# Patient Record
Sex: FEMALE | Race: WHITE | Employment: FULL TIME | URBAN - METROPOLITAN AREA
[De-identification: names, ages, dates, MRNs, and addresses within clinical notes are randomized per-mention and may not be internally consistent; named-entity substitution may affect disease eponyms.]

---

## 2017-01-15 PROBLEM — M79.606 PAIN OF LOWER EXTREMITY: Status: ACTIVE | Noted: 2017-01-15

## 2017-01-15 PROBLEM — R05.9 COUGH: Status: ACTIVE | Noted: 2017-01-15

## 2017-01-15 PROBLEM — J01.90 ACUTE NON-RECURRENT SINUSITIS: Status: ACTIVE | Noted: 2017-01-15

## 2017-01-15 PROBLEM — R53.83 FATIGUE: Status: ACTIVE | Noted: 2017-01-15

## 2017-01-15 PROBLEM — R09.81 NASAL CONGESTION: Status: ACTIVE | Noted: 2017-01-15

## 2018-03-11 ENCOUNTER — HOSPITAL ENCOUNTER (OUTPATIENT)
Dept: GENERAL RADIOLOGY | Age: 63
Discharge: HOME | End: 2018-03-13
Payer: COMMERCIAL

## 2018-03-11 ENCOUNTER — HOSPITAL ENCOUNTER (OUTPATIENT)
Age: 63
Discharge: HOME | End: 2018-03-13
Payer: COMMERCIAL

## 2018-03-11 DIAGNOSIS — M25.561 ACUTE PAIN OF RIGHT KNEE: ICD-10-CM

## 2018-03-11 PROCEDURE — 73560 X-RAY EXAM OF KNEE 1 OR 2: CPT

## 2018-03-15 ENCOUNTER — APPOINTMENT (OUTPATIENT)
Dept: GENERAL RADIOLOGY | Age: 63
End: 2018-03-15
Payer: COMMERCIAL

## 2018-03-15 ENCOUNTER — HOSPITAL ENCOUNTER (EMERGENCY)
Age: 63
Discharge: HOME | End: 2018-03-15
Payer: COMMERCIAL

## 2018-03-15 VITALS
TEMPERATURE: 98.6 F | OXYGEN SATURATION: 98 % | HEART RATE: 70 BPM | WEIGHT: 270 LBS | RESPIRATION RATE: 14 BRPM | HEIGHT: 68 IN | SYSTOLIC BLOOD PRESSURE: 120 MMHG | DIASTOLIC BLOOD PRESSURE: 78 MMHG | BODY MASS INDEX: 40.92 KG/M2

## 2018-03-15 DIAGNOSIS — S83.91XA SPRAIN OF RIGHT KNEE, UNSPECIFIED LIGAMENT, INITIAL ENCOUNTER: Primary | ICD-10-CM

## 2018-03-15 PROCEDURE — 73564 X-RAY EXAM KNEE 4 OR MORE: CPT

## 2018-03-15 PROCEDURE — 99283 EMERGENCY DEPT VISIT LOW MDM: CPT

## 2018-03-15 RX ORDER — IBUPROFEN 800 MG/1
800 TABLET ORAL EVERY 6 HOURS PRN
Qty: 16 TABLET | Refills: 0 | Status: SHIPPED | OUTPATIENT
Start: 2018-03-15 | End: 2018-09-21

## 2018-03-15 ASSESSMENT — PAIN SCALES - GENERAL
PAINLEVEL_OUTOF10: 5
PAINLEVEL_OUTOF10: 2

## 2018-03-15 ASSESSMENT — PAIN DESCRIPTION - PAIN TYPE
TYPE: ACUTE PAIN
TYPE: ACUTE PAIN

## 2018-03-15 ASSESSMENT — PAIN DESCRIPTION - ORIENTATION
ORIENTATION: RIGHT
ORIENTATION: RIGHT

## 2018-03-15 ASSESSMENT — PAIN DESCRIPTION - DESCRIPTORS
DESCRIPTORS: CONSTANT;ACHING
DESCRIPTORS: ACHING

## 2018-03-15 ASSESSMENT — PAIN DESCRIPTION - LOCATION
LOCATION: KNEE
LOCATION: KNEE

## 2018-03-21 DIAGNOSIS — I10 ESSENTIAL HYPERTENSION: ICD-10-CM

## 2018-03-21 DIAGNOSIS — I48.20 CHRONIC ATRIAL FIBRILLATION (HCC): ICD-10-CM

## 2018-03-21 RX ORDER — TORSEMIDE 10 MG/1
TABLET ORAL
Qty: 90 TABLET | Refills: 0 | Status: SHIPPED | OUTPATIENT
Start: 2018-03-21 | End: 2018-10-04 | Stop reason: SDUPTHER

## 2018-03-21 RX ORDER — WARFARIN SODIUM 2.5 MG/1
TABLET ORAL
Qty: 100 TABLET | Refills: 0 | Status: SHIPPED | OUTPATIENT
Start: 2018-03-21 | End: 2018-09-21

## 2018-05-31 ENCOUNTER — HOSPITAL ENCOUNTER (OUTPATIENT)
Age: 63
Discharge: HOME | End: 2018-05-31
Payer: COMMERCIAL

## 2018-05-31 LAB
INR BLD: 2.1
PROTHROMBIN TIME: 24.1 SEC (ref 9.3–12.4)

## 2018-05-31 PROCEDURE — 36415 COLL VENOUS BLD VENIPUNCTURE: CPT

## 2018-05-31 PROCEDURE — 85610 PROTHROMBIN TIME: CPT

## 2018-06-01 ENCOUNTER — ANTI-COAG VISIT (OUTPATIENT)
Dept: FAMILY MEDICINE CLINIC | Age: 63
End: 2018-06-01

## 2018-06-01 DIAGNOSIS — I48.20 CHRONIC ATRIAL FIBRILLATION (HCC): ICD-10-CM

## 2018-06-05 DIAGNOSIS — I48.20 CHRONIC ATRIAL FIBRILLATION (HCC): ICD-10-CM

## 2018-06-05 RX ORDER — WARFARIN SODIUM 2.5 MG/1
TABLET ORAL
Qty: 100 TABLET | Refills: 0 | Status: SHIPPED | OUTPATIENT
Start: 2018-06-05 | End: 2018-08-13 | Stop reason: SDUPTHER

## 2018-06-05 RX ORDER — WARFARIN SODIUM 2.5 MG/1
TABLET ORAL
Qty: 100 TABLET | Refills: 0 | Status: CANCELLED | OUTPATIENT
Start: 2018-06-05

## 2018-07-19 ENCOUNTER — TELEPHONE (OUTPATIENT)
Dept: FAMILY MEDICINE CLINIC | Age: 63
End: 2018-07-19

## 2018-07-19 NOTE — TELEPHONE ENCOUNTER
MA attempted to contact pt regarding OC Fit Test. Pt did not answer. MA left message for pt inquiring if she still had the FIT test to complete, and if she was still interested in returning it to the office for completion. MA advised pt that if she no longer had the FIT test and was interested in doing it, she may contact the office for another test kit. MA advised pt that if she did have test and wished to complete it, she may return it to the office at any time Monday-Friday 8:00AM-4:00PM or mail it to the office. MA requested return phone call for any questions or concerns.     Electronically signed by Samantha Parra MA on 7/19/18 at 10:23 AM

## 2018-08-12 ENCOUNTER — HOSPITAL ENCOUNTER (OUTPATIENT)
Age: 63
Discharge: HOME | End: 2018-08-12
Payer: COMMERCIAL

## 2018-08-12 LAB
INR BLD: 2.2
PROTHROMBIN TIME: 24.7 SEC (ref 9.3–12.4)

## 2018-08-12 PROCEDURE — 36415 COLL VENOUS BLD VENIPUNCTURE: CPT

## 2018-08-12 PROCEDURE — 85610 PROTHROMBIN TIME: CPT

## 2018-08-13 ENCOUNTER — ANTI-COAG VISIT (OUTPATIENT)
Dept: FAMILY MEDICINE CLINIC | Age: 63
End: 2018-08-13

## 2018-08-13 DIAGNOSIS — I48.20 CHRONIC ATRIAL FIBRILLATION (HCC): ICD-10-CM

## 2018-08-13 DIAGNOSIS — I10 ESSENTIAL HYPERTENSION: ICD-10-CM

## 2018-08-13 RX ORDER — WARFARIN SODIUM 2.5 MG/1
TABLET ORAL
Qty: 60 TABLET | Refills: 0 | Status: SHIPPED | OUTPATIENT
Start: 2018-08-13 | End: 2018-09-21 | Stop reason: SDUPTHER

## 2018-08-13 RX ORDER — SOTALOL HYDROCHLORIDE 120 MG/1
120 TABLET ORAL 2 TIMES DAILY
Qty: 60 TABLET | Refills: 0 | Status: SHIPPED | OUTPATIENT
Start: 2018-08-13 | End: 2018-09-10 | Stop reason: SDUPTHER

## 2018-09-21 ENCOUNTER — OFFICE VISIT (OUTPATIENT)
Dept: FAMILY MEDICINE CLINIC | Age: 63
End: 2018-09-21
Payer: COMMERCIAL

## 2018-09-21 ENCOUNTER — ANTI-COAG VISIT (OUTPATIENT)
Dept: FAMILY MEDICINE CLINIC | Age: 63
End: 2018-09-21

## 2018-09-21 VITALS
SYSTOLIC BLOOD PRESSURE: 134 MMHG | HEART RATE: 74 BPM | WEIGHT: 276 LBS | DIASTOLIC BLOOD PRESSURE: 82 MMHG | BODY MASS INDEX: 40.88 KG/M2 | OXYGEN SATURATION: 98 % | HEIGHT: 69 IN

## 2018-09-21 DIAGNOSIS — I48.20 CHRONIC ATRIAL FIBRILLATION (HCC): Primary | ICD-10-CM

## 2018-09-21 DIAGNOSIS — R73.01 IFG (IMPAIRED FASTING GLUCOSE): ICD-10-CM

## 2018-09-21 DIAGNOSIS — E78.2 MIXED HYPERLIPIDEMIA: ICD-10-CM

## 2018-09-21 DIAGNOSIS — I48.20 CHRONIC ATRIAL FIBRILLATION (HCC): ICD-10-CM

## 2018-09-21 DIAGNOSIS — Z12.39 SCREENING FOR BREAST CANCER: ICD-10-CM

## 2018-09-21 DIAGNOSIS — R53.83 OTHER FATIGUE: ICD-10-CM

## 2018-09-21 DIAGNOSIS — I10 ESSENTIAL HYPERTENSION: ICD-10-CM

## 2018-09-21 DIAGNOSIS — I83.93 VARICOSE VEINS OF BOTH LOWER EXTREMITIES: ICD-10-CM

## 2018-09-21 DIAGNOSIS — M19.90 OSTEOARTHRITIS, UNSPECIFIED OSTEOARTHRITIS TYPE, UNSPECIFIED SITE: ICD-10-CM

## 2018-09-21 LAB
INTERNATIONAL NORMALIZATION RATIO, POC: 1.9
PROTHROMBIN TIME, POC: NORMAL

## 2018-09-21 PROCEDURE — G8427 DOCREV CUR MEDS BY ELIG CLIN: HCPCS | Performed by: FAMILY MEDICINE

## 2018-09-21 PROCEDURE — G8417 CALC BMI ABV UP PARAM F/U: HCPCS | Performed by: FAMILY MEDICINE

## 2018-09-21 PROCEDURE — G8598 ASA/ANTIPLAT THER USED: HCPCS | Performed by: FAMILY MEDICINE

## 2018-09-21 PROCEDURE — 85610 PROTHROMBIN TIME: CPT | Performed by: FAMILY MEDICINE

## 2018-09-21 PROCEDURE — 3017F COLORECTAL CA SCREEN DOC REV: CPT | Performed by: FAMILY MEDICINE

## 2018-09-21 PROCEDURE — 1036F TOBACCO NON-USER: CPT | Performed by: FAMILY MEDICINE

## 2018-09-21 PROCEDURE — 99213 OFFICE O/P EST LOW 20 MIN: CPT | Performed by: FAMILY MEDICINE

## 2018-09-21 RX ORDER — CYANOCOBALAMIN 1000 UG/ML
1000 INJECTION INTRAMUSCULAR; SUBCUTANEOUS ONCE
Status: CANCELLED | OUTPATIENT
Start: 2018-09-21 | End: 2018-09-21

## 2018-09-21 RX ORDER — SOTALOL HYDROCHLORIDE 120 MG/1
TABLET ORAL
Qty: 60 TABLET | Refills: 5 | Status: SHIPPED | OUTPATIENT
Start: 2018-09-21 | End: 2019-04-12 | Stop reason: SDUPTHER

## 2018-09-21 RX ORDER — WARFARIN SODIUM 2.5 MG/1
TABLET ORAL
Qty: 60 TABLET | Refills: 5 | Status: SHIPPED | OUTPATIENT
Start: 2018-09-21 | End: 2019-07-02 | Stop reason: SDUPTHER

## 2018-09-21 RX ORDER — BACLOFEN 10 MG/1
10 TABLET ORAL EVERY EVENING
Qty: 30 TABLET | Refills: 1 | Status: SHIPPED | OUTPATIENT
Start: 2018-09-21 | End: 2019-08-03 | Stop reason: SDUPTHER

## 2018-09-21 NOTE — PROGRESS NOTES
MA spoke with pt regarding coumadin dose and recheck date. Pt verbalized she understood to continue on same dose, and requested for a standing order to go to the [facility]) lab to have venous INR's done. Order placed.     Electronically signed by Theo Barajas MA on 9/21/18 at 1:39 PM

## 2018-09-25 PROBLEM — O22.00 VARICOSE VEINS DURING PREGNANCY, ANTEPARTUM: Status: RESOLVED | Noted: 2018-09-25 | Resolved: 2018-09-25

## 2018-09-25 PROBLEM — O22.00 VARICOSE VEINS DURING PREGNANCY, ANTEPARTUM: Status: ACTIVE | Noted: 2018-09-25

## 2018-09-25 PROBLEM — I83.93 VARICOSE VEINS OF BOTH LOWER EXTREMITIES: Status: ACTIVE | Noted: 2018-09-25

## 2018-09-25 ASSESSMENT — ENCOUNTER SYMPTOMS
EYES NEGATIVE: 1
STRIDOR: 0
ABDOMINAL PAIN: 0
COUGH: 0
EYE REDNESS: 0
VOMITING: 0
SHORTNESS OF BREATH: 0
SPUTUM PRODUCTION: 0
SORE THROAT: 0
BACK PAIN: 0
GASTROINTESTINAL NEGATIVE: 1
NAUSEA: 0
EYE PAIN: 0
HEMOPTYSIS: 0
CONSTIPATION: 0
WHEEZING: 0
BLOOD IN STOOL: 0
BLURRED VISION: 0
PHOTOPHOBIA: 0
DOUBLE VISION: 0
EYE DISCHARGE: 0
DIARRHEA: 0
SINUS PAIN: 0
ORTHOPNEA: 0
HEARTBURN: 0
RESPIRATORY NEGATIVE: 1

## 2018-09-26 NOTE — PROGRESS NOTES
nervous/anxious and does not have insomnia. Past Medical/Surgical Hx;  Reviewed with patient      Diagnosis Date    Atrial fibrillation ([address]     Coronary heart disease 2013     Past Surgical History:   Procedure Laterality Date     SECTION      DILATION AND CURETTAGE OF UTERUS      ECHO COMPL W DOP COLOR FLOW  2013         TONSILLECTOMY         Past Family Hx:  Reviewed with patient  History reviewed. No pertinent family history. Social Hx:  Reviewed with patient  Social History   Substance Use Topics    Smoking status: Former Smoker    Smokeless tobacco: Never Used    Alcohol use No       OBJECTIVE  /82   Pulse 74   Ht 5' 8.5\" (1.74 m)   Wt 276 lb (125.2 kg)   SpO2 98%   Breastfeeding? No   BMI 41.36 kg/m²     Problem List:  Sherman Mcneill  does not have any pertinent problems on file. PHYS EX:  Physical Exam   Constitutional: She is oriented to person, place, and time. She appears well-developed and well-nourished. No distress. HENT:   Head: Normocephalic and atraumatic. Right Ear: External ear normal.   Left Ear: External ear normal.   Nose: Nose normal.   Mouth/Throat: Oropharynx is clear and moist. No oropharyngeal exudate. Eyes: Pupils are equal, round, and reactive to light. Conjunctivae and EOM are normal. Right eye exhibits no discharge. Left eye exhibits no discharge. No scleral icterus. Neck: Normal range of motion. Neck supple. No JVD present. No tracheal deviation present. No thyromegaly present. Cardiovascular: Normal rate, normal heart sounds and intact distal pulses. Exam reveals no gallop and no friction rub. No murmur heard. Pulmonary/Chest: Effort normal and breath sounds normal. No stridor. No respiratory distress. She has no wheezes. She has no rales. She exhibits no tenderness. Abdominal: Soft. Bowel sounds are normal. She exhibits no distension and no mass. There is no tenderness. There is no rebound and no guarding. No hernia. Musculoskeletal: Normal range of motion. She exhibits no edema, tenderness or deformity. Lymphadenopathy:     She has no cervical adenopathy. Neurological: She is alert and oriented to person, place, and time. She has normal reflexes. She displays normal reflexes. No cranial nerve deficit or sensory deficit. She exhibits normal muscle tone. Coordination normal.   Skin: Skin is warm. No rash noted. She is not diaphoretic. There is erythema. No pallor. Pt has varicose maggie. Lower extremities. Psychiatric: She has a normal mood and affect. Her behavior is normal. Judgment and thought content normal.   Nursing note and vitals reviewed. ASSESSMENT/PLAN  Aixa Hurtado was seen today for atrial fibrillation and anticoagulation. Diagnoses and all orders for this visit:    Chronic atrial fibrillation (HCC)  -     POCT INR  -     warfarin (COUMADIN) 2.5 MG tablet; Take one tablet daily or as directed  -     CBC Auto Differential; Future  -     Comprehensive Metabolic Panel; Future  Controlled. Lab, INR, coumadin, betapace. Essential hypertension  -     sotalol (BETAPACE) 120 MG tablet; TAKE 1 TABLET BY MOUTH TWICE DAILY  -     CBC Auto Differential; Future  -     Comprehensive Metabolic Panel; Future  Controlled. Lab, betapace, demadex. Other fatigue  -     CBC Auto Differential; Future  -     Comprehensive Metabolic Panel; Future  -     TSH without Reflex; Future  -     Vitamin B12 & Folate; Future  Not controlled. Lab. IFG (impaired fasting glucose)  -     CBC Auto Differential; Future  -     Comprehensive Metabolic Panel; Future  -     Hemoglobin A1C; Future  -     Lipid Panel; Future  Instructed on lab. Mixed hyperlipidemia  -     CBC Auto Differential; Future  -     Comprehensive Metabolic Panel; Future  -     Lipid Panel; Future  Not controlled. Lab, low chol. Diet. Screening for breast cancer  -     PURA DIGITAL SCREEN W OR WO CAD BILATERAL;  Future  -     CBC Auto Differential; Future  -     Comprehensive cancer screen     A1C test (Diabetic or Prediabetic)     Potassium monitoring     Creatinine monitoring     Flu vaccine (1)    Lipid screen     Colon cancer screen colonoscopy

## 2018-09-27 PROBLEM — R05.9 COUGH: Status: RESOLVED | Noted: 2017-01-15 | Resolved: 2018-09-27

## 2018-10-04 DIAGNOSIS — I10 ESSENTIAL HYPERTENSION: ICD-10-CM

## 2018-10-04 RX ORDER — TORSEMIDE 10 MG/1
TABLET ORAL
Qty: 90 TABLET | Refills: 0 | Status: SHIPPED | OUTPATIENT
Start: 2018-10-04 | End: 2019-04-13 | Stop reason: SDUPTHER

## 2018-10-04 NOTE — TELEPHONE ENCOUNTER
From: Brett Sotelo  Sent: 10/4/2018 11:54 AM EDT  Subject: Medication Renewal Request    Clayton Taylor.  Sakacs would like a refill of the following medications:     torsemide (DEMADEX) 10 MG tablet NATALIE Mccoy    Preferred pharmacy: [facility] [address], [address] - F [phone]

## 2018-10-24 ENCOUNTER — TELEPHONE (OUTPATIENT)
Dept: VASCULAR SURGERY | Age: 63
End: 2018-10-24

## 2018-10-25 ENCOUNTER — HOSPITAL ENCOUNTER (OUTPATIENT)
Dept: MAMMOGRAPHY | Age: 63
Discharge: HOME | End: 2018-10-27
Payer: COMMERCIAL

## 2018-10-25 DIAGNOSIS — Z12.39 SCREENING FOR BREAST CANCER: ICD-10-CM

## 2018-12-17 DIAGNOSIS — I48.20 CHRONIC ATRIAL FIBRILLATION (HCC): Primary | ICD-10-CM

## 2019-02-10 ENCOUNTER — HOSPITAL ENCOUNTER (OUTPATIENT)
Age: 64
Discharge: HOME | End: 2019-02-10
Payer: COMMERCIAL

## 2019-02-10 DIAGNOSIS — R53.83 OTHER FATIGUE: ICD-10-CM

## 2019-02-10 DIAGNOSIS — R73.01 IFG (IMPAIRED FASTING GLUCOSE): ICD-10-CM

## 2019-02-10 DIAGNOSIS — M19.90 OSTEOARTHRITIS, UNSPECIFIED OSTEOARTHRITIS TYPE, UNSPECIFIED SITE: ICD-10-CM

## 2019-02-10 DIAGNOSIS — I10 ESSENTIAL HYPERTENSION: ICD-10-CM

## 2019-02-10 DIAGNOSIS — I48.20 CHRONIC ATRIAL FIBRILLATION (HCC): ICD-10-CM

## 2019-02-10 DIAGNOSIS — E78.2 MIXED HYPERLIPIDEMIA: ICD-10-CM

## 2019-02-10 DIAGNOSIS — Z12.39 SCREENING FOR BREAST CANCER: ICD-10-CM

## 2019-02-10 LAB
ALBUMIN SERPL-MCNC: 4 G/DL (ref 3.5–5.2)
ALP BLD-CCNC: 60 U/L (ref 35–104)
ALT SERPL-CCNC: 15 U/L (ref 0–32)
ANION GAP SERPL CALCULATED.3IONS-SCNC: 10 MMOL/L (ref 7–16)
AST SERPL-CCNC: 15 U/L (ref 0–31)
BASOPHILS ABSOLUTE: 0.06 E9/L (ref 0–0.2)
BASOPHILS RELATIVE PERCENT: 1 % (ref 0–2)
BILIRUB SERPL-MCNC: 0.4 MG/DL (ref 0–1.2)
BUN BLDV-MCNC: 13 MG/DL (ref 8–23)
CALCIUM SERPL-MCNC: 9.6 MG/DL (ref 8.6–10.2)
CHLORIDE BLD-SCNC: 107 MMOL/L (ref 98–107)
CHOLESTEROL, TOTAL: 164 MG/DL (ref 0–199)
CO2: 27 MMOL/L (ref 22–29)
CREAT SERPL-MCNC: 0.8 MG/DL (ref 0.5–1)
EOSINOPHILS ABSOLUTE: 0.23 E9/L (ref 0.05–0.5)
EOSINOPHILS RELATIVE PERCENT: 3.7 % (ref 0–6)
FOLATE: 7.4 NG/ML (ref 4.8–24.2)
GFR AFRICAN AMERICAN: >60
GFR NON-AFRICAN AMERICAN: >60 ML/MIN/1.73
GLUCOSE BLD-MCNC: 119 MG/DL (ref 74–99)
HBA1C MFR BLD: 5.6 % (ref 4–5.6)
HCT VFR BLD CALC: 41.1 % (ref 34–48)
HDLC SERPL-MCNC: 38 MG/DL
HEMOGLOBIN: 12.8 G/DL (ref 11.5–15.5)
IMMATURE GRANULOCYTES #: 0.02 E9/L
IMMATURE GRANULOCYTES %: 0.3 % (ref 0–5)
INR BLD: 2.2
LDL CHOLESTEROL CALCULATED: 101 MG/DL (ref 0–99)
LYMPHOCYTES ABSOLUTE: 1.55 E9/L (ref 1.5–4)
LYMPHOCYTES RELATIVE PERCENT: 24.7 % (ref 20–42)
MCH RBC QN AUTO: 27.8 PG (ref 26–35)
MCHC RBC AUTO-ENTMCNC: 31.1 % (ref 32–34.5)
MCV RBC AUTO: 89.3 FL (ref 80–99.9)
MONOCYTES ABSOLUTE: 0.41 E9/L (ref 0.1–0.95)
MONOCYTES RELATIVE PERCENT: 6.5 % (ref 2–12)
NEUTROPHILS ABSOLUTE: 4 E9/L (ref 1.8–7.3)
NEUTROPHILS RELATIVE PERCENT: 63.8 % (ref 43–80)
PDW BLD-RTO: 13.4 FL (ref 11.5–15)
PLATELET # BLD: 246 E9/L (ref 130–450)
PMV BLD AUTO: 10.7 FL (ref 7–12)
POTASSIUM SERPL-SCNC: 4.1 MMOL/L (ref 3.5–5)
PROTHROMBIN TIME: 24.6 SEC (ref 9.3–12.4)
RBC # BLD: 4.6 E12/L (ref 3.5–5.5)
SODIUM BLD-SCNC: 144 MMOL/L (ref 132–146)
TOTAL PROTEIN: 7.4 G/DL (ref 6.4–8.3)
TRIGL SERPL-MCNC: 125 MG/DL (ref 0–149)
TSH SERPL DL<=0.05 MIU/L-ACNC: 2.94 UIU/ML (ref 0.27–4.2)
VITAMIN B-12: 290 PG/ML (ref 211–946)
VLDLC SERPL CALC-MCNC: 25 MG/DL
WBC # BLD: 6.3 E9/L (ref 4.5–11.5)

## 2019-02-10 PROCEDURE — 84443 ASSAY THYROID STIM HORMONE: CPT

## 2019-02-10 PROCEDURE — 85610 PROTHROMBIN TIME: CPT

## 2019-02-10 PROCEDURE — 80061 LIPID PANEL: CPT

## 2019-02-10 PROCEDURE — 80053 COMPREHEN METABOLIC PANEL: CPT

## 2019-02-10 PROCEDURE — 82607 VITAMIN B-12: CPT

## 2019-02-10 PROCEDURE — 82746 ASSAY OF FOLIC ACID SERUM: CPT

## 2019-02-10 PROCEDURE — 83036 HEMOGLOBIN GLYCOSYLATED A1C: CPT

## 2019-02-10 PROCEDURE — 85025 COMPLETE CBC W/AUTO DIFF WBC: CPT

## 2019-02-10 PROCEDURE — 36415 COLL VENOUS BLD VENIPUNCTURE: CPT

## 2019-02-11 ENCOUNTER — ANTI-COAG VISIT (OUTPATIENT)
Dept: FAMILY MEDICINE CLINIC | Age: 64
End: 2019-02-11

## 2019-02-11 DIAGNOSIS — I48.20 CHRONIC ATRIAL FIBRILLATION (HCC): ICD-10-CM

## 2019-02-22 ENCOUNTER — OFFICE VISIT (OUTPATIENT)
Dept: FAMILY MEDICINE CLINIC | Age: 64
End: 2019-02-22
Payer: COMMERCIAL

## 2019-02-22 VITALS
DIASTOLIC BLOOD PRESSURE: 78 MMHG | WEIGHT: 272 LBS | TEMPERATURE: 97.3 F | HEART RATE: 95 BPM | BODY MASS INDEX: 40.29 KG/M2 | OXYGEN SATURATION: 98 % | RESPIRATION RATE: 18 BRPM | SYSTOLIC BLOOD PRESSURE: 120 MMHG | HEIGHT: 69 IN

## 2019-02-22 DIAGNOSIS — K02.9 INFECTED DENTAL CARRIES: ICD-10-CM

## 2019-02-22 DIAGNOSIS — K04.7 INFECTED DENTAL CARRIES: ICD-10-CM

## 2019-02-22 DIAGNOSIS — I83.93 VARICOSE VEINS OF BOTH LOWER EXTREMITIES, UNSPECIFIED WHETHER COMPLICATED: ICD-10-CM

## 2019-02-22 DIAGNOSIS — I10 ESSENTIAL HYPERTENSION: ICD-10-CM

## 2019-02-22 DIAGNOSIS — Z01.818 PRE-OP EXAM: ICD-10-CM

## 2019-02-22 DIAGNOSIS — Z12.39 SCREENING FOR BREAST CANCER: ICD-10-CM

## 2019-02-22 DIAGNOSIS — I48.20 CHRONIC ATRIAL FIBRILLATION (HCC): Primary | ICD-10-CM

## 2019-02-22 PROCEDURE — G8484 FLU IMMUNIZE NO ADMIN: HCPCS | Performed by: FAMILY MEDICINE

## 2019-02-22 PROCEDURE — 93000 ELECTROCARDIOGRAM COMPLETE: CPT | Performed by: FAMILY MEDICINE

## 2019-02-22 PROCEDURE — 3017F COLORECTAL CA SCREEN DOC REV: CPT | Performed by: FAMILY MEDICINE

## 2019-02-22 PROCEDURE — 1036F TOBACCO NON-USER: CPT | Performed by: FAMILY MEDICINE

## 2019-02-22 PROCEDURE — 99214 OFFICE O/P EST MOD 30 MIN: CPT | Performed by: FAMILY MEDICINE

## 2019-02-22 PROCEDURE — G8598 ASA/ANTIPLAT THER USED: HCPCS | Performed by: FAMILY MEDICINE

## 2019-02-22 PROCEDURE — G8417 CALC BMI ABV UP PARAM F/U: HCPCS | Performed by: FAMILY MEDICINE

## 2019-02-22 PROCEDURE — G8427 DOCREV CUR MEDS BY ELIG CLIN: HCPCS | Performed by: FAMILY MEDICINE

## 2019-02-22 ASSESSMENT — PATIENT HEALTH QUESTIONNAIRE - PHQ9
SUM OF ALL RESPONSES TO PHQ QUESTIONS 1-9: 0
2. FEELING DOWN, DEPRESSED OR HOPELESS: 0
SUM OF ALL RESPONSES TO PHQ QUESTIONS 1-9: 0
SUM OF ALL RESPONSES TO PHQ9 QUESTIONS 1 & 2: 0
1. LITTLE INTEREST OR PLEASURE IN DOING THINGS: 0

## 2019-02-25 ASSESSMENT — ENCOUNTER SYMPTOMS
CHOKING: 0
COUGH: 0
VOICE CHANGE: 0
EYE PAIN: 0
STRIDOR: 0
EYE REDNESS: 0
BACK PAIN: 0
RECTAL PAIN: 0
BLOOD IN STOOL: 0
VOMITING: 0
SORE THROAT: 0
FACIAL SWELLING: 0
WHEEZING: 0
ANAL BLEEDING: 0
SINUS PRESSURE: 0
DIARRHEA: 0
EYE ITCHING: 0
ABDOMINAL DISTENTION: 0
TROUBLE SWALLOWING: 0
CHEST TIGHTNESS: 0
NAUSEA: 0
SINUS PAIN: 0
RESPIRATORY NEGATIVE: 1
RHINORRHEA: 0
COLOR CHANGE: 0
EYE DISCHARGE: 0
PHOTOPHOBIA: 0
ALLERGIC/IMMUNOLOGIC NEGATIVE: 1
SHORTNESS OF BREATH: 0
EYES NEGATIVE: 1
CONSTIPATION: 0
ABDOMINAL PAIN: 0

## 2019-04-03 ENCOUNTER — ANTI-COAG VISIT (OUTPATIENT)
Dept: FAMILY MEDICINE CLINIC | Age: 64
End: 2019-04-03

## 2019-04-03 ENCOUNTER — TELEPHONE (OUTPATIENT)
Dept: FAMILY MEDICINE CLINIC | Age: 64
End: 2019-04-03

## 2019-04-03 ENCOUNTER — OFFICE VISIT (OUTPATIENT)
Dept: FAMILY MEDICINE CLINIC | Age: 64
End: 2019-04-03
Payer: COMMERCIAL

## 2019-04-03 VITALS
DIASTOLIC BLOOD PRESSURE: 80 MMHG | WEIGHT: 274 LBS | HEART RATE: 90 BPM | SYSTOLIC BLOOD PRESSURE: 100 MMHG | OXYGEN SATURATION: 94 % | BODY MASS INDEX: 40.58 KG/M2 | HEIGHT: 69 IN

## 2019-04-03 DIAGNOSIS — I48.20 CHRONIC ATRIAL FIBRILLATION (HCC): ICD-10-CM

## 2019-04-03 DIAGNOSIS — E78.2 MIXED HYPERLIPIDEMIA: ICD-10-CM

## 2019-04-03 DIAGNOSIS — I48.20 CHRONIC ATRIAL FIBRILLATION (HCC): Primary | ICD-10-CM

## 2019-04-03 DIAGNOSIS — R73.01 IFG (IMPAIRED FASTING GLUCOSE): ICD-10-CM

## 2019-04-03 DIAGNOSIS — M67.911 ROTATOR CUFF DISORDER, RIGHT: ICD-10-CM

## 2019-04-03 LAB
INTERNATIONAL NORMALIZATION RATIO, POC: 1.6
PROTHROMBIN TIME, POC: 18.8

## 2019-04-03 PROCEDURE — G8417 CALC BMI ABV UP PARAM F/U: HCPCS | Performed by: FAMILY MEDICINE

## 2019-04-03 PROCEDURE — 3017F COLORECTAL CA SCREEN DOC REV: CPT | Performed by: FAMILY MEDICINE

## 2019-04-03 PROCEDURE — 85610 PROTHROMBIN TIME: CPT | Performed by: FAMILY MEDICINE

## 2019-04-03 PROCEDURE — 99213 OFFICE O/P EST LOW 20 MIN: CPT | Performed by: FAMILY MEDICINE

## 2019-04-03 PROCEDURE — G8427 DOCREV CUR MEDS BY ELIG CLIN: HCPCS | Performed by: FAMILY MEDICINE

## 2019-04-03 PROCEDURE — G8598 ASA/ANTIPLAT THER USED: HCPCS | Performed by: FAMILY MEDICINE

## 2019-04-03 PROCEDURE — 1036F TOBACCO NON-USER: CPT | Performed by: FAMILY MEDICINE

## 2019-04-03 ASSESSMENT — ENCOUNTER SYMPTOMS
ALLERGIC/IMMUNOLOGIC NEGATIVE: 1
RESPIRATORY NEGATIVE: 1
NAUSEA: 0
EYE PAIN: 0
EYE REDNESS: 0
CHEST TIGHTNESS: 0
SINUS PAIN: 0
CONSTIPATION: 0
VOMITING: 0
ANAL BLEEDING: 0
EYE ITCHING: 0
CHOKING: 0
COUGH: 0
ABDOMINAL PAIN: 0
ORTHOPNEA: 0
EYES NEGATIVE: 1
WHEEZING: 0
VOICE CHANGE: 0
FACIAL SWELLING: 0
RHINORRHEA: 0
BLURRED VISION: 0
TROUBLE SWALLOWING: 0
SORE THROAT: 0
ABDOMINAL DISTENTION: 0
COLOR CHANGE: 0
DIARRHEA: 0
SHORTNESS OF BREATH: 0
BLOOD IN STOOL: 0
STRIDOR: 0
BACK PAIN: 0
EYE DISCHARGE: 0
SINUS PRESSURE: 0
RECTAL PAIN: 0
PHOTOPHOBIA: 0

## 2019-04-03 NOTE — PROGRESS NOTES
HENT: Positive for dental problem. Negative for congestion, drooling, ear discharge, ear pain, facial swelling, hearing loss, mouth sores, nosebleeds, postnasal drip, rhinorrhea, sinus pressure, sinus pain, sneezing, sore throat, tinnitus, trouble swallowing and voice change. Eyes: Negative. Negative for blurred vision, photophobia, pain, discharge, redness, itching and visual disturbance. Respiratory: Negative. Negative for cough, choking, chest tightness, shortness of breath, wheezing and stridor. Cardiovascular: Negative for chest pain, palpitations, orthopnea, leg swelling and PND. Gastrointestinal: Negative for abdominal distention, abdominal pain, anal bleeding, blood in stool, constipation, diarrhea, nausea, rectal pain and vomiting. Endocrine: Negative. Negative for cold intolerance, heat intolerance, polydipsia, polyphagia and polyuria. Genitourinary: Negative. Negative for decreased urine volume, difficulty urinating, dysuria, flank pain, frequency, genital sores, hematuria, menstrual problem, pelvic pain and urgency. Musculoskeletal: Positive for arthralgias (right shoulder pain). Negative for back pain, gait problem, joint swelling, myalgias, neck pain and neck stiffness. Skin: Negative. Negative for color change, pallor, rash and wound. Allergic/Immunologic: Negative. Neurological: Negative. Negative for dizziness, tingling, tremors, seizures, syncope, facial asymmetry, speech difficulty, weakness, light-headedness, numbness and headaches. Hematological: Negative for adenopathy. Bruises/bleeds easily. Psychiatric/Behavioral: Negative. Negative for agitation, behavioral problems, confusion, decreased concentration, dysphoric mood, hallucinations, self-injury, sleep disturbance and suicidal ideas. The patient is not nervous/anxious and is not hyperactive.          Past Medical/Surgical Hx;  Reviewed with patient      Diagnosis Date    Atrial fibrillation ([facility] [address]     Coronary heart disease 2013     Past Surgical History:   Procedure Laterality Date     SECTION      DILATION AND CURETTAGE OF UTERUS      ECHO COMPL W DOP COLOR FLOW  2013         TONSILLECTOMY         Past Family Hx:  Reviewed with patient  History reviewed. No pertinent family history. Social Hx:  Reviewed with patient  Social History     Tobacco Use    Smoking status: Former Smoker     Packs/day: 2.00     Years: 15.00     Pack years: 30.00     Types: Cigarettes     Start date: 1972     Last attempt to quit: 1987     Years since quittin.2    Smokeless tobacco: Never Used   Substance Use Topics    Alcohol use: No       OBJECTIVE  /80   Pulse 90   Ht 5' 9\" (1.753 m)   Wt 274 lb (124.3 kg)   SpO2 94%   BMI 40.46 kg/m²     Problem List:  Silvio Fairchild does not have any pertinent problems on file. PHYS EX:  Physical Exam   Constitutional: She is oriented to person, place, and time. She appears well-developed and well-nourished. No distress. HENT:   Head: Normocephalic and atraumatic. Right Ear: External ear normal.   Left Ear: External ear normal.   Nose: Nose normal.   Mouth/Throat: Oropharynx is clear and moist. No oropharyngeal exudate. Pt has dental carries. Eyes: Pupils are equal, round, and reactive to light. Conjunctivae and EOM are normal. Right eye exhibits no discharge. Left eye exhibits no discharge. No scleral icterus. Neck: Normal range of motion. Neck supple. No JVD present. No tracheal deviation present. No thyromegaly present. Cardiovascular: Normal rate, normal heart sounds and intact distal pulses. An irregularly irregular rhythm present. No extrasystoles are present. PMI is not displaced. Exam reveals no gallop, no distant heart sounds and no friction rub. No murmur heard. No systolic murmur is present. No diastolic murmur is present. Pt has chronic atrial fib. ([address] Pt has varicose veins maggie. Lower extremities.    Pulmonary/Chest: Effort normal and breath sounds normal. No stridor. No respiratory distress. She has no wheezes. She has no rales. She exhibits no tenderness. Abdominal: Soft. Bowel sounds are normal. She exhibits no distension and no mass. There is no tenderness. There is no rebound and no guarding. No hernia. Musculoskeletal: She exhibits tenderness (painful right shoulder, deltoid with limit to ROM). She exhibits no edema or deformity. Right shoulder: She exhibits decreased range of motion, tenderness, pain, spasm and decreased strength. She exhibits no bony tenderness, no swelling, no effusion, no crepitus, no deformity, no laceration and normal pulse. Arms:  Lymphadenopathy:     She has no cervical adenopathy. Neurological: She is alert and oriented to person, place, and time. She has normal reflexes. She displays normal reflexes. No cranial nerve deficit or sensory deficit. She exhibits normal muscle tone. Coordination normal.   Skin: Skin is warm. No rash noted. She is not diaphoretic. No erythema. No pallor. Nursing note and vitals reviewed. ASSESSMENT/PLAN  Alonso Sanchez was seen today for arm pain. Diagnoses and all orders for this visit:    Chronic atrial fibrillation (HCC)  -     POCT INR  ---CARDIAC---COUMADIN, ace, BETA, statin, DEMADEX, ( ccb )    Rotator cuff disorder, right  -     XR SHOULDER RIGHT (MIN 2 VIEWS); Future  -     diclofenac sodium 1 % GEL;  Apply 2 g topically 4 times daily  -- OMT / ultra--Rx    IFG (impaired fasting glucose)  Long talk on treatment and prevention  Literature is given   ---VASCULAR PANEL  A) asa, plavix, aggrenox  B) COUMADIN, pletal, tzd, statin  C) ace, DEMADEX, folic, ccb  D) cannikinumab, fish oils         Mixed hyperlipidemia  --Mediterranean diet, exercise, weight loss, vitamins    We have a long talk on cholesterol and importance of lowering it           Outpatient Encounter Medications as of 4/3/2019   Medication Sig Dispense Refill    diclofenac sodium

## 2019-04-03 NOTE — TELEPHONE ENCOUNTER
Pharmacy left message on Clinical Care line asking if it is ok to fill the diclofenac gel due to the drug interaction with coumadin. Please advise.     Electronically signed by Gus Arredondo on 4/3/19 at 3:58 PM

## 2019-04-03 NOTE — PATIENT INSTRUCTIONS
Patient Education        Atrial Fibrillation: Care Instructions  Your Care Instructions    Atrial fibrillation is an irregular and often fast heartbeat. Treating this condition is important for several reasons. It can cause blood clots, which can travel from your heart to your brain and cause a stroke. If you have a fast heartbeat, you may feel lightheaded, dizzy, and weak. An irregular heartbeat can also increase your risk for heart failure. Atrial fibrillation is often the result of another heart condition, such as high blood pressure or coronary artery disease. Making changes to improve your heart condition will help you stay healthy and active. Follow-up care is a key part of your treatment and safety. Be sure to make and go to all appointments, and call your doctor if you are having problems. It's also a good idea to know your test results and keep a list of the medicines you take. How can you care for yourself at home? Medicines    · Take your medicines exactly as prescribed. Call your doctor if you think you are having a problem with your medicine. You will get more details on the specific medicines your doctor prescribes.     · If your doctor has given you a blood thinner to prevent a stroke, be sure you get instructions about how to take your medicine safely. Blood thinners can cause serious bleeding problems.     · Do not take any vitamins, over-the-counter drugs, or herbal products without talking to your doctor first.    Lifestyle changes    · Do not smoke. Smoking can increase your chance of a stroke and heart attack. If you need help quitting, talk to your doctor about stop-smoking programs and medicines. These can increase your chances of quitting for good.     · Eat a heart-healthy diet.     · Stay at a healthy weight. Lose weight if you need to.     · Limit alcohol to 2 drinks a day for men and 1 drink a day for women. Too much alcohol can cause health problems.     · Avoid colds and flu.  Get a pneumococcal vaccine shot. If you have had one before, ask your doctor whether you need another dose. Get a flu shot every year. If you must be around people with colds or flu, wash your hands often. Activity    · If your doctor recommends it, get more exercise. Walking is a good choice. Bit by bit, increase the amount you walk every day. Try for at least 30 minutes on most days of the week. You also may want to swim, bike, or do other activities. Your doctor may suggest that you join a cardiac rehabilitation program so that you can have help increasing your physical activity safely.     · Start light exercise if your doctor says it is okay. Even a small amount will help you get stronger, have more energy, and manage stress. Walking is an easy way to get exercise. Start out by walking a little more than you did in the hospital. Gradually increase the amount you walk.     · When you exercise, watch for signs that your heart is working too hard. You are pushing too hard if you cannot talk while you are exercising. If you become short of breath or dizzy or have chest pain, sit down and rest immediately.     · Check your pulse regularly. Place two fingers on the artery at the palm side of your wrist, in line with your thumb. If your heartbeat seems uneven or fast, talk to your doctor. When should you call for help? Call 911 anytime you think you may need emergency care. For example, call if:    · You have symptoms of a heart attack. These may include:  ? Chest pain or pressure, or a strange feeling in the chest.  ? Sweating. ? Shortness of breath. ? Nausea or vomiting. ? Pain, pressure, or a strange feeling in the back, neck, jaw, or upper belly or in one or both shoulders or arms. ? Lightheadedness or sudden weakness. ? A fast or irregular heartbeat. After you call 911, the  may tell you to chew 1 adult-strength or 2 to 4 low-dose aspirin. Wait for an ambulance.  Do not try to drive yourself.     · You have symptoms of a stroke. These may include:  ? Sudden numbness, tingling, weakness, or loss of movement in your face, arm, or leg, especially on only one side of your body. ? Sudden vision changes. ? Sudden trouble speaking. ? Sudden confusion or trouble understanding simple statements. ? Sudden problems with walking or balance. ? A sudden, severe headache that is different from past headaches.     · You passed out (lost consciousness).    Call your doctor now or seek immediate medical care if:    · You have new or increased shortness of breath.     · You feel dizzy or lightheaded, or you feel like you may faint.     · Your heart rate becomes irregular.     · You can feel your heart flutter in your chest or skip heartbeats. Tell your doctor if these symptoms are new or worse.    Watch closely for changes in your health, and be sure to contact your doctor if you have any problems. Where can you learn more? Go to https://Vaddio.Stantum. org and sign in to your iLost account. Enter U020 in the Ext[employer] box to learn more about \"Atrial Fibrillation: Care Instructions. \"     If you do not have an account, please click on the \"Sign Up Now\" link. Current as of: July 22, 2018  Content Version: 11.9  © 2075-5942 [employer]. Care instructions adapted under license by [facility] ([facility]). If you have questions about a medical condition or this instruction, always ask your healthcare professional. [place] [address] any warranty or liability for your use of this information. Patient Education        Rotator Cuff: Exercises  Your Care Instructions  Here are some examples of typical rehabilitation exercises for your condition. Start each exercise slowly. Ease off the exercise if you start to have pain. Your doctor or physical therapist will tell you when you can start these exercises and which ones will work best for you.   How to do the exercises  Pendulum swing    1. Hold on to a table or the back of a chair with your good arm. Then bend forward a little and let your sore arm hang straight down. This exercise does not use the arm muscles. Rather, use your legs and your hips to create movement that makes your arm swing freely. 2. Use the movement from your hips and legs to guide the slightly swinging arm back and forth like a pendulum (or elephant trunk). Then guide it in circles that start small (about the size of a dinner plate). Make the circles a bit larger each day, as your pain allows. 3. Do this exercise for 5 minutes, 5 to 7 times each day. 4. As you have less pain, try bending over a little farther to do this exercise. This will increase the amount of movement at your shoulder. Posterior stretching exercise    1. Hold the elbow of your injured arm with your other hand. 2. Use your hand to pull your injured arm gently up and across your body. You will feel a gentle stretch across the back of your injured shoulder. 3. Hold for at least 15 to 30 seconds. Then slowly lower your arm. 4. Repeat 2 to 4 times. Up-the-back stretch    1. Put your hand in your back pocket. Let it rest there to stretch your shoulder. 2. With your other hand, hold your injured arm (palm outward) behind your back by the wrist. Pull your arm up gently to stretch your shoulder. 3. Next, put a towel over your other shoulder. Put the hand of your injured arm behind your back. Now hold the back end of the towel. With the other hand, hold the front end of the towel in front of your body. Pull gently on the front end of the towel. This will bring your hand farther up your back to stretch your shoulder. Overhead stretch    1. Standing about an arm's length away, grasp onto a solid surface. You could use a countertop, a doorknob, or the back of a sturdy chair. 2. With your knees slightly bent, bend forward with your arms straight.  Lower your upper body, and let your shoulders stretch. 3. As your shoulders are able to stretch farther, you may need to take a step or two backward. 4. Hold for at least 15 to 30 seconds. Then stand up and relax. If you had stepped back during your stretch, step forward so you can keep your hands on the solid surface. 5. Repeat 2 to 4 times. Shoulder flexion (lying down)    1. Lie on your back, holding a wand with both hands. Your palms should face down as you hold the wand. 2. Keeping your elbows straight, slowly raise your arms over your head. Raise them until you feel a stretch in your shoulders, upper back, and chest.  3. Hold for 15 to 30 seconds. 4. Repeat 2 to 4 times. Shoulder rotation (lying down)    1. Lie on your back. Hold a wand with both hands with your elbows bent and palms up. 2. Keep your elbows close to your body, and move the wand across your body toward the sore arm. 3. Hold for 8 to 12 seconds. 4. Repeat 2 to 4 times. Wall climbing (to the side)    1. Stand with your side to a wall so that your fingers can just touch it at an angle about 30 degrees toward the front of your body. 2. Walk the fingers of your injured arm up the wall as high as pain permits. Try not to shrug your shoulder up toward your ear as you move your arm up. 3. Hold that position for a count of at least 15 to 20.  4. Walk your fingers back down to the starting position. 5. Repeat at least 2 to 4 times. Try to reach higher each time. Wall climbing (to the front)    1. Face a wall, and stand so your fingers can just touch it. 2. Keeping your shoulder down, walk the fingers of your injured arm up the wall as high as pain permits. (Don't shrug your shoulder up toward your ear.)  3. Hold your arm in that position for at least 15 to 30 seconds. 4. Slowly walk your fingers back down to where you started. 5. Repeat at least 2 to 4 times. Try to reach higher each time. Shoulder blade squeeze    1.  Stand with your arms at your sides, and squeeze your shoulder blades together. Do not raise your shoulders up as you squeeze. 2. Hold 6 seconds. 3. Repeat 8 to 12 times. Scapular exercise: Arm reach    1. Lie flat on your back. This exercise is a very slight motion that starts with your arms raised (elbows straight, arms straight). 2. From this position, reach higher toward the emmanuel or ceiling. Keep your elbows straight. All motion should be from your shoulder blade only. 3. Relax your arms back to where you started. 4. Repeat 8 to 12 times. Arm raise to the side    1. Slowly raise your injured arm to the side, with your thumb facing up. Raise your arm 60 degrees at the most (shoulder level is 90 degrees). 2. Hold the position for 3 to 5 seconds. Then lower your arm back to your side. If you need to, bring your \"good\" arm across your body and place it under the elbow as you lower your injured arm. Use your good arm to keep your injured arm from dropping down too fast.  3. Repeat 8 to 12 times. 4. When you first start out, don't hold any extra weight in your hand. As you get stronger, you may use a 1-pound to 2-pound dumbbell or a small can of food. Shoulder flexor and extensor exercise    1. Push forward (flex): Stand facing a wall or doorjamb, about 6 inches or less back. Hold your injured arm against your body. Make a closed fist with your thumb on top. Then gently push your hand forward into the wall with about 25% to 50% of your strength. Don't let your body move backward as you push. Hold for about 6 seconds. Relax for a few seconds. Repeat 8 to 12 times. 2. Push backward (extend): Stand with your back flat against a wall. Your upper arm should be against the wall, with your elbow bent 90 degrees (your hand straight ahead). Push your elbow gently back against the wall with about 25% to 50% of your strength. Don't let your body move forward as you push. Hold for about 6 seconds. Relax for a few seconds.  Repeat 8 to 12 times.    Scapular exercise: Wall push-ups    1. Stand facing a wall, about 12 inches to 18 inches away. 2. Place your hands on the wall at shoulder height. 3. Slowly bend your elbows and bring your face to the wall. Keep your back and hips straight. 4. Push back to where you started. 5. Repeat 8 to 12 times. 6. When you can do this exercise against a wall comfortably, you can try it against a counter. You can then slowly progress to the end of a couch, then to a sturdy chair, and finally to the floor. Scapular exercise: Retraction    1. Put the band around a solid object at about waist level. (A bedpost will work well.) Each hand should hold an end of the band. 2. With your elbows at your sides and bent to 90 degrees, pull the band back. Your shoulder blades should move toward each other. Then move your arms back where you started. 3. Repeat 8 to 12 times. 4. If you have good range of motion in your shoulders, try this exercise with your arms lifted out to the sides. Keep your elbows at a 90-degree angle. Raise the elastic band up to about shoulder level. Pull the band back to move your shoulder blades toward each other. Then move your arms back where you started. Internal rotator strengthening exercise    1. Start by tying a piece of elastic exercise material to a doorknob. You can use surgical tubing or Thera-Band. 2. Stand or sit with your shoulder relaxed and your elbow bent 90 degrees. Your upper arm should rest comfortably against your side. Squeeze a rolled towel between your elbow and your body for comfort. This will help keep your arm at your side. 3. Hold one end of the elastic band in the hand of the painful arm. 4. Slowly rotate your forearm toward your body until it touches your belly. Slowly move it back to where you started. 5. Keep your elbow and upper arm firmly tucked against the towel roll or at your side. 6. Repeat 8 to 12 times.     External rotator strengthening exercises until your doctor says you can. Usually, this is at least 6 to 8 weeks after surgery. Your doctor will tell you how often and how long to do the exercises. Arm raises to the side  · Stand upright with your feet shoulder-width apart and your affected arm at your side. · Slowly raise your injured arm to the side, with your thumb facing up. Raise your arm 60 degrees at the most (shoulder level is 90 degrees). · After holding the position for 3 to 5 seconds, lower your arm back to your side. If you need to, bring your \"good\" arm across your body and place it under the elbow as you lower your injured arm. Use your good arm to keep your injured arm from dropping down too fast during the downward motion. · Repeat 8 to 12 times. · When you first start out, don't hold any additional weight in your hand. As your strength improves, you may use a 1- to 2-pound dumbbell or a small can of food. Shoulder flexor  · Stand facing a wall. Your body should be about 6 inches away from the wall. · Keep your affected arm and elbow to your side, and bend your elbow so that your arm is pointing toward the wall. · Make a closed fist with your thumb on top. · Push your hand into the wall and hold it for 6 seconds. Push with 25% to 50% of the force you have. Shoulder extension  · Stand with your back flat against a wall. · Keep your affected arm and elbow at your side, and bend your elbow so that your upper arm is against the wall and your lower arm is pointing straight ahead. Make a closed fist with your thumb on top. · Push your elbow gently back against the wall, holding for 6 seconds. Push with 25% to 50% of the force you have. Where can you learn more? Go to [email or web address]://[email or web address] [email or web address] [email or web address] org and sign in to your [employer] account. Enter U823 in the Bright Funds box to learn more about \"Rotator Cuff Rehabilitation. \"     If you do not have an account, please click on the \"Sign Up Now\" link.   Current as of: September 20, 2018  Content Version: 11.9  © 9779-2340 [employer]. Care instructions adapted under license by [facility] ([facility]). If you have questions about a medical condition or this instruction, always ask your healthcare professional. [address] any warranty or liability for your use of this information.

## 2019-04-04 NOTE — PROGRESS NOTES
MA left message for pt advising of coumadin dose and recheck date.     Electronically signed by Rosa Good MA on 4/4/19 at 9:23 AM

## 2019-04-13 DIAGNOSIS — I10 ESSENTIAL HYPERTENSION: ICD-10-CM

## 2019-04-15 RX ORDER — TORSEMIDE 10 MG/1
TABLET ORAL
Qty: 90 TABLET | Refills: 0 | Status: SHIPPED | OUTPATIENT
Start: 2019-04-15 | End: 2019-12-22 | Stop reason: SDUPTHER

## 2019-04-18 ENCOUNTER — HOSPITAL ENCOUNTER (OUTPATIENT)
Dept: MAMMOGRAPHY | Age: 64
Discharge: HOME | End: 2019-04-20
Payer: COMMERCIAL

## 2019-04-18 DIAGNOSIS — Z12.39 SCREENING FOR BREAST CANCER: ICD-10-CM

## 2019-04-18 PROCEDURE — 77063 BREAST TOMOSYNTHESIS BI: CPT

## 2019-04-23 DIAGNOSIS — M67.911 ROTATOR CUFF DISORDER, RIGHT: ICD-10-CM

## 2019-05-13 ENCOUNTER — OFFICE VISIT (OUTPATIENT)
Dept: FAMILY MEDICINE CLINIC | Age: 64
End: 2019-05-13
Payer: COMMERCIAL

## 2019-05-13 ENCOUNTER — ANTI-COAG VISIT (OUTPATIENT)
Dept: FAMILY MEDICINE CLINIC | Age: 64
End: 2019-05-13

## 2019-05-13 VITALS
TEMPERATURE: 98.6 F | HEIGHT: 69 IN | DIASTOLIC BLOOD PRESSURE: 82 MMHG | OXYGEN SATURATION: 94 % | SYSTOLIC BLOOD PRESSURE: 122 MMHG | BODY MASS INDEX: 40.58 KG/M2 | HEART RATE: 83 BPM | WEIGHT: 274 LBS | RESPIRATION RATE: 18 BRPM

## 2019-05-13 DIAGNOSIS — I48.20 CHRONIC ATRIAL FIBRILLATION (HCC): Primary | ICD-10-CM

## 2019-05-13 DIAGNOSIS — I10 ESSENTIAL HYPERTENSION: ICD-10-CM

## 2019-05-13 DIAGNOSIS — I48.20 CHRONIC ATRIAL FIBRILLATION (HCC): ICD-10-CM

## 2019-05-13 DIAGNOSIS — M67.911 DISORDER OF RIGHT ROTATOR CUFF: ICD-10-CM

## 2019-05-13 DIAGNOSIS — M19.011 PRIMARY OSTEOARTHRITIS OF RIGHT SHOULDER: ICD-10-CM

## 2019-05-13 LAB
INTERNATIONAL NORMALIZATION RATIO, POC: 2.4
PROTHROMBIN TIME, POC: NORMAL

## 2019-05-13 PROCEDURE — 1036F TOBACCO NON-USER: CPT | Performed by: FAMILY MEDICINE

## 2019-05-13 PROCEDURE — G8598 ASA/ANTIPLAT THER USED: HCPCS | Performed by: FAMILY MEDICINE

## 2019-05-13 PROCEDURE — 99213 OFFICE O/P EST LOW 20 MIN: CPT | Performed by: FAMILY MEDICINE

## 2019-05-13 PROCEDURE — G8427 DOCREV CUR MEDS BY ELIG CLIN: HCPCS | Performed by: FAMILY MEDICINE

## 2019-05-13 PROCEDURE — G8417 CALC BMI ABV UP PARAM F/U: HCPCS | Performed by: FAMILY MEDICINE

## 2019-05-13 PROCEDURE — 3017F COLORECTAL CA SCREEN DOC REV: CPT | Performed by: FAMILY MEDICINE

## 2019-05-13 PROCEDURE — 85610 PROTHROMBIN TIME: CPT | Performed by: FAMILY MEDICINE

## 2019-05-13 ASSESSMENT — ENCOUNTER SYMPTOMS
EYE ITCHING: 0
ABDOMINAL DISTENTION: 0
COLOR CHANGE: 0
VOICE CHANGE: 0
ALLERGIC/IMMUNOLOGIC NEGATIVE: 1
SINUS PAIN: 0
FACIAL SWELLING: 0
ORTHOPNEA: 0
CHOKING: 0
ABDOMINAL PAIN: 0
VOMITING: 0
PHOTOPHOBIA: 0
CHEST TIGHTNESS: 0
RHINORRHEA: 0
BLOOD IN STOOL: 0
EYES NEGATIVE: 1
BACK PAIN: 0
NAUSEA: 0
CONSTIPATION: 0
EYE PAIN: 0
EYE DISCHARGE: 0
RECTAL PAIN: 0
EYE REDNESS: 0
SINUS PRESSURE: 0
STRIDOR: 0
WHEEZING: 0
SHORTNESS OF BREATH: 0
SORE THROAT: 0
BLURRED VISION: 0
DIARRHEA: 0
RESPIRATORY NEGATIVE: 1
COUGH: 0
ANAL BLEEDING: 0
TROUBLE SWALLOWING: 0

## 2019-05-13 NOTE — PATIENT INSTRUCTIONS
Patient Education        Rotator Cuff: Exercises  Your Care Instructions  Here are some examples of typical rehabilitation exercises for your condition. Start each exercise slowly. Ease off the exercise if you start to have pain. Your doctor or physical therapist will tell you when you can start these exercises and which ones will work best for you. How to do the exercises  Pendulum swing    1. Hold on to a table or the back of a chair with your good arm. Then bend forward a little and let your sore arm hang straight down. This exercise does not use the arm muscles. Rather, use your legs and your hips to create movement that makes your arm swing freely. 2. Use the movement from your hips and legs to guide the slightly swinging arm back and forth like a pendulum (or elephant trunk). Then guide it in circles that start small (about the size of a dinner plate). Make the circles a bit larger each day, as your pain allows. 3. Do this exercise for 5 minutes, 5 to 7 times each day. 4. As you have less pain, try bending over a little farther to do this exercise. This will increase the amount of movement at your shoulder. Posterior stretching exercise    1. Hold the elbow of your injured arm with your other hand. 2. Use your hand to pull your injured arm gently up and across your body. You will feel a gentle stretch across the back of your injured shoulder. 3. Hold for at least 15 to 30 seconds. Then slowly lower your arm. 4. Repeat 2 to 4 times. Up-the-back stretch    1. Put your hand in your back pocket. Let it rest there to stretch your shoulder. 2. With your other hand, hold your injured arm (palm outward) behind your back by the wrist. Pull your arm up gently to stretch your shoulder. 3. Next, put a towel over your other shoulder. Put the hand of your injured arm behind your back. Now hold the back end of the towel. With the other hand, hold the front end of the towel in front of your body.  Pull gently on the front end of the towel. This will bring your hand farther up your back to stretch your shoulder. Overhead stretch    1. Standing about an arm's length away, grasp onto a solid surface. You could use a countertop, a doorknob, or the back of a sturdy chair. 2. With your knees slightly bent, bend forward with your arms straight. Lower your upper body, and let your shoulders stretch. 3. As your shoulders are able to stretch farther, you may need to take a step or two backward. 4. Hold for at least 15 to 30 seconds. Then stand up and relax. If you had stepped back during your stretch, step forward so you can keep your hands on the solid surface. 5. Repeat 2 to 4 times. Shoulder flexion (lying down)    1. Lie on your back, holding a wand with both hands. Your palms should face down as you hold the wand. 2. Keeping your elbows straight, slowly raise your arms over your head. Raise them until you feel a stretch in your shoulders, upper back, and chest.  3. Hold for 15 to 30 seconds. 4. Repeat 2 to 4 times. Shoulder rotation (lying down)    1. Lie on your back. Hold a wand with both hands with your elbows bent and palms up. 2. Keep your elbows close to your body, and move the wand across your body toward the sore arm. 3. Hold for 8 to 12 seconds. 4. Repeat 2 to 4 times. Wall climbing (to the side)    1. Stand with your side to a wall so that your fingers can just touch it at an angle about 30 degrees toward the front of your body. 2. Walk the fingers of your injured arm up the wall as high as pain permits. Try not to shrug your shoulder up toward your ear as you move your arm up. 3. Hold that position for a count of at least 15 to 20.  4. Walk your fingers back down to the starting position. 5. Repeat at least 2 to 4 times. Try to reach higher each time. Wall climbing (to the front)    1. Face a wall, and stand so your fingers can just touch it.   2. Keeping your shoulder down, walk the your side. 3. Hold one end of the elastic band in the hand of the painful arm. 4. Slowly rotate your forearm toward your body until it touches your belly. Slowly move it back to where you started. 5. Keep your elbow and upper arm firmly tucked against the towel roll or at your side. 6. Repeat 8 to 12 times. External rotator strengthening exercise    1. Start by tying a piece of elastic exercise material to a doorknob. You can use surgical tubing or Thera-Band. (You may also hold one end of the band in each hand.)  2. Stand or sit with your shoulder relaxed and your elbow bent 90 degrees. Your upper arm should rest comfortably against your side. Squeeze a rolled towel between your elbow and your body for comfort. This will help keep your arm at your side. 3. Hold one end of the elastic band with the hand of the painful arm. 4. Start with your forearm across your belly. Slowly rotate the forearm out away from your body. Keep your elbow and upper arm tucked against the towel roll or the side of your body until you begin to feel tightness in your shoulder. Slowly move your arm back to where you started. 5. Repeat 8 to 12 times. Follow-up care is a key part of your treatment and safety. Be sure to make and go to all appointments, and call your doctor if you are having problems. It's also a good idea to know your test results and keep a list of the medicines you take. Where can you learn more? Go to https://[employer]. org and sign in to your [employer] account. Enter Daisy Fortune in the [facility] box to learn more about \"Rotator Cuff: Exercises. \"     If you do not have an account, please click on the \"Sign Up Now\" link. Current as of: September 20, 2018  Content Version: 12.0  © 7417-5722 Healthwise, Incorporated. Care instructions adapted under license by [facility] ([facility]).  If you have questions about a medical condition or this instruction, always ask your healthcare professional. [place] disclaims any warranty or liability for your use of this information. Patient Education        Shoulder Arthritis: Exercises  Your Care Instructions  Here are some examples of typical rehabilitation exercises for your condition. Start each exercise slowly. Ease off the exercise if you start to have pain. Your doctor or physical therapist will tell you when you can start these exercises and which ones will work best for you. How to do the exercises  Shoulder flexion (lying down)    1. Lie on your back, holding a wand with both hands. Your palms should face down as you hold the wand. 2. Keeping your elbows straight, slowly raise your arms over your head. Raise them until you feel a stretch in your shoulders, upper back, and chest.  3. Hold for 15 to 30 seconds. 4. Repeat 2 to 4 times. Shoulder rotation (lying down)    1. Lie on your back. Hold a wand with both hands with your elbows bent and palms up. 2. Keep your elbows close to your body, and move the wand across your body toward the sore arm. 3. Hold for 8 to 12 seconds. 4. Repeat 2 to 4 times. Shoulder internal rotation with towel    1. Hold a towel above and behind your head with the arm that is not sore. 2. With your sore arm, reach behind your back and grasp the towel. 3. With the arm above your head, pull the towel upward. Do this until you feel a stretch on the front and outside of your sore shoulder. 4. Hold 15 to 30 seconds. 5. Repeat 2 to 4 times. Shoulder blade squeeze    1. Stand with your arms at your sides, and squeeze your shoulder blades together. Do not raise your shoulders up as you squeeze. 2. Hold 6 seconds. 3. Repeat 8 to 12 times. Resisted rows    1. Put the band around a solid object at about waist level. (A bedpost will work well.) Each hand should hold an end of the band. 2. With your elbows at your sides and bent to 90 degrees, pull the band back.  Your shoulder blades should move toward each other. Return to the starting position. 3. Repeat 8 to 12 times. External rotator strengthening exercise    1. Start by tying a piece of elastic exercise material to a doorknob. You can use surgical tubing or Thera-Band. (You may also hold one end of the band in each hand.)  2. Stand or sit with your shoulder relaxed and your elbow bent 90 degrees. Your upper arm should rest comfortably against your side. Squeeze a rolled towel between your elbow and your body for comfort. This will help keep your arm at your side. 3. Hold one end of the elastic band with the hand of the painful arm. 4. Start with your forearm across your belly. Slowly rotate the forearm out away from your body. Keep your elbow and upper arm tucked against the towel roll or the side of your body until you begin to feel tightness in your shoulder. Slowly move your arm back to where you started. 5. Repeat 8 to 12 times. Internal rotator strengthening exercise    1. Start by tying a piece of elastic exercise material to a doorknob. You can use surgical tubing or Thera-Band. 2. Stand or sit with your shoulder relaxed and your elbow bent 90 degrees. Your upper arm should rest comfortably against your side. Squeeze a rolled towel between your elbow and your body for comfort. This will help keep your arm at your side. 3. Hold one end of the elastic band in the hand of the painful arm. 4. Slowly rotate your forearm toward your body until it touches your belly. Slowly move it back to where you started. 5. Keep your elbow and upper arm firmly tucked against the towel roll or at your side. 6. Repeat 8 to 12 times. Pendulum swing    1. Hold on to a table or the back of a chair with your good arm. Then bend forward a little and let your sore arm hang straight down. This exercise does not use the arm muscles. Rather, use your legs and your hips to create movement that makes your arm swing freely.   2. Use the movement from your hips and legs to guide the slightly swinging arm back and forth like a pendulum (or elephant trunk). Then guide it in circles that start small (about the size of a dinner plate). Make the circles a bit larger each day, as your pain allows. 3. Do this exercise for 5 minutes, 5 to 7 times each day. 4. As you have less pain, try bending over a little farther to do this exercise. This will increase the amount of movement at your shoulder. Follow-up care is a key part of your treatment and safety. Be sure to make and go to all appointments, and call your doctor if you are having problems. It's also a good idea to know your test results and keep a list of the medicines you take. Where can you learn more? Go to https://[employer]. org and sign in to your [employer] account. Enter H562 in the [employer] box to learn more about \"Shoulder Arthritis: Exercises. \"     If you do not have an account, please click on the \"Sign Up Now\" link. Current as of: September 20, 2018  Content Version: 12.0  © 2662-0452 Healthwise, Incorporated. Care instructions adapted under license by [facility] ([facility]). If you have questions about a medical condition or this instruction, always ask your healthcare professional. [address] any warranty or liability for your use of this information.

## 2019-05-13 NOTE — PROGRESS NOTES
SUBJECTIVE  Alicia Mcdonald is a 59 y.o. female. HPI/Chief C/O:  Chief Complaint   Patient presents with    Results     Pt here to review results of shoulder XR - pt c/o ongoing issues, mobility issues, pain     Allergies   Allergen Reactions    Prednisone    She is here to evaluate shoulder and go over X ray    Arm Pain    The incident occurred more than 1 week ago. The injury mechanism was repetitive motion. The pain is present in the right shoulder. The quality of the pain is described as burning and aching. The pain is at a severity of 8/10. The pain is moderate. Associated symptoms include muscle weakness. Pertinent negatives include no chest pain, numbness or tingling. The symptoms are aggravated by movement and lifting. Hypertension   This is a chronic problem. The current episode started more than 1 year ago. The problem is controlled. Pertinent negatives include no anxiety, blurred vision, chest pain, headaches, malaise/fatigue, neck pain, orthopnea, palpitations, peripheral edema, PND, shortness of breath or sweats. Risk factors for coronary artery disease include post-menopausal state, obesity, family history and smoking/tobacco exposure. Past treatments include lifestyle changes, beta blockers and diuretics. The current treatment provides significant improvement. Compliance problems include exercise and diet. Hypertensive end-organ damage includes CAD/MI (H/O chronic atrial fibrillation). There is no history of angina, kidney disease, CVA, heart failure, left ventricular hypertrophy, PVD or retinopathy. There is no history of chronic renal disease, coarctation of the aorta, hyperaldosteronism, hypercortisolism, hyperparathyroidism, a hypertension causing med, pheochromocytoma, renovascular disease, sleep apnea or a thyroid problem. ROS:  Review of Systems   Constitutional: Positive for fatigue.  Negative for activity change, appetite change, chills, diaphoresis, fever, malaise/fatigue and fibrillation ([facility] [address]     Coronary heart disease 2013    Primary osteoarthritis of right shoulder 2019     Past Surgical History:   Procedure Laterality Date     SECTION      DILATION AND CURETTAGE OF UTERUS      ECHO COMPL W DOP COLOR FLOW  2013         TONSILLECTOMY         Past Family Hx:  Reviewed with patient  History reviewed. No pertinent family history. Social Hx:  Reviewed with patient  Social History     Tobacco Use    Smoking status: Former Smoker     Packs/day: 2.00     Years: 15.00     Pack years: 30.00     Types: Cigarettes     Start date: 1972     Last attempt to quit: 1987     Years since quittin.3    Smokeless tobacco: Never Used   Substance Use Topics    Alcohol use: No       OBJECTIVE  /82   Pulse 83   Temp 98.6 °F (37 °C) (Temporal)   Resp 18   Ht 5' 9\" (1.753 m)   Wt 274 lb (124.3 kg)   SpO2 94%   BMI 40.46 kg/m²     Problem List:  Connie Varma does not have any pertinent problems on file. PHYS EX:  Physical Exam   Constitutional: She is oriented to person, place, and time. She appears well-developed and well-nourished. No distress. HENT:   Head: Normocephalic and atraumatic. Right Ear: External ear normal.   Left Ear: External ear normal.   Nose: Nose normal.   Mouth/Throat: Oropharynx is clear and moist. No oropharyngeal exudate. Pt has dental carries. Eyes: Pupils are equal, round, and reactive to light. Conjunctivae and EOM are normal. Right eye exhibits no discharge. Left eye exhibits no discharge. No scleral icterus. Neck: Normal range of motion. Neck supple. No JVD present. No tracheal deviation present. No thyromegaly present. Cardiovascular: Normal rate, normal heart sounds and intact distal pulses. An irregularly irregular rhythm present. No extrasystoles are present. PMI is not displaced. Exam reveals no gallop, no distant heart sounds and no friction rub. No murmur heard. No systolic murmur is present.    No diastolic murmur is present. Pt has chronic atrial fib. ([address] Pt has varicose veins maggie. Lower extremities. Pulmonary/Chest: Effort normal and breath sounds normal. No stridor. No respiratory distress. She has no wheezes. She has no rales. She exhibits no tenderness. Abdominal: Soft. Bowel sounds are normal. She exhibits no distension and no mass. There is no tenderness. There is no rebound and no guarding. No hernia. Musculoskeletal: She exhibits tenderness (painful right shoulder, deltoid with limit to ROM). She exhibits no edema or deformity. Right shoulder: She exhibits decreased range of motion, tenderness, pain, spasm and decreased strength. She exhibits no bony tenderness, no swelling, no effusion, no crepitus, no deformity, no laceration and normal pulse. Arms:  Lymphadenopathy:     She has no cervical adenopathy. Neurological: She is alert and oriented to person, place, and time. She has normal reflexes. She displays normal reflexes. No cranial nerve deficit or sensory deficit. She exhibits normal muscle tone. Coordination normal.   Skin: Skin is warm. No rash noted. She is not diaphoretic. No erythema. No pallor. Nursing note and vitals reviewed. ASSESSMENT/PLAN  Ledell Alpers was seen today for results.     Diagnoses and all orders for this visit:    Chronic atrial fibrillation (HCC)  -     POCT INR    Essential hypertension ---controlled   ---CARDIAC---COUMADIN, ace, BETA, statin, DEMADEX, ( ccb )    Disorder of right rotator cuff  --OMT / ultra--Rx    Primary osteoarthritis of right shoulder  -- she refused and MRI, PT and an ortho consult--AMA        Outpatient Encounter Medications as of 5/13/2019   Medication Sig Dispense Refill    torsemide (DEMADEX) 10 MG tablet TAKE 1 TABLET BY MOUTH DAILY 90 tablet 0    sotalol (BETAPACE) 120 MG tablet TAKE 1 TABLET BY MOUTH TWICE DAILY 60 tablet 5    diclofenac sodium 1 % GEL Apply 2 g topically 4 times daily 2 Tube 1    warfarin

## 2019-05-14 NOTE — PROGRESS NOTES
Left message for pt advising no change to Coumadin dose and recheck date.     Electronically signed by iKsha Hernandez MA on 5/14/19 at 10:44 AM

## 2019-07-01 ENCOUNTER — HOSPITAL ENCOUNTER (OUTPATIENT)
Age: 64
Discharge: HOME | End: 2019-07-01
Payer: COMMERCIAL

## 2019-07-01 LAB
INR BLD: 2.1
PROTHROMBIN TIME: 23.7 SEC (ref 9.3–12.4)

## 2019-07-01 PROCEDURE — 85610 PROTHROMBIN TIME: CPT

## 2019-07-01 PROCEDURE — 36415 COLL VENOUS BLD VENIPUNCTURE: CPT

## 2019-07-02 ENCOUNTER — ANTI-COAG VISIT (OUTPATIENT)
Dept: FAMILY MEDICINE CLINIC | Age: 64
End: 2019-07-02

## 2019-07-02 DIAGNOSIS — I48.20 CHRONIC ATRIAL FIBRILLATION (HCC): ICD-10-CM

## 2019-07-02 RX ORDER — WARFARIN SODIUM 2.5 MG/1
TABLET ORAL
Qty: 60 TABLET | Refills: 5 | Status: SHIPPED
Start: 2019-07-02 | End: 2020-04-03 | Stop reason: SDUPTHER

## 2019-09-04 ENCOUNTER — HOSPITAL ENCOUNTER (OUTPATIENT)
Age: 64
Discharge: HOME | End: 2019-09-06
Payer: COMMERCIAL

## 2019-09-04 ENCOUNTER — ANTI-COAG VISIT (OUTPATIENT)
Dept: FAMILY MEDICINE CLINIC | Age: 64
End: 2019-09-04

## 2019-09-04 ENCOUNTER — OFFICE VISIT (OUTPATIENT)
Dept: FAMILY MEDICINE CLINIC | Age: 64
End: 2019-09-04
Payer: COMMERCIAL

## 2019-09-04 VITALS
WEIGHT: 278 LBS | HEIGHT: 69 IN | HEART RATE: 96 BPM | TEMPERATURE: 98.7 F | DIASTOLIC BLOOD PRESSURE: 74 MMHG | OXYGEN SATURATION: 97 % | BODY MASS INDEX: 41.18 KG/M2 | SYSTOLIC BLOOD PRESSURE: 122 MMHG

## 2019-09-04 DIAGNOSIS — R31.9 URINARY TRACT INFECTION WITH HEMATURIA, SITE UNSPECIFIED: ICD-10-CM

## 2019-09-04 DIAGNOSIS — I48.20 CHRONIC ATRIAL FIBRILLATION (HCC): ICD-10-CM

## 2019-09-04 DIAGNOSIS — I10 ESSENTIAL HYPERTENSION: ICD-10-CM

## 2019-09-04 DIAGNOSIS — Z12.4 SCREENING FOR CERVICAL CANCER: ICD-10-CM

## 2019-09-04 DIAGNOSIS — N39.0 URINARY TRACT INFECTION WITH HEMATURIA, SITE UNSPECIFIED: ICD-10-CM

## 2019-09-04 DIAGNOSIS — I48.20 CHRONIC ATRIAL FIBRILLATION (HCC): Primary | ICD-10-CM

## 2019-09-04 LAB
ALBUMIN SERPL-MCNC: 4.2 G/DL (ref 3.5–5.2)
ALP BLD-CCNC: 54 U/L (ref 35–104)
ALT SERPL-CCNC: 13 U/L (ref 0–32)
ANION GAP SERPL CALCULATED.3IONS-SCNC: 7 MMOL/L (ref 7–16)
AST SERPL-CCNC: 18 U/L (ref 0–31)
BASOPHILS ABSOLUTE: 0.07 E9/L (ref 0–0.2)
BASOPHILS RELATIVE PERCENT: 0.8 % (ref 0–2)
BILIRUB SERPL-MCNC: 0.7 MG/DL (ref 0–1.2)
BILIRUBIN, POC: NORMAL
BLOOD URINE, POC: NORMAL
BUN BLDV-MCNC: 13 MG/DL (ref 8–23)
CALCIUM SERPL-MCNC: 9.8 MG/DL (ref 8.6–10.2)
CHLORIDE BLD-SCNC: 106 MMOL/L (ref 98–107)
CLARITY, POC: CLEAR
CO2: 31 MMOL/L (ref 22–29)
COLOR, POC: YELLOW
CREAT SERPL-MCNC: 0.8 MG/DL (ref 0.5–1)
EOSINOPHILS ABSOLUTE: 0.12 E9/L (ref 0.05–0.5)
EOSINOPHILS RELATIVE PERCENT: 1.4 % (ref 0–6)
GFR AFRICAN AMERICAN: >60
GFR NON-AFRICAN AMERICAN: >60 ML/MIN/1.73
GLUCOSE BLD-MCNC: 100 MG/DL (ref 74–99)
GLUCOSE URINE, POC: NORMAL
HCT VFR BLD CALC: 41.2 % (ref 34–48)
HEMOGLOBIN: 12.6 G/DL (ref 11.5–15.5)
IMMATURE GRANULOCYTES #: 0.02 E9/L
IMMATURE GRANULOCYTES %: 0.2 % (ref 0–5)
INTERNATIONAL NORMALIZATION RATIO, POC: 1.4
KETONES, POC: NORMAL
LEUKOCYTE EST, POC: NORMAL
LYMPHOCYTES ABSOLUTE: 1.44 E9/L (ref 1.5–4)
LYMPHOCYTES RELATIVE PERCENT: 17.3 % (ref 20–42)
MCH RBC QN AUTO: 28.3 PG (ref 26–35)
MCHC RBC AUTO-ENTMCNC: 30.6 % (ref 32–34.5)
MCV RBC AUTO: 92.6 FL (ref 80–99.9)
MONOCYTES ABSOLUTE: 0.7 E9/L (ref 0.1–0.95)
MONOCYTES RELATIVE PERCENT: 8.4 % (ref 2–12)
NEUTROPHILS ABSOLUTE: 5.96 E9/L (ref 1.8–7.3)
NEUTROPHILS RELATIVE PERCENT: 71.9 % (ref 43–80)
NITRITE, POC: NORMAL
PDW BLD-RTO: 13.3 FL (ref 11.5–15)
PH, POC: 7
PLATELET # BLD: 245 E9/L (ref 130–450)
PMV BLD AUTO: 11.7 FL (ref 7–12)
POTASSIUM SERPL-SCNC: 4.2 MMOL/L (ref 3.5–5)
PROTEIN, POC: NORMAL
PROTHROMBIN TIME, POC: NORMAL
RBC # BLD: 4.45 E12/L (ref 3.5–5.5)
SODIUM BLD-SCNC: 144 MMOL/L (ref 132–146)
SPECIFIC GRAVITY, POC: 1.05
TOTAL PROTEIN: 7.8 G/DL (ref 6.4–8.3)
UROBILINOGEN, POC: 0.2
WBC # BLD: 8.3 E9/L (ref 4.5–11.5)

## 2019-09-04 PROCEDURE — G8417 CALC BMI ABV UP PARAM F/U: HCPCS | Performed by: FAMILY MEDICINE

## 2019-09-04 PROCEDURE — 85610 PROTHROMBIN TIME: CPT | Performed by: FAMILY MEDICINE

## 2019-09-04 PROCEDURE — 85025 COMPLETE CBC W/AUTO DIFF WBC: CPT

## 2019-09-04 PROCEDURE — 3017F COLORECTAL CA SCREEN DOC REV: CPT | Performed by: FAMILY MEDICINE

## 2019-09-04 PROCEDURE — 1036F TOBACCO NON-USER: CPT | Performed by: FAMILY MEDICINE

## 2019-09-04 PROCEDURE — 81003 URINALYSIS AUTO W/O SCOPE: CPT | Performed by: FAMILY MEDICINE

## 2019-09-04 PROCEDURE — G8427 DOCREV CUR MEDS BY ELIG CLIN: HCPCS | Performed by: FAMILY MEDICINE

## 2019-09-04 PROCEDURE — G8598 ASA/ANTIPLAT THER USED: HCPCS | Performed by: FAMILY MEDICINE

## 2019-09-04 PROCEDURE — 80053 COMPREHEN METABOLIC PANEL: CPT

## 2019-09-04 PROCEDURE — 99213 OFFICE O/P EST LOW 20 MIN: CPT | Performed by: FAMILY MEDICINE

## 2019-09-04 RX ORDER — CEFDINIR 300 MG/1
300 CAPSULE ORAL 2 TIMES DAILY
Qty: 20 CAPSULE | Refills: 0 | Status: SHIPPED | OUTPATIENT
Start: 2019-09-04 | End: 2019-09-14

## 2019-09-04 NOTE — PROGRESS NOTES
Message left on voicemail with coumadin instructions per patient request.  Electronically signed by Liset Lopez on 9/4/2019 at 2:51 PM

## 2019-09-06 ENCOUNTER — TELEPHONE (OUTPATIENT)
Dept: FAMILY MEDICINE CLINIC | Age: 64
End: 2019-09-06

## 2019-09-09 ASSESSMENT — ENCOUNTER SYMPTOMS
CHOKING: 0
EYES NEGATIVE: 1
PHOTOPHOBIA: 0
ABDOMINAL PAIN: 0
CHEST TIGHTNESS: 0
NAUSEA: 0
SHORTNESS OF BREATH: 0
ABDOMINAL DISTENTION: 0
VOICE CHANGE: 0
ORTHOPNEA: 0
RESPIRATORY NEGATIVE: 1
BLURRED VISION: 0
EYE DISCHARGE: 0
COLOR CHANGE: 0
DIARRHEA: 0
ANAL BLEEDING: 0
RECTAL PAIN: 0
BACK PAIN: 0
SORE THROAT: 0
EYE REDNESS: 0
WHEEZING: 0
CONSTIPATION: 0
TROUBLE SWALLOWING: 0
FACIAL SWELLING: 0
BLOOD IN STOOL: 0
ALLERGIC/IMMUNOLOGIC NEGATIVE: 1
VOMITING: 0
EYE ITCHING: 0
EYE PAIN: 0
SINUS PRESSURE: 0
COUGH: 0
RHINORRHEA: 0
SINUS PAIN: 0
STRIDOR: 0

## 2019-09-09 NOTE — PROGRESS NOTES
SUBJECTIVE  Clayton Forman is a 59 y.o. female. HPI/Chief C/O:  Chief Complaint   Patient presents with    Urinary Tract Infection     burning with urination; polyuria; urge; hematuria     Allergies   Allergen Reactions    Prednisone    She is here to evaluate shoulder and go over X ray    Arm Pain    The incident occurred more than 1 week ago. The injury mechanism was repetitive motion. The pain is present in the right shoulder. The quality of the pain is described as burning and aching. The pain is at a severity of 8/10. The pain is moderate. Associated symptoms include muscle weakness. Pertinent negatives include no chest pain, numbness or tingling. The symptoms are aggravated by movement and lifting. Hypertension   This is a chronic problem. The current episode started more than 1 year ago. The problem is controlled. Pertinent negatives include no anxiety, blurred vision, chest pain, headaches, malaise/fatigue, neck pain, orthopnea, palpitations, peripheral edema, PND, shortness of breath or sweats. Risk factors for coronary artery disease include post-menopausal state, obesity, family history and smoking/tobacco exposure. Past treatments include lifestyle changes, beta blockers and diuretics. The current treatment provides significant improvement. Compliance problems include exercise and diet. Hypertensive end-organ damage includes CAD/MI (H/O chronic atrial fibrillation). There is no history of angina, kidney disease, CVA, heart failure, left ventricular hypertrophy, PVD or retinopathy. There is no history of chronic renal disease, coarctation of the aorta, hyperaldosteronism, hypercortisolism, hyperparathyroidism, a hypertension causing med, pheochromocytoma, renovascular disease, sleep apnea or a thyroid problem. Urinary Tract Infection    Associated symptoms include frequency and urgency. Pertinent negatives include no chills, flank pain, hematuria, nausea, sweats or vomiting. disturbance and suicidal ideas. The patient is not nervous/anxious and is not hyperactive. Past Medical/Surgical Hx;  Reviewed with patient      Diagnosis Date    Atrial fibrillation ([address]     Coronary heart disease 2013    Primary osteoarthritis of right shoulder 2019     Past Surgical History:   Procedure Laterality Date     SECTION      DILATION AND CURETTAGE OF UTERUS      ECHO COMPL W DOP COLOR FLOW  2013         TONSILLECTOMY         Past Family Hx:  Reviewed with patient  History reviewed. No pertinent family history. Social Hx:  Reviewed with patient  Social History     Tobacco Use    Smoking status: Former Smoker     Packs/day: 2.00     Years: 15.00     Pack years: 30.00     Types: Cigarettes     Start date: 1972     Last attempt to quit: 1987     Years since quittin.7    Smokeless tobacco: Never Used   Substance Use Topics    Alcohol use: No       OBJECTIVE  /74   Pulse 96   Temp 98.7 °F (37.1 °C)   Ht 5' 9\" (1.753 m)   Wt 278 lb (126.1 kg)   SpO2 97%   Breastfeeding? No   BMI 41.05 kg/m²     Problem List:  Alexey Lebron does not have any pertinent problems on file. PHYS EX:  Physical Exam   Constitutional: She is oriented to person, place, and time. She appears well-developed and well-nourished. No distress. HENT:   Head: Normocephalic and atraumatic. Right Ear: External ear normal.   Left Ear: External ear normal.   Nose: Nose normal.   Mouth/Throat: Oropharynx is clear and moist. No oropharyngeal exudate. Pt has dental carries. Eyes: Pupils are equal, round, and reactive to light. Conjunctivae and EOM are normal. Right eye exhibits no discharge. Left eye exhibits no discharge. No scleral icterus. Neck: Normal range of motion. Neck supple. No JVD present. No tracheal deviation present. No thyromegaly present. Cardiovascular: Normal rate, normal heart sounds and intact distal pulses. An irregularly irregular rhythm present.   No extrasystoles are present. PMI is not displaced. Exam reveals no gallop, no distant heart sounds and no friction rub. No murmur heard. No systolic murmur is present. No diastolic murmur is present. Pt has chronic atrial fib. ([address] Pt has varicose veins maggie. Lower extremities. Pulmonary/Chest: Effort normal and breath sounds normal. No stridor. No respiratory distress. She has no wheezes. She has no rales. She exhibits no tenderness. Abdominal: Soft. Bowel sounds are normal. She exhibits no distension and no mass. There is no tenderness. There is no rebound and no guarding. No hernia. Musculoskeletal: She exhibits tenderness (painful right shoulder, deltoid with limit to ROM). She exhibits no edema or deformity. Right shoulder: She exhibits decreased range of motion, tenderness, pain, spasm and decreased strength. She exhibits no bony tenderness, no swelling, no effusion, no crepitus, no deformity, no laceration and normal pulse. Arms:  Lymphadenopathy:     She has no cervical adenopathy. Neurological: She is alert and oriented to person, place, and time. She has normal reflexes. She displays normal reflexes. No cranial nerve deficit or sensory deficit. She exhibits normal muscle tone. Coordination normal.   Skin: Skin is warm. No rash noted. She is not diaphoretic. No erythema. No pallor. Nursing note and vitals reviewed. ASSESSMENT/PLAN  Jose Reza was seen today for results. Diagnoses and all orders for this visit:    Chronic atrial fibrillation (HCC)  -     POCT INR    Essential hypertension ---controlled   ---CARDIAC---COUMADIN, ace, BETA, statin, DEMADEX, ( ccb )    Disorder of right rotator cuff  --OMT / ultra--Rx    Primary osteoarthritis of right shoulder  -- she refused and MRI, PT and an ortho consult--AMA    UTI   Not controlled. Omnicef. Patient instructed on treatment and prevention UTI. Pt instructed any increase in hematuria to call ASAP.  Pt states she notice

## 2019-10-11 DIAGNOSIS — I10 ESSENTIAL HYPERTENSION: ICD-10-CM

## 2019-10-11 RX ORDER — SOTALOL HYDROCHLORIDE 120 MG/1
TABLET ORAL
Qty: 180 TABLET | Refills: 1 | Status: SHIPPED
Start: 2019-10-11 | End: 2020-04-03 | Stop reason: SDUPTHER

## 2019-11-17 ENCOUNTER — HOSPITAL ENCOUNTER (OUTPATIENT)
Age: 64
Discharge: HOME | End: 2019-11-17
Payer: COMMERCIAL

## 2019-11-17 LAB
INR BLD: 2.3
PROTHROMBIN TIME: 26.1 SEC (ref 9.3–12.4)

## 2019-11-17 PROCEDURE — 85610 PROTHROMBIN TIME: CPT

## 2019-11-17 PROCEDURE — 36415 COLL VENOUS BLD VENIPUNCTURE: CPT

## 2019-11-18 ENCOUNTER — ANTI-COAG VISIT (OUTPATIENT)
Dept: FAMILY MEDICINE CLINIC | Age: 64
End: 2019-11-18
Payer: COMMERCIAL

## 2019-11-18 DIAGNOSIS — I48.91 ATRIAL FIBRILLATION, UNSPECIFIED TYPE (HCC): ICD-10-CM

## 2019-11-18 PROCEDURE — 93793 ANTICOAG MGMT PT WARFARIN: CPT | Performed by: FAMILY MEDICINE

## 2020-04-03 ENCOUNTER — OFFICE VISIT (OUTPATIENT)
Dept: FAMILY MEDICINE CLINIC | Age: 65
End: 2020-04-03
Payer: COMMERCIAL

## 2020-04-03 VITALS
OXYGEN SATURATION: 99 % | RESPIRATION RATE: 18 BRPM | HEART RATE: 59 BPM | DIASTOLIC BLOOD PRESSURE: 74 MMHG | TEMPERATURE: 97.2 F | HEIGHT: 69 IN | WEIGHT: 272.6 LBS | BODY MASS INDEX: 40.38 KG/M2 | SYSTOLIC BLOOD PRESSURE: 122 MMHG

## 2020-04-03 LAB
INTERNATIONAL NORMALIZATION RATIO, POC: 2.1
PROTHROMBIN TIME, POC: NORMAL

## 2020-04-03 PROCEDURE — 85610 PROTHROMBIN TIME: CPT | Performed by: FAMILY MEDICINE

## 2020-04-03 PROCEDURE — G8427 DOCREV CUR MEDS BY ELIG CLIN: HCPCS | Performed by: FAMILY MEDICINE

## 2020-04-03 PROCEDURE — 3017F COLORECTAL CA SCREEN DOC REV: CPT | Performed by: FAMILY MEDICINE

## 2020-04-03 PROCEDURE — 99213 OFFICE O/P EST LOW 20 MIN: CPT | Performed by: FAMILY MEDICINE

## 2020-04-03 PROCEDURE — G8417 CALC BMI ABV UP PARAM F/U: HCPCS | Performed by: FAMILY MEDICINE

## 2020-04-03 PROCEDURE — 1036F TOBACCO NON-USER: CPT | Performed by: FAMILY MEDICINE

## 2020-04-03 RX ORDER — SOTALOL HYDROCHLORIDE 120 MG/1
TABLET ORAL
Qty: 180 TABLET | Refills: 1 | Status: SHIPPED
Start: 2020-04-03 | End: 2020-07-13

## 2020-04-03 RX ORDER — WARFARIN SODIUM 2.5 MG/1
TABLET ORAL
Qty: 60 TABLET | Refills: 5 | Status: SHIPPED
Start: 2020-04-03 | End: 2020-10-19 | Stop reason: SDUPTHER

## 2020-04-03 RX ORDER — TORSEMIDE 10 MG/1
TABLET ORAL
Qty: 90 TABLET | Refills: 1 | Status: SHIPPED
Start: 2020-04-03 | End: 2020-10-19 | Stop reason: SDUPTHER

## 2020-04-03 ASSESSMENT — PATIENT HEALTH QUESTIONNAIRE - PHQ9
SUM OF ALL RESPONSES TO PHQ QUESTIONS 1-9: 0
1. LITTLE INTEREST OR PLEASURE IN DOING THINGS: 0
SUM OF ALL RESPONSES TO PHQ QUESTIONS 1-9: 0
2. FEELING DOWN, DEPRESSED OR HOPELESS: 0
SUM OF ALL RESPONSES TO PHQ9 QUESTIONS 1 & 2: 0

## 2020-04-04 ASSESSMENT — ENCOUNTER SYMPTOMS
ABDOMINAL PAIN: 0
STRIDOR: 0
ORTHOPNEA: 0
BLURRED VISION: 0
SORE THROAT: 0
COLOR CHANGE: 0
VOMITING: 0
EYE REDNESS: 0
COUGH: 0
EYE DISCHARGE: 0
PHOTOPHOBIA: 0
EYE ITCHING: 0
ALLERGIC/IMMUNOLOGIC NEGATIVE: 1
CHEST TIGHTNESS: 0
RESPIRATORY NEGATIVE: 1
ABDOMINAL DISTENTION: 0
FACIAL SWELLING: 0
CHOKING: 0
SINUS PAIN: 0
EYE PAIN: 0
CONSTIPATION: 0
BACK PAIN: 0
VOICE CHANGE: 0
SINUS PRESSURE: 0
WHEEZING: 0
BLOOD IN STOOL: 0
RECTAL PAIN: 0
RHINORRHEA: 0
ANAL BLEEDING: 0
TROUBLE SWALLOWING: 0
NAUSEA: 0
EYES NEGATIVE: 1
DIARRHEA: 0
SHORTNESS OF BREATH: 0

## 2020-04-04 NOTE — PROGRESS NOTES
effusion, no crepitus, no deformity, no laceration and normal pulse. Arms:       Right lower leg: No edema. Left lower leg: No edema. Comments: Pain and decreased ROM multiple joints. Lymphadenopathy:      Cervical: No cervical adenopathy. Skin:     General: Skin is warm. Coloration: Skin is not jaundiced or pale. Findings: No bruising, erythema, lesion or rash. Neurological:      General: No focal deficit present. Mental Status: She is alert and oriented to person, place, and time. Cranial Nerves: No cranial nerve deficit. Sensory: No sensory deficit. Motor: No weakness or abnormal muscle tone. Coordination: Coordination normal.      Gait: Gait normal.      Deep Tendon Reflexes: Reflexes are normal and symmetric. Reflexes normal.   Psychiatric:         Mood and Affect: Mood normal.         Behavior: Behavior normal.         Thought Content: Thought content normal.         Judgment: Judgment normal.         ASSESSMENT/PLAN  Haylee Celeste was seen today for results. Diagnoses and all orders for this visit:    Chronic atrial fibrillation ([address]  Stable. Coumadin, INR. Essential hypertension ---controlled   Controlled. Betapace, demadex, low salt diet. Pt refuses statin AMA. Disorder of right rotator cuff  --OMT / ultra--Rx  Not controlled. Baclofen. Primary osteoarthritis of right shoulder  -- she refused and MRI, PT and an ortho consult--AM      Pt instructed if any worse go ED ASAP.     Outpatient Encounter Medications as of 4/3/2020   Medication Sig Dispense Refill    torsemide (DEMADEX) 10 MG tablet TAKE 1 TABLET BY MOUTH DAILY 90 tablet 1    sotalol (BETAPACE) 120 MG tablet TAKE 1 TABLET BY MOUTH TWICE DAILY 180 tablet 1    warfarin (COUMADIN) 2.5 MG tablet Take one tablet daily or as directed 60 tablet 5    baclofen (LIORESAL) 10 MG tablet TAKE 1 TABLET BY MOUTH EVERY EVENING 90 tablet 0    [DISCONTINUED] torsemide (DEMADEX) 10 MG tablet TAKE 1

## 2020-07-13 RX ORDER — SOTALOL HYDROCHLORIDE 120 MG/1
TABLET ORAL
Qty: 180 TABLET | Refills: 0 | Status: SHIPPED
Start: 2020-07-13 | End: 2020-10-19 | Stop reason: SDUPTHER

## 2020-10-09 RX ORDER — SOTALOL HYDROCHLORIDE 120 MG/1
TABLET ORAL
Qty: 180 TABLET | Refills: 0 | OUTPATIENT
Start: 2020-10-09

## 2020-10-12 RX ORDER — SOTALOL HYDROCHLORIDE 120 MG/1
TABLET ORAL
Qty: 180 TABLET | Refills: 0 | OUTPATIENT
Start: 2020-10-12

## 2020-10-19 ENCOUNTER — ANTI-COAG VISIT (OUTPATIENT)
Dept: FAMILY MEDICINE CLINIC | Age: 65
End: 2020-10-19

## 2020-10-19 ENCOUNTER — OFFICE VISIT (OUTPATIENT)
Dept: FAMILY MEDICINE CLINIC | Age: 65
End: 2020-10-19
Payer: COMMERCIAL

## 2020-10-19 VITALS
TEMPERATURE: 98.6 F | WEIGHT: 276 LBS | SYSTOLIC BLOOD PRESSURE: 118 MMHG | DIASTOLIC BLOOD PRESSURE: 82 MMHG | BODY MASS INDEX: 40.88 KG/M2 | OXYGEN SATURATION: 96 % | HEART RATE: 81 BPM | HEIGHT: 69 IN | RESPIRATION RATE: 18 BRPM

## 2020-10-19 LAB
CHP ED QC CHECK: NORMAL
GLUCOSE BLD-MCNC: 109 MG/DL
HBA1C MFR BLD: 5.6 %
INTERNATIONAL NORMALIZATION RATIO, POC: 1.5
PROTHROMBIN TIME, POC: 18

## 2020-10-19 PROCEDURE — G8417 CALC BMI ABV UP PARAM F/U: HCPCS | Performed by: FAMILY MEDICINE

## 2020-10-19 PROCEDURE — G8427 DOCREV CUR MEDS BY ELIG CLIN: HCPCS | Performed by: FAMILY MEDICINE

## 2020-10-19 PROCEDURE — 1036F TOBACCO NON-USER: CPT | Performed by: FAMILY MEDICINE

## 2020-10-19 PROCEDURE — 83036 HEMOGLOBIN GLYCOSYLATED A1C: CPT | Performed by: FAMILY MEDICINE

## 2020-10-19 PROCEDURE — G8484 FLU IMMUNIZE NO ADMIN: HCPCS | Performed by: FAMILY MEDICINE

## 2020-10-19 PROCEDURE — 99213 OFFICE O/P EST LOW 20 MIN: CPT | Performed by: FAMILY MEDICINE

## 2020-10-19 PROCEDURE — 82962 GLUCOSE BLOOD TEST: CPT | Performed by: FAMILY MEDICINE

## 2020-10-19 PROCEDURE — 3017F COLORECTAL CA SCREEN DOC REV: CPT | Performed by: FAMILY MEDICINE

## 2020-10-19 PROCEDURE — 85610 PROTHROMBIN TIME: CPT | Performed by: FAMILY MEDICINE

## 2020-10-19 RX ORDER — SOTALOL HYDROCHLORIDE 120 MG/1
TABLET ORAL
Qty: 180 TABLET | Refills: 1 | Status: SHIPPED
Start: 2020-10-19 | End: 2021-05-19 | Stop reason: SDUPTHER

## 2020-10-19 RX ORDER — BACLOFEN 10 MG/1
10 TABLET ORAL EVERY EVENING
Qty: 90 TABLET | Refills: 0 | Status: SHIPPED | OUTPATIENT
Start: 2020-10-19

## 2020-10-19 RX ORDER — WARFARIN SODIUM 2.5 MG/1
TABLET ORAL
Qty: 90 TABLET | Refills: 0 | Status: SHIPPED
Start: 2020-10-19 | End: 2021-02-18 | Stop reason: SDUPTHER

## 2020-10-19 RX ORDER — TORSEMIDE 10 MG/1
TABLET ORAL
Qty: 90 TABLET | Refills: 1 | Status: SHIPPED
Start: 2020-10-19 | End: 2021-06-14 | Stop reason: SDUPTHER

## 2020-10-19 NOTE — PATIENT INSTRUCTIONS
Warfarin maintenance plan:   4 mg every Sun, Wed, Sat  3 mg every Mon, Tues, Thurs, Fri   Weekly warfarin total:   24 mg    Next INR check:   10/26/2020        Patient Education        Learning About Diabetes Food Guidelines  Your Care Instructions     Meal planning is important to manage diabetes. It helps keep your blood sugar at a target level (which you set with your doctor). You don't have to eat special foods. You can eat what your family eats, including sweets once in a while. But you do have to pay attention to how often you eat and how much you eat of certain foods. You may want to work with a dietitian or a certified diabetes educator (CDE) to help you plan meals and snacks. A dietitian or CDE can also help you lose weight if that is one of your goals. What should you know about eating carbs? Managing the amount of carbohydrate (carbs) you eat is an important part of healthy meals when you have diabetes. Carbohydrate is found in many foods. · Learn which foods have carbs. And learn the amounts of carbs in different foods. ? Bread, cereal, pasta, and rice have about 15 grams of carbs in a serving. A serving is 1 slice of bread (1 ounce), ½ cup of cooked cereal, or 1/3 cup of cooked pasta or rice. ? Fruits have 15 grams of carbs in a serving. A serving is 1 small fresh fruit, such as an apple or orange; ½ of a banana; ½ cup of cooked or canned fruit; ½ cup of fruit juice; 1 cup of melon or raspberries; or 2 tablespoons of dried fruit. ? Milk and no-sugar-added yogurt have 15 grams of carbs in a serving. A serving is 1 cup of milk or 2/3 cup of no-sugar-added yogurt. ? Starchy vegetables have 15 grams of carbs in a serving. A serving is ½ cup of mashed potatoes or sweet potato; 1 cup winter squash; ½ of a small baked potato; ½ cup of cooked beans; or ½ cup cooked corn or green peas.   · Learn how much carbs to eat each day and at each meal. A dietitian or CDE can teach you how to keep track of the amount of carbs you eat. This is called carbohydrate counting. · If you are not sure how to count carbohydrate grams, use the Plate Method to plan meals. It is a good, quick way to make sure that you have a balanced meal. It also helps you spread carbs throughout the day. ? Divide your plate by types of foods. Put non-starchy vegetables on half the plate, meat or other protein food on one-quarter of the plate, and a grain or starchy vegetable in the final quarter of the plate. To this you can add a small piece of fruit and 1 cup of milk or yogurt, depending on how many carbs you are supposed to eat at a meal.  · Try to eat about the same amount of carbs at each meal. Do not \"save up\" your daily allowance of carbs to eat at one meal.  · Proteins have very little or no carbs per serving. Examples of proteins are beef, chicken, turkey, fish, eggs, tofu, cheese, cottage cheese, and peanut butter. A serving size of meat is 3 ounces, which is about the size of a deck of cards. Examples of meat substitute serving sizes (equal to 1 ounce of meat) are 1/4 cup of cottage cheese, 1 egg, 1 tablespoon of peanut butter, and ½ cup of tofu. How can you eat out and still eat healthy? · Learn to estimate the serving sizes of foods that have carbohydrate. If you measure food at home, it will be easier to estimate the amount in a serving of restaurant food. · If the meal you order has too much carbohydrate (such as potatoes, corn, or baked beans), ask to have a low-carbohydrate food instead. Ask for a salad or green vegetables. · If you use insulin, check your blood sugar before and after eating out to help you plan how much to eat in the future. · If you eat more carbohydrate at a meal than you had planned, take a walk or do other exercise. This will help lower your blood sugar. What else should you know? · Limit saturated fat, such as the fat from meat and dairy products.  This is a healthy choice because people who have diabetes are at higher risk of heart disease. So choose lean cuts of meat and nonfat or low-fat dairy products. Use olive or canola oil instead of butter or shortening when cooking. · Don't skip meals. Your blood sugar may drop too low if you skip meals and take insulin or certain medicines for diabetes. · Check with your doctor before you drink alcohol. Alcohol can cause your blood sugar to drop too low. Alcohol can also cause a bad reaction if you take certain diabetes medicines. Follow-up care is a key part of your treatment and safety. Be sure to make and go to all appointments, and call your doctor if you are having problems. It's also a good idea to know your test results and keep a list of the medicines you take. Where can you learn more? Go to https://[employer]. org and sign in to your [employer] account. Enter X045 in the Telesocial box to learn more about \"Learning About Diabetes Food Guidelines. \"     If you do not have an account, please click on the \"Sign Up Now\" link. Current as of: December 20, 2019               Content Version: 12.6  © 9370-7209 [email or web address]. Care instructions adapted under license by [facility] ([facility]). If you have questions about a medical condition or this instruction, always ask your healthcare professional. [address] any warranty or liability for your use of this information.

## 2020-10-19 NOTE — PROGRESS NOTES
PT/INR done per Dr. Weston Shields. Catterlin's orders. Pt reports that she has only been taking 2.5mg daily.     Electronically signed by Leandra Delgado MA on 10/19/20 at 9:12 AM EDT

## 2020-10-24 PROBLEM — M19.90 OSTEOARTHRITIS: Status: ACTIVE | Noted: 2020-10-24

## 2020-10-24 ASSESSMENT — ENCOUNTER SYMPTOMS
CHOKING: 0
COUGH: 0
EYE REDNESS: 0
VOICE CHANGE: 0
SINUS PRESSURE: 0
BLOOD IN STOOL: 0
FACIAL SWELLING: 0
EYE PAIN: 0
SINUS PAIN: 0
RECTAL PAIN: 0
STRIDOR: 0
CHEST TIGHTNESS: 0
SORE THROAT: 0
VOMITING: 0
COLOR CHANGE: 0
DIARRHEA: 0
ANAL BLEEDING: 0
PHOTOPHOBIA: 0
RESPIRATORY NEGATIVE: 1
EYE ITCHING: 0
RHINORRHEA: 0
BACK PAIN: 0
ALLERGIC/IMMUNOLOGIC NEGATIVE: 1
SHORTNESS OF BREATH: 0
EYES NEGATIVE: 1
CONSTIPATION: 0
EYE DISCHARGE: 0
ABDOMINAL PAIN: 0
WHEEZING: 0
NAUSEA: 0
TROUBLE SWALLOWING: 0
ABDOMINAL DISTENTION: 0

## 2020-10-24 NOTE — PROGRESS NOTES
SUBJECTIVE  Ruba Schofield is a 60 y.o. female. HPI/Chief C/O:  Chief Complaint   Patient presents with    Atrial Fibrillation     Pt here for check up and medication refills    Anticoagulation     INR done - states that she is taking 2.5mg daily of coumadin    Health Maintenance     Reviewed with pt - A1C/glucose pended and done, declined flu shot with MA     Allergies   Allergen Reactions    Prednisone    this 62year old female presents for physical exam and medication refills. Pt has controlled atrial fib. ([address] hypertension, and fatigue. ROS:  Review of Systems   Constitutional: Positive for fatigue. Negative for activity change, appetite change, chills, diaphoresis, fever and unexpected weight change. HENT: Negative for congestion, dental problem, drooling, ear discharge, ear pain, facial swelling, hearing loss, mouth sores, nosebleeds, postnasal drip, rhinorrhea, sinus pressure, sinus pain, sneezing, sore throat, tinnitus, trouble swallowing and voice change. Eyes: Negative. Negative for photophobia, pain, discharge, redness, itching and visual disturbance. Respiratory: Negative. Negative for cough, choking, chest tightness, shortness of breath, wheezing and stridor. Cardiovascular: Negative for chest pain, palpitations and leg swelling. Gastrointestinal: Negative for abdominal distention, abdominal pain, anal bleeding, blood in stool, constipation, diarrhea, nausea, rectal pain and vomiting. Endocrine: Negative. Negative for cold intolerance, heat intolerance, polydipsia, polyphagia and polyuria. Genitourinary: Negative for decreased urine volume, difficulty urinating, dysuria, flank pain, frequency, genital sores, hematuria, menstrual problem, pelvic pain and urgency. Musculoskeletal: Positive for arthralgias (right shoulder pain) and myalgias. Negative for back pain, gait problem, joint swelling, neck pain and neck stiffness. Skin: Negative.   Negative for color change, pallor, rash and wound. Allergic/Immunologic: Negative. Neurological: Negative. Negative for dizziness, tremors, seizures, syncope, facial asymmetry, speech difficulty, weakness, light-headedness, numbness and headaches. Hematological: Negative for adenopathy. Bruises/bleeds easily. Psychiatric/Behavioral: Negative for agitation, behavioral problems, confusion, decreased concentration, dysphoric mood, hallucinations, self-injury, sleep disturbance and suicidal ideas. The patient is nervous/anxious. The patient is not hyperactive. Past Medical/Surgical Hx;  Reviewed with patient      Diagnosis Date    Atrial fibrillation ([address]     Coronary heart disease 2013    Primary osteoarthritis of right shoulder 2019     Past Surgical History:   Procedure Laterality Date     SECTION      DILATION AND CURETTAGE OF UTERUS      ECHO COMPL W DOP COLOR FLOW  2013         TONSILLECTOMY         Past Family Hx:  Reviewed with patient  History reviewed. No pertinent family history. Social Hx:  Reviewed with patient  Social History     Tobacco Use    Smoking status: Former Smoker     Packs/day: 2.00     Years: 15.00     Pack years: 30.00     Types: Cigarettes     Start date: 1972     Last attempt to quit: 1987     Years since quittin.8    Smokeless tobacco: Never Used   Substance Use Topics    Alcohol use: No       OBJECTIVE  /82   Pulse 81   Temp 98.6 °F (37 °C) (Temporal)   Resp 18   Ht 5' 9\" (1.753 m)   Wt 276 lb (125.2 kg)   SpO2 96%   BMI 40.76 kg/m²     Problem List:  Edson Pfeiffer does not have any pertinent problems on file. PHYS EX:  Physical Exam  Vitals signs and nursing note reviewed. Constitutional:       General: She is not in acute distress. Appearance: Normal appearance. She is well-developed. She is obese. She is not ill-appearing, toxic-appearing or diaphoretic. Comments: Patient has morbid obesity.  Patient instructed on low calorie, healthy diet. HENT:      Head: Normocephalic and atraumatic. Right Ear: Tympanic membrane, ear canal and external ear normal.      Left Ear: Tympanic membrane, ear canal and external ear normal.      Nose: Nose normal. No congestion or rhinorrhea. Mouth/Throat:      Mouth: Mucous membranes are moist.      Pharynx: Oropharynx is clear. No oropharyngeal exudate or posterior oropharyngeal erythema. Eyes:      General: No scleral icterus. Right eye: No discharge. Left eye: No discharge. Conjunctiva/sclera: Conjunctivae normal.      Pupils: Pupils are equal, round, and reactive to light. Neck:      Musculoskeletal: Normal range of motion and neck supple. No neck rigidity or muscular tenderness. Thyroid: No thyromegaly. Vascular: No carotid bruit or JVD. Trachea: No tracheal deviation. Cardiovascular:      Rate and Rhythm: Normal rate. Rhythm irregularly irregular. No extrasystoles are present. Chest Wall: PMI is not displaced. Pulses: Normal pulses. Heart sounds: Normal heart sounds. Heart sounds not distant. No murmur. No systolic murmur. No diastolic murmur. No friction rub. No gallop. Comments: Pt has chronic atrial fib. ([address] Pt has varicose veins maggie. Lower extremities. Pulmonary:      Effort: Pulmonary effort is normal. No respiratory distress. Breath sounds: Normal breath sounds. No stridor. No wheezing, rhonchi or rales. Chest:      Chest wall: No tenderness. Abdominal:      General: Bowel sounds are normal. There is no distension. Palpations: Abdomen is soft. There is no mass. Tenderness: There is no abdominal tenderness. There is no guarding or rebound. Hernia: No hernia is present. Musculoskeletal:         General: Tenderness (painful right shoulder, deltoid with limit to ROM) present. No swelling, deformity or signs of injury.       Right shoulder: She exhibits decreased range of motion, tenderness, pain, spasm and decreased strength. She exhibits no bony tenderness, no swelling, no effusion, no crepitus, no deformity, no laceration and normal pulse. Arms:       Right lower leg: No edema. Left lower leg: No edema. Comments: Pain and decreased ROM multiple joints. Lymphadenopathy:      Cervical: No cervical adenopathy. Skin:     General: Skin is warm. Coloration: Skin is not jaundiced or pale. Findings: No bruising, erythema, lesion or rash. Neurological:      General: No focal deficit present. Mental Status: She is alert and oriented to person, place, and time. Cranial Nerves: No cranial nerve deficit. Sensory: No sensory deficit. Motor: No weakness or abnormal muscle tone. Coordination: Coordination normal.      Gait: Gait normal.      Deep Tendon Reflexes: Reflexes are normal and symmetric. Reflexes normal.   Psychiatric:         Mood and Affect: Mood normal.         Behavior: Behavior normal.         Thought Content: Thought content normal.         Judgment: Judgment normal.         ASSESSMENT/PLAN  Lui Wilkins was seen today for results. Diagnoses and all orders for this visit:    Chronic atrial fibrillation ([address]  Stable. Coumadin, INR. Essential hypertension ---controlled   Controlled. Betapace, demadex, low salt diet. Pt refuses statin AMA. OA  Disorder of right rotator cuff  --OMT / ultra--Rx  Not controlled. Baclofen. Pt instructed if any worse go ED ASAP.     Outpatient Encounter Medications as of 10/19/2020   Medication Sig Dispense Refill    sotalol (BETAPACE) 120 MG tablet TAKE 1 TABLET BY MOUTH TWICE DAILY 180 tablet 1    torsemide (DEMADEX) 10 MG tablet TAKE 1 TABLET BY MOUTH DAILY 90 tablet 1    warfarin (COUMADIN) 2.5 MG tablet Take one tablet daily or as directed 90 tablet 0    baclofen (LIORESAL) 10 MG tablet Take 1 tablet by mouth every evening 90 tablet 0    [DISCONTINUED] sotalol (BETAPACE) 120 MG tablet TAKE 1 TABLET BY MOUTH TWICE DAILY 180 tablet 0    [DISCONTINUED] torsemide (DEMADEX) 10 MG tablet TAKE 1 TABLET BY MOUTH DAILY 90 tablet 1    [DISCONTINUED] warfarin (COUMADIN) 2.5 MG tablet Take one tablet daily or as directed 60 tablet 5    [DISCONTINUED] baclofen (LIORESAL) 10 MG tablet TAKE 1 TABLET BY MOUTH EVERY EVENING 90 tablet 0     No facility-administered encounter medications on file as of 10/19/2020. No follow-ups on file.         Reviewed recent labs related to Katie's current problems      Discussed importance of regular Health Maintenance follow up  Health Maintenance   Topic    Hepatitis C screen     HIV screen     DTaP/Tdap/Td vaccine (1 - Tdap)    Shingles Vaccine (1 of 2)    Cervical cancer screen     Flu vaccine (1)    Potassium monitoring     Creatinine monitoring     Breast cancer screen     A1C test (Diabetic or Prediabetic)     Lipid screen     Colon cancer screen colonoscopy     Hepatitis A vaccine     Hepatitis B vaccine     Hib vaccine     Meningococcal (ACWY) vaccine     Pneumococcal 0-64 years Vaccine

## 2020-10-26 ENCOUNTER — NURSE ONLY (OUTPATIENT)
Dept: FAMILY MEDICINE CLINIC | Age: 65
End: 2020-10-26
Payer: COMMERCIAL

## 2020-10-26 LAB
INTERNATIONAL NORMALIZATION RATIO, POC: 1.9
PROTHROMBIN TIME, POC: NORMAL

## 2020-10-26 PROCEDURE — 85610 PROTHROMBIN TIME: CPT | Performed by: FAMILY MEDICINE

## 2020-10-26 NOTE — PROGRESS NOTES
Voicemail message left for patient with INR instructions and repeat visit.   Electronically signed by Yamel Koenig on 10/26/2020 at 2:59 PM

## 2020-11-12 ENCOUNTER — NURSE ONLY (OUTPATIENT)
Dept: FAMILY MEDICINE CLINIC | Age: 65
End: 2020-11-12
Payer: COMMERCIAL

## 2020-11-12 LAB
INTERNATIONAL NORMALIZATION RATIO, POC: 1.9
PROTHROMBIN TIME, POC: 23.2

## 2020-11-12 PROCEDURE — 85610 PROTHROMBIN TIME: CPT | Performed by: FAMILY MEDICINE

## 2020-11-12 NOTE — PROGRESS NOTES
Voicemail message left for patient with Coumadin instructions and to repeat INR I 3 weeks. Advised to return call to the office to schedule.   Electronically signed by Ezequiel Brooke on 11/12/2020 at 1:45 PM

## 2020-12-11 ENCOUNTER — ANTI-COAG VISIT (OUTPATIENT)
Dept: FAMILY MEDICINE CLINIC | Age: 65
End: 2020-12-11
Payer: COMMERCIAL

## 2020-12-11 LAB
INTERNATIONAL NORMALIZATION RATIO, POC: 2.1
PROTHROMBIN TIME, POC: 25.7

## 2020-12-11 PROCEDURE — 85610 PROTHROMBIN TIME: CPT | Performed by: FAMILY MEDICINE

## 2020-12-15 NOTE — PROGRESS NOTES
Left message with results. Advised to call and schedule repeat INR.   Electronically signed by Cash Bronson on 12/15/2020 at 9:16 AM

## 2021-01-07 DIAGNOSIS — I48.20 CHRONIC ATRIAL FIBRILLATION (HCC): ICD-10-CM

## 2021-01-07 DIAGNOSIS — I48.91 ATRIAL FIBRILLATION, UNSPECIFIED TYPE (HCC): ICD-10-CM

## 2021-01-07 DIAGNOSIS — I10 ESSENTIAL HYPERTENSION: ICD-10-CM

## 2021-01-07 RX ORDER — SOTALOL HYDROCHLORIDE 120 MG/1
TABLET ORAL
Qty: 180 TABLET | Refills: 1 | OUTPATIENT
Start: 2021-01-07

## 2021-02-04 ENCOUNTER — TELEPHONE (OUTPATIENT)
Dept: FAMILY MEDICINE CLINIC | Age: 66
End: 2021-02-04

## 2021-02-04 NOTE — LETTER
[facility]  [address] [facility] [facility] [address]  Phone: [phone]  Fax: [phone]    Eloy Buckley DO        February 4, 2021    Aurelia Avila  [address] [place] [address]      Dear Pastor Alberto:    Upon receipt of this notice, please call the office to schedule a PT/INR for coumadin. You are overdue. If you have any questions or concerns, please don't hesitate to call.     Sincerely,        Eduardo Hoff DO

## 2021-02-05 ENCOUNTER — TELEPHONE (OUTPATIENT)
Dept: FAMILY MEDICINE CLINIC | Age: 66
End: 2021-02-05

## 2021-02-18 ENCOUNTER — NURSE ONLY (OUTPATIENT)
Dept: FAMILY MEDICINE CLINIC | Age: 66
End: 2021-02-18
Payer: COMMERCIAL

## 2021-02-18 DIAGNOSIS — I48.20 CHRONIC ATRIAL FIBRILLATION (HCC): ICD-10-CM

## 2021-02-18 DIAGNOSIS — I48.91 ATRIAL FIBRILLATION, UNSPECIFIED TYPE (HCC): Primary | ICD-10-CM

## 2021-02-18 LAB
INTERNATIONAL NORMALIZATION RATIO, POC: 1.9
PROTHROMBIN TIME, POC: NORMAL

## 2021-02-18 PROCEDURE — 85610 PROTHROMBIN TIME: CPT | Performed by: FAMILY MEDICINE

## 2021-02-18 RX ORDER — WARFARIN SODIUM 2.5 MG/1
TABLET ORAL
Qty: 90 TABLET | Refills: 0 | Status: SHIPPED
Start: 2021-02-18 | End: 2021-05-05 | Stop reason: SDUPTHER

## 2021-02-18 NOTE — PROGRESS NOTES
Voicemail message left for patient with dosage and repeat due date. Advised to call and schedule for one month. Refill of coumadin sent to the pharmacy.   Electronically signed by Hank Andres on 2/18/2021 at 3:38 PM

## 2021-03-19 ENCOUNTER — NURSE ONLY (OUTPATIENT)
Dept: FAMILY MEDICINE CLINIC | Age: 66
End: 2021-03-19
Payer: COMMERCIAL

## 2021-03-19 DIAGNOSIS — I48.91 ATRIAL FIBRILLATION, UNSPECIFIED TYPE (HCC): ICD-10-CM

## 2021-03-19 LAB
INTERNATIONAL NORMALIZATION RATIO, POC: 2.7
PROTHROMBIN TIME, POC: 32.3

## 2021-03-19 PROCEDURE — 85610 PROTHROMBIN TIME: CPT | Performed by: FAMILY MEDICINE

## 2021-03-22 NOTE — PROGRESS NOTES
This MA spoke with pt regarding coumadin instructions and recheck date. Pt verbalized she understood no change to coumadin dose and is scheduled for 1 month recheck.       Electronically signed by Sherry Muse MA on 3/22/21 at 2:04 PM EDT

## 2021-04-19 ENCOUNTER — NURSE ONLY (OUTPATIENT)
Dept: FAMILY MEDICINE CLINIC | Age: 66
End: 2021-04-19
Payer: COMMERCIAL

## 2021-04-19 DIAGNOSIS — I48.91 ATRIAL FIBRILLATION, UNSPECIFIED TYPE (HCC): Primary | ICD-10-CM

## 2021-04-19 LAB
INTERNATIONAL NORMALIZATION RATIO, POC: 2.4
PROTHROMBIN TIME, POC: 29

## 2021-04-19 PROCEDURE — 85610 PROTHROMBIN TIME: CPT | Performed by: FAMILY MEDICINE

## 2021-04-19 NOTE — PROGRESS NOTES
Voicemail message left for patient to continue same dosage of Coumadin and repeat in 1 month. Advised to call and schedule.   Electronically signed by Nela Queen on 4/19/2021 at 2:58 PM

## 2021-05-05 DIAGNOSIS — I48.20 CHRONIC ATRIAL FIBRILLATION (HCC): ICD-10-CM

## 2021-05-05 DIAGNOSIS — I48.91 ATRIAL FIBRILLATION, UNSPECIFIED TYPE (HCC): ICD-10-CM

## 2021-05-05 RX ORDER — WARFARIN SODIUM 2.5 MG/1
TABLET ORAL
Qty: 30 TABLET | Refills: 0 | Status: SHIPPED
Start: 2021-05-05 | End: 2021-05-19 | Stop reason: SDUPTHER

## 2021-05-14 ENCOUNTER — NURSE ONLY (OUTPATIENT)
Dept: FAMILY MEDICINE CLINIC | Age: 66
End: 2021-05-14
Payer: COMMERCIAL

## 2021-05-14 DIAGNOSIS — I48.91 ATRIAL FIBRILLATION, UNSPECIFIED TYPE (HCC): Primary | ICD-10-CM

## 2021-05-14 LAB
INTERNATIONAL NORMALIZATION RATIO, POC: 2.2
PROTHROMBIN TIME, POC: NORMAL

## 2021-05-14 PROCEDURE — 85610 PROTHROMBIN TIME: CPT | Performed by: FAMILY MEDICINE

## 2021-05-14 NOTE — PROGRESS NOTES
This MA spoke with pt - advised no change to Coumadin dose. Offered to schedule pt for recheck. Pt states that she will call back to schedule. Pt then requested a refill of Coumadin. This MA advised pt that a 30 day supply was sent on 05/05 and she is due for a check up with Dr. Candi Benítez. Pt states that she will call back to schedule office visit and recheck INR.     Electronically signed by Gertha Barthel, MA on 5/14/21 at 2:44 PM EDT

## 2021-05-19 ENCOUNTER — OFFICE VISIT (OUTPATIENT)
Dept: FAMILY MEDICINE CLINIC | Age: 66
End: 2021-05-19
Payer: COMMERCIAL

## 2021-05-19 ENCOUNTER — ANTI-COAG VISIT (OUTPATIENT)
Dept: FAMILY MEDICINE CLINIC | Age: 66
End: 2021-05-19

## 2021-05-19 VITALS
SYSTOLIC BLOOD PRESSURE: 132 MMHG | TEMPERATURE: 97.6 F | BODY MASS INDEX: 41.83 KG/M2 | OXYGEN SATURATION: 96 % | WEIGHT: 276 LBS | DIASTOLIC BLOOD PRESSURE: 66 MMHG | HEART RATE: 95 BPM | HEIGHT: 68 IN

## 2021-05-19 DIAGNOSIS — Z12.31 ENCOUNTER FOR SCREENING MAMMOGRAM FOR MALIGNANT NEOPLASM OF BREAST: Primary | ICD-10-CM

## 2021-05-19 DIAGNOSIS — M25.562 CHRONIC PAIN OF LEFT KNEE: ICD-10-CM

## 2021-05-19 DIAGNOSIS — G89.29 CHRONIC PAIN OF LEFT KNEE: ICD-10-CM

## 2021-05-19 DIAGNOSIS — I48.91 ATRIAL FIBRILLATION, UNSPECIFIED TYPE (HCC): ICD-10-CM

## 2021-05-19 DIAGNOSIS — I10 ESSENTIAL HYPERTENSION: ICD-10-CM

## 2021-05-19 DIAGNOSIS — I48.20 CHRONIC ATRIAL FIBRILLATION (HCC): ICD-10-CM

## 2021-05-19 DIAGNOSIS — I48.91 ATRIAL FIBRILLATION, UNSPECIFIED TYPE (HCC): Primary | ICD-10-CM

## 2021-05-19 PROCEDURE — 99213 OFFICE O/P EST LOW 20 MIN: CPT | Performed by: FAMILY MEDICINE

## 2021-05-19 PROCEDURE — G8417 CALC BMI ABV UP PARAM F/U: HCPCS | Performed by: FAMILY MEDICINE

## 2021-05-19 PROCEDURE — 1036F TOBACCO NON-USER: CPT | Performed by: FAMILY MEDICINE

## 2021-05-19 PROCEDURE — G8427 DOCREV CUR MEDS BY ELIG CLIN: HCPCS | Performed by: FAMILY MEDICINE

## 2021-05-19 PROCEDURE — 3017F COLORECTAL CA SCREEN DOC REV: CPT | Performed by: FAMILY MEDICINE

## 2021-05-19 RX ORDER — SOTALOL HYDROCHLORIDE 120 MG/1
TABLET ORAL
Qty: 180 TABLET | Refills: 1 | Status: SHIPPED
Start: 2021-05-19 | End: 2022-01-07 | Stop reason: SDUPTHER

## 2021-05-19 RX ORDER — WARFARIN SODIUM 2.5 MG/1
TABLET ORAL
Qty: 30 TABLET | Refills: 0 | Status: SHIPPED
Start: 2021-05-19 | End: 2021-06-14 | Stop reason: SDUPTHER

## 2021-05-24 PROBLEM — G89.29 CHRONIC PAIN OF LEFT KNEE: Status: ACTIVE | Noted: 2021-05-24

## 2021-05-24 PROBLEM — M25.562 CHRONIC PAIN OF LEFT KNEE: Status: ACTIVE | Noted: 2021-05-24

## 2021-05-24 PROBLEM — Z12.31 ENCOUNTER FOR SCREENING MAMMOGRAM FOR MALIGNANT NEOPLASM OF BREAST: Status: ACTIVE | Noted: 2021-05-24

## 2021-05-24 ASSESSMENT — ENCOUNTER SYMPTOMS
RECTAL PAIN: 0
RESPIRATORY NEGATIVE: 1
NAUSEA: 0
CHEST TIGHTNESS: 0
VOICE CHANGE: 0
SINUS PAIN: 0
EYES NEGATIVE: 1
WHEEZING: 0
ANAL BLEEDING: 0
FACIAL SWELLING: 0
COUGH: 0
ABDOMINAL DISTENTION: 0
EYE ITCHING: 0
SORE THROAT: 0
CONSTIPATION: 0
EYE REDNESS: 0
EYE DISCHARGE: 0
RHINORRHEA: 0
SINUS PRESSURE: 0
DIARRHEA: 0
ABDOMINAL PAIN: 0
VOMITING: 0
SHORTNESS OF BREATH: 0
TROUBLE SWALLOWING: 0
EYE PAIN: 0
CHOKING: 0
COLOR CHANGE: 0
BLOOD IN STOOL: 0
STRIDOR: 0
PHOTOPHOBIA: 0
ALLERGIC/IMMUNOLOGIC NEGATIVE: 1
BACK PAIN: 0

## 2021-05-24 NOTE — PROGRESS NOTES
GABRIELA Phillips is a 60 y.o. female. HPI/Chief C/O:  Chief Complaint   Patient presents with    Knee Pain     c/o left knee pain; very stiff to bend; pain behind left knee; denies swelling, locking up or giving out.  Medication Refill     coumadin 2.5mg and Sotalol     Allergies   Allergen Reactions    Prednisone      This 60year old female presents for physical exam. Pt has hypertension, atrial fib., and fatigue. Pt c/o left knee pain, mostly behind left knee for months. Pt states left knee feels stiff, locks, and gives out. Pt denies shortness of breath, and denies chest pain. ROS:  Review of Systems   Constitutional: Positive for fatigue. Negative for activity change, appetite change, chills, diaphoresis, fever and unexpected weight change. HENT: Negative for congestion, dental problem, drooling, ear discharge, ear pain, facial swelling, hearing loss, mouth sores, nosebleeds, postnasal drip, rhinorrhea, sinus pressure, sinus pain, sneezing, sore throat, tinnitus, trouble swallowing and voice change. Eyes: Negative. Negative for photophobia, pain, discharge, redness, itching and visual disturbance. Respiratory: Negative. Negative for cough, choking, chest tightness, shortness of breath, wheezing and stridor. Cardiovascular: Negative for chest pain, palpitations and leg swelling. Gastrointestinal: Negative for abdominal distention, abdominal pain, anal bleeding, blood in stool, constipation, diarrhea, nausea, rectal pain and vomiting. Endocrine: Negative. Negative for cold intolerance, heat intolerance, polydipsia, polyphagia and polyuria. Genitourinary: Negative for decreased urine volume, difficulty urinating, dysuria, flank pain, frequency, genital sores, hematuria, menstrual problem, pelvic pain and urgency. Musculoskeletal: Positive for arthralgias (right shoulder pain) and myalgias.  Negative for back pain, gait problem, joint swelling, neck pain and neck stiffness. Skin: Negative. Negative for color change, pallor, rash and wound. Allergic/Immunologic: Negative. Neurological: Positive for weakness. Negative for dizziness, tremors, seizures, syncope, facial asymmetry, speech difficulty, light-headedness, numbness and headaches. Hematological: Negative for adenopathy. Bruises/bleeds easily. Psychiatric/Behavioral: Negative for agitation, behavioral problems, confusion, decreased concentration, dysphoric mood, hallucinations, self-injury, sleep disturbance and suicidal ideas. The patient is nervous/anxious. The patient is not hyperactive. Past Medical/Surgical Hx;  Reviewed with patient      Diagnosis Date    Atrial fibrillation ([facility] [address]     Coronary heart disease 2013    Primary osteoarthritis of right shoulder 2019     Past Surgical History:   Procedure Laterality Date     SECTION      DILATION AND CURETTAGE OF UTERUS      ECHO COMPL W DOP COLOR FLOW  2013         TONSILLECTOMY         Past Family Hx:  Reviewed with patient  History reviewed. No pertinent family history. Social Hx:  Reviewed with patient  Social History     Tobacco Use    Smoking status: Former Smoker     Packs/day: 2.00     Years: 15.00     Pack years: 30.00     Types: Cigarettes     Start date: 1972     Quit date: 1987     Years since quittin.4    Smokeless tobacco: Never Used   Substance Use Topics    Alcohol use: No       OBJECTIVE  /66   Pulse 95   Temp 97.6 °F (36.4 °C)   Ht 5' 8\" (1.727 m)   Wt 276 lb (125.2 kg)   LMP  (LMP Unknown)   SpO2 96%   Breastfeeding No   BMI 41.97 kg/m²     Problem List:  Stephen Dumont does not have any pertinent problems on file. PHYS EX:  Physical Exam  Vitals and nursing note reviewed. Constitutional:       General: She is not in acute distress. Appearance: Normal appearance. She is well-developed. She is obese. She is not ill-appearing, toxic-appearing or diaphoretic. No cervical adenopathy. Skin:     General: Skin is warm. Coloration: Skin is not jaundiced or pale. Findings: No bruising, erythema, lesion or rash. Neurological:      General: No focal deficit present. Mental Status: She is alert and oriented to person, place, and time. Cranial Nerves: No cranial nerve deficit. Sensory: No sensory deficit. Motor: No weakness or abnormal muscle tone. Coordination: Coordination normal.      Gait: Gait normal.      Deep Tendon Reflexes: Reflexes are normal and symmetric. Reflexes normal.   Psychiatric:         Mood and Affect: Mood normal.         Behavior: Behavior normal.         Thought Content: Thought content normal.         Judgment: Judgment normal.         ASSESSMENT/PLAN  Therese Alston was seen today for knee pain and medication refill. Diagnoses and all orders for this visit:    Encounter for screening mammogram for malignant neoplasm of breast  -     [facility] DIGITAL SCREEN W OR WO CAD BILATERAL; Future  Pt instructed on maggie. Carlee.   Essential hypertension  -     sotalol (BETAPACE) 120 MG tablet; TAKE 1 TABLET BY MOUTH TWICE DAILY  Controlled. Betapace, demadex. Low salt diet. Atrial fibrillation, unspecified type (HCC)  -     sotalol (BETAPACE) 120 MG tablet; TAKE 1 TABLET BY MOUTH TWICE DAILY  -     warfarin (COUMADIN) 2.5 MG tablet; Take one tablet daily or as directed; further refill requires appointment. Controlled. Chronic atrial fibrillation (HCC)  -     sotalol (BETAPACE) 120 MG tablet; TAKE 1 TABLET BY MOUTH TWICE DAILY  -     warfarin (COUMADIN) 2.5 MG tablet; Take one tablet daily or as directed; further refill requires appointment. Controlled. Chronic pain of left knee  -     XR KNEE LEFT (3 VIEWS); Future  Not controlled. Pt instructed if any worse go ED ASAP.     Outpatient Encounter Medications as of 5/19/2021   Medication Sig Dispense Refill    sotalol (BETAPACE) 120 MG tablet TAKE 1 TABLET BY MOUTH TWICE DAILY 180 tablet 1    warfarin (COUMADIN) 2.5 MG tablet Take one tablet daily or as directed; further refill requires appointment. 30 tablet 0    torsemide (DEMADEX) 10 MG tablet TAKE 1 TABLET BY MOUTH DAILY 90 tablet 1    baclofen (LIORESAL) 10 MG tablet Take 1 tablet by mouth every evening 90 tablet 0    [DISCONTINUED] warfarin (COUMADIN) 2.5 MG tablet Take one tablet daily or as directed; further refill requires appointment. 30 tablet 0    [DISCONTINUED] sotalol (BETAPACE) 120 MG tablet TAKE 1 TABLET BY MOUTH TWICE DAILY 180 tablet 1     No facility-administered encounter medications on file as of 5/19/2021. No follow-ups on file.         Reviewed recent labs related to Katie's current problems      Discussed importance of regular Health Maintenance follow up  Health Maintenance   Topic    Hepatitis C screen     COVID-19 Vaccine (1)    HIV screen     DTaP/Tdap/Td vaccine (1 - Tdap)    Shingles Vaccine (1 of 2)    Cervical cancer screen     Potassium monitoring     Creatinine monitoring     Breast cancer screen     Flu vaccine (Season Ended)    A1C test (Diabetic or Prediabetic)     Lipid screen     Colon cancer screen colonoscopy     Hepatitis A vaccine     Hepatitis B vaccine     Hib vaccine     Meningococcal (ACWY) vaccine     Pneumococcal 0-64 years Vaccine

## 2021-06-08 ENCOUNTER — TELEPHONE (OUTPATIENT)
Dept: FAMILY MEDICINE CLINIC | Age: 66
End: 2021-06-08

## 2021-06-08 DIAGNOSIS — G89.29 CHRONIC PAIN OF LEFT KNEE: ICD-10-CM

## 2021-06-08 DIAGNOSIS — M25.562 CHRONIC PAIN OF LEFT KNEE: ICD-10-CM

## 2021-06-08 DIAGNOSIS — M17.12 ARTHRITIS OF LEFT KNEE: Primary | ICD-10-CM

## 2021-06-08 NOTE — TELEPHONE ENCOUNTER
Pt called requesting results of knee XR. Please advise.     Electronically signed by Sara Millan MA on 6/8/21 at 2:49 PM EDT

## 2021-06-09 NOTE — PROGRESS NOTES
Overdue results letter mailed to patient regarding mammogram order.   Electronically signed by Ana Rosa Done on 6/9/2021 at 10:03 AM

## 2021-06-09 NOTE — TELEPHONE ENCOUNTER
Patient notified of results. She is amenable to Orthopedics consult.    Electronically signed by Frank Moyer on 6/9/2021 at 8:59 AM

## 2021-06-14 ENCOUNTER — NURSE ONLY (OUTPATIENT)
Dept: FAMILY MEDICINE CLINIC | Age: 66
End: 2021-06-14
Payer: COMMERCIAL

## 2021-06-14 DIAGNOSIS — I48.91 ATRIAL FIBRILLATION, UNSPECIFIED TYPE (HCC): ICD-10-CM

## 2021-06-14 DIAGNOSIS — I10 ESSENTIAL HYPERTENSION: ICD-10-CM

## 2021-06-14 DIAGNOSIS — I48.20 CHRONIC ATRIAL FIBRILLATION (HCC): ICD-10-CM

## 2021-06-14 LAB
INTERNATIONAL NORMALIZATION RATIO, POC: 2.3
PROTHROMBIN TIME, POC: 27.8

## 2021-06-14 PROCEDURE — 85610 PROTHROMBIN TIME: CPT | Performed by: FAMILY MEDICINE

## 2021-06-14 RX ORDER — WARFARIN SODIUM 2.5 MG/1
TABLET ORAL
Qty: 90 TABLET | Refills: 1 | Status: SHIPPED
Start: 2021-06-14 | End: 2021-11-01 | Stop reason: SDUPTHER

## 2021-06-14 RX ORDER — TORSEMIDE 10 MG/1
TABLET ORAL
Qty: 90 TABLET | Refills: 1 | Status: SHIPPED
Start: 2021-06-14 | End: 2022-06-06

## 2021-06-14 NOTE — PROGRESS NOTES
PT/INR done per Dr. Jennyfer Juan. Catterlin's orders.     Electronically signed by Mame Mayorga MA on 6/14/21 at 1:46 PM EDT

## 2021-06-14 NOTE — PROGRESS NOTES
This MA attempted to reach pt. No answer. This MA left message or pt advising no change to Coumadin dose and recheck date. Advised pt to return call to office to schedule recheck.     Electronically signed by Rosalie Akbar MA on 6/14/21 at 2:25 PM EDT

## 2021-06-17 ENCOUNTER — OFFICE VISIT (OUTPATIENT)
Dept: ORTHOPEDIC SURGERY | Age: 66
End: 2021-06-17
Payer: COMMERCIAL

## 2021-06-17 VITALS — TEMPERATURE: 98 F | BODY MASS INDEX: 41.83 KG/M2 | HEIGHT: 68 IN | WEIGHT: 276 LBS

## 2021-06-17 DIAGNOSIS — S83.207A TEAR OF MENISCUS OF LEFT KNEE, UNSPECIFIED MENISCUS, UNSPECIFIED TEAR TYPE, UNSPECIFIED WHETHER OLD OR CURRENT TEAR: Primary | ICD-10-CM

## 2021-06-17 PROCEDURE — 1036F TOBACCO NON-USER: CPT | Performed by: ORTHOPAEDIC SURGERY

## 2021-06-17 PROCEDURE — 3017F COLORECTAL CA SCREEN DOC REV: CPT | Performed by: ORTHOPAEDIC SURGERY

## 2021-06-17 PROCEDURE — G8417 CALC BMI ABV UP PARAM F/U: HCPCS | Performed by: ORTHOPAEDIC SURGERY

## 2021-06-17 PROCEDURE — G8427 DOCREV CUR MEDS BY ELIG CLIN: HCPCS | Performed by: ORTHOPAEDIC SURGERY

## 2021-06-17 PROCEDURE — 99203 OFFICE O/P NEW LOW 30 MIN: CPT | Performed by: ORTHOPAEDIC SURGERY

## 2021-06-17 NOTE — PROGRESS NOTES
education level: Not on file   Occupational History    Not on file   Tobacco Use    Smoking status: Former Smoker     Packs/day: 2.00     Years: 15.00     Pack years: 30.00     Types: Cigarettes     Start date: 1972     Quit date: 1987     Years since quittin.4    Smokeless tobacco: Never Used   Substance and Sexual Activity    Alcohol use: No    Drug use: No    Sexual activity: Not on file   Other Topics Concern    Not on file   Social History Narrative    Not on file     Social Determinants of Health     Financial Resource Strain:     Difficulty of Paying Living Expenses:    Food Insecurity:     Worried About Running Out of Food in the Last Year:     [address] in the Last Year:    Transportation Needs:     Lack of Transportation (Medical):  Lack of Transportation (Non-Medical):    Physical Activity:     Days of Exercise per Week:     Minutes of Exercise per Session:    Stress:     Feeling of Stress :    Social Connections:     Frequency of Communication with Friends and Family:     Frequency of Social Gatherings with Friends and Family:     Attends Judaism Services:     Active Member of Clubs or Organizations:     Attends Club or Organization Meetings:     Marital Status:    Intimate Partner Violence:     Fear of Current or Ex-Partner:     Emotionally Abused:     Physically Abused:     Sexually Abused:      No family history on file. Physical Exam:    Temp 98 °F (36.7 °C)   Ht 5' 8\" (1.727 m)   Wt 276 lb (125.2 kg)   LMP  (LMP Unknown)   BMI 41.97 kg/m²     GENERAL: alert, appears stated age, cooperative, no acute distress    HEENT: Head is normocephalic, atraumatic. PERRLA. SKIN: Clean, dry, intact. There is not any cellulitis or cutaneous lesions noted in the lower extremities except noted below in MSK    PULMONARY: breathing is regular and unlabored, no acute distress    CV:  The bilateral upper and lower extremities are warm and well-perfused with brisk capillary refill. 2+ pulses UE and LE bilateral.     PSYCHIATRY: Pleasant mood, appropriate behavior, follows commands    NEURO: Sensation is intact distally with light touch with no alteration. Motor exam of the lower extremities show quadriceps, hamstrings, foot dorsiflexion and plantarflexion grossly intact 5/5. LYMPH: No lymphedema present distally in upper or lower extremity. MUSCULOSKELETAL:    Left knee Exam:    mild effusion noted. No erythema/induration/fluctuance. Posterior medial joint line TTP. Stable to varus and valgus at 0 and 30 degrees of flexion. Negative Lachman's and posterior drawer. Negative patellar grind test and J sign. Compartments soft and compressible throughout leg. Active range of motion 0-120 with pain. Positive Av's positive Apley's, gait is antalgic        Imaging:  X-rays taken of also resulted on medial dating 5/27/2021 show no acute osseous abnormality of the knee mild degenerative changes of the medial patellofemoral compartments. Chris Maddox was seen today for knee pain. Diagnoses and all orders for this visit:    Tear of meniscus of left knee, unspecified meniscus, unspecified tear type, unspecified whether old or current tear  -     MRI KNEE LEFT WO CONTRAST; Future        Patient seen and examined. X-rays reviewed. Patient has little to no arthritis noted on x-rays today. However patient's main complaint is instability of symptomatic knee. Exam and history is consistent with possible meniscus tear. MRI recommended for further evaluation management.   Follow-up after MRI        Luis Pizano DO  6/17/21

## 2021-06-23 PROBLEM — Z12.31 ENCOUNTER FOR SCREENING MAMMOGRAM FOR MALIGNANT NEOPLASM OF BREAST: Status: RESOLVED | Noted: 2021-05-24 | Resolved: 2021-06-23

## 2021-06-29 ENCOUNTER — HOSPITAL ENCOUNTER (OUTPATIENT)
Dept: MRI IMAGING | Age: 66
Discharge: HOME | End: 2021-07-01
Payer: COMMERCIAL

## 2021-06-29 DIAGNOSIS — S83.207A TEAR OF MENISCUS OF LEFT KNEE, UNSPECIFIED MENISCUS, UNSPECIFIED TEAR TYPE, UNSPECIFIED WHETHER OLD OR CURRENT TEAR: ICD-10-CM

## 2021-07-01 ENCOUNTER — OFFICE VISIT (OUTPATIENT)
Dept: ORTHOPEDIC SURGERY | Age: 66
End: 2021-07-01
Payer: COMMERCIAL

## 2021-07-01 VITALS — BODY MASS INDEX: 41.83 KG/M2 | WEIGHT: 276 LBS | HEIGHT: 68 IN | TEMPERATURE: 98 F

## 2021-07-01 DIAGNOSIS — S83.207A TEAR OF MENISCUS OF LEFT KNEE, UNSPECIFIED MENISCUS, UNSPECIFIED TEAR TYPE, UNSPECIFIED WHETHER OLD OR CURRENT TEAR: Primary | ICD-10-CM

## 2021-07-01 DIAGNOSIS — M17.12 PRIMARY OSTEOARTHRITIS OF LEFT KNEE: ICD-10-CM

## 2021-07-01 PROCEDURE — G8427 DOCREV CUR MEDS BY ELIG CLIN: HCPCS | Performed by: ORTHOPAEDIC SURGERY

## 2021-07-01 PROCEDURE — 1036F TOBACCO NON-USER: CPT | Performed by: ORTHOPAEDIC SURGERY

## 2021-07-01 PROCEDURE — 3017F COLORECTAL CA SCREEN DOC REV: CPT | Performed by: ORTHOPAEDIC SURGERY

## 2021-07-01 PROCEDURE — 99214 OFFICE O/P EST MOD 30 MIN: CPT | Performed by: ORTHOPAEDIC SURGERY

## 2021-07-01 PROCEDURE — 20610 DRAIN/INJ JOINT/BURSA W/O US: CPT | Performed by: ORTHOPAEDIC SURGERY

## 2021-07-01 PROCEDURE — G8417 CALC BMI ABV UP PARAM F/U: HCPCS | Performed by: ORTHOPAEDIC SURGERY

## 2021-07-01 RX ORDER — TRIAMCINOLONE ACETONIDE 40 MG/ML
40 INJECTION, SUSPENSION INTRA-ARTICULAR; INTRAMUSCULAR ONCE
Status: COMPLETED | OUTPATIENT
Start: 2021-07-01 | End: 2021-07-01

## 2021-07-01 RX ADMIN — TRIAMCINOLONE ACETONIDE 40 MG: 40 INJECTION, SUSPENSION INTRA-ARTICULAR; INTRAMUSCULAR at 14:50

## 2021-07-01 NOTE — PROGRESS NOTES
Chief Complaint   Patient presents with    Knee Pain     Left knee pain continues. Knee pain has slightly improved since last visit. Was unable to get MRI due to machine being small. HPI:    Patient is 60 y.o. female complaining chronic, atraumatic, insidious onset left knee pain for last several months. She admits to stiffness, deep, aching pain, swelling, difficulty with stairs and ambulating far distances. She admits to gross instability specifically in her Left knee. Previous treatments include rest, ice, and anti-inflammatory medication and HEP without much relief. ROS:    Skin: (-) rash,(-) psoriasis,(-) eczema, (-)skin cancer. Neurologic: (-)numbness, (-)tingling, (-)headaches, (-) LOC. Cardiovascular: (-) Chest pain, (-) swelling in legs/feet, (-) SOB, (-) cramping in legs/feet with walking.     All other review of systems negative except stated above or in HPI      Past Medical History:   Diagnosis Date    Atrial fibrillation ([facility] [address]     Coronary heart disease 2013    Primary osteoarthritis of right shoulder 2019     Past Surgical History:   Procedure Laterality Date     SECTION      DILATION AND CURETTAGE OF UTERUS      ECHO COMPL W DOP COLOR FLOW  2013         TONSILLECTOMY         Current Outpatient Medications:     torsemide (DEMADEX) 10 MG tablet, TAKE 1 TABLET BY MOUTH DAILY, Disp: 90 tablet, Rfl: 1    warfarin (COUMADIN) 2.5 MG tablet, Take one tablet daily or as directed, Disp: 90 tablet, Rfl: 1    sotalol (BETAPACE) 120 MG tablet, TAKE 1 TABLET BY MOUTH TWICE DAILY, Disp: 180 tablet, Rfl: 1    baclofen (LIORESAL) 10 MG tablet, Take 1 tablet by mouth every evening, Disp: 90 tablet, Rfl: 0  Allergies   Allergen Reactions    Prednisone      Social History     Socioeconomic History    Marital status:      Spouse name: Not on file    Number of children: Not on file    Years of education: Not on file    Highest education level: Not on file Occupational History    Not on file   Tobacco Use    Smoking status: Former Smoker     Packs/day: 2.00     Years: 15.00     Pack years: 30.00     Types: Cigarettes     Start date: 1972     Quit date: 1987     Years since quittin.5    Smokeless tobacco: Never Used   Substance and Sexual Activity    Alcohol use: No    Drug use: No    Sexual activity: Not on file   Other Topics Concern    Not on file   Social History Narrative    Not on file     Social Determinants of Health     Financial Resource Strain:     Difficulty of Paying Living Expenses:    Food Insecurity:     Worried About Running Out of Food in the Last Year:     [address] in the Last Year:    Transportation Needs:     Lack of Transportation (Medical):  Lack of Transportation (Non-Medical):    Physical Activity:     Days of Exercise per Week:     Minutes of Exercise per Session:    Stress:     Feeling of Stress :    Social Connections:     Frequency of Communication with Friends and Family:     Frequency of Social Gatherings with Friends and Family:     Attends Adventist Services:     Active Member of Clubs or Organizations:     Attends Club or Organization Meetings:     Marital Status:    Intimate Partner Violence:     Fear of Current or Ex-Partner:     Emotionally Abused:     Physically Abused:     Sexually Abused:      No family history on file. Physical Exam:    Temp 98 °F (36.7 °C)   Ht 5' 8\" (1.727 m)   Wt 276 lb (125.2 kg)   LMP  (LMP Unknown)   BMI 41.97 kg/m²     GENERAL: alert, appears stated age, cooperative, no acute distress    HEENT: Head is normocephalic, atraumatic. PERRLA. SKIN: Clean, dry, intact. There is not any cellulitis or cutaneous lesions noted in the lower extremities except noted below in MSK    PULMONARY: breathing is regular and unlabored, no acute distress    CV: The bilateral upper and lower extremities are warm and well-perfused with brisk capillary refill.  2+ pulses UE and LE bilateral.     PSYCHIATRY: Pleasant mood, appropriate behavior, follows commands    NEURO: Sensation is intact distally with light touch with no alteration. Motor exam of the lower extremities show quadriceps, hamstrings, foot dorsiflexion and plantarflexion grossly intact 5/5. LYMPH: No lymphedema present distally in upper or lower extremity. MUSCULOSKELETAL:    Left knee Exam:    mild effusion noted. No erythema/induration/fluctuance. Posterior medial joint line TTP. Stable to varus and valgus at 0 and 30 degrees of flexion. Negative Lachman's and posterior drawer. Negative patellar grind test and J sign. Compartments soft and compressible throughout leg. Active range of motion 0-120 with pain. Positive Av's positive Apley's, gait is antalgic        Imaging:  X-rays taken of also resulted on medial dating 5/27/2021 show no acute osseous abnormality of the knee mild degenerative changes of the medial patellofemoral compartments. Amanda Alva was seen today for knee pain. Diagnoses and all orders for this visit:    Tear of meniscus of left knee, unspecified meniscus, unspecified tear type, unspecified whether old or current tear    Primary osteoarthritis of left knee  -     WI ARTHROCENTESIS ASPIR&/INJ MAJOR JT/BURSA W/O US    Other orders  -     triamcinolone acetonide (KENALOG-40) injection 40 mg        Patient seen and examined. X-rays reviewed. Patient has little to no arthritis noted on x-rays today. However patient's main complaint is instability of symptomatic knee. Exam and history is consistent with possible meniscus tear. MRI recommended for further evaluation management. Patient seen and examined. Imaging reviewed with patient in detail. Natural history and course discussed with patient in long discussion  Treatment options discussed with patient in detail including risks and benefits.   Patient should do well with conservative management as patient would like to avoid surgery at this time. Procedure Note Knee Cortisone Injection    The Left knee was identified as the injection site. The risk and benefits of a cortisone injection were explained and the patient consented to the injection. Under sterile conditions, the knee was injected with a mixture of 40 mg of Kenalog and Marcaine without complication. A sterile bandage was applied. In a 15 minute assessment and discussion, patient was counseled on continued weight loss, diet, and physical activity relating to this condition. She was educated with options in detail including nutrition, joining a health club/ weight loss program, and use of cardio equipment such as the Arc Trainer and the importance of use as well as range of motion and HEP exercises for weight loss. Berhane Price DO            25 minutes was spent with patient. 50% or greater was spent counseling the patient.

## 2021-07-12 ENCOUNTER — TELEPHONE (OUTPATIENT)
Dept: FAMILY MEDICINE CLINIC | Age: 66
End: 2021-07-12

## 2021-07-12 ENCOUNTER — ANTI-COAG VISIT (OUTPATIENT)
Dept: FAMILY MEDICINE CLINIC | Age: 66
End: 2021-07-12
Payer: COMMERCIAL

## 2021-07-12 DIAGNOSIS — I48.91 ATRIAL FIBRILLATION, UNSPECIFIED TYPE (HCC): Primary | ICD-10-CM

## 2021-07-12 LAB — INTERNATIONAL NORMALIZATION RATIO, POC: 1.5

## 2021-07-12 PROCEDURE — 85610 PROTHROMBIN TIME: CPT | Performed by: FAMILY MEDICINE

## 2021-07-12 NOTE — TELEPHONE ENCOUNTER
She needs to call GYN asap  Post menopausal bleeding, even on coumadin, needs to be checked out  If severe bleeding, go to ER please

## 2021-07-12 NOTE — TELEPHONE ENCOUNTER
Patient called in stating that she began heavy bleeding over the weekend and hasn't had menstrual cycle a in a long time. Patient states that she  is on coumadin and had a cortisone shot 10 days ago in her knee. Patient is not sure what is going on and wants to know what she should do.

## 2021-07-12 NOTE — TELEPHONE ENCOUNTER
This MA spoke with patient. She is coming down to the office ASAP to have her INR checked and phone number provided for OBGYN.     Electronically signed by Erin Maldonado on 7/12/2021 at 8:56 AM

## 2021-07-12 NOTE — PROGRESS NOTES
Patient came in today for INR. States she recently had a cortisone injection placed in her knee and does not really want to increase coumadin dose. States she has post menopausal bleeding (see encounter). Phone number provided to [facility] and patient advised if bleeding continues or gets worse, she needs to go to ER ASAP. Patient verbalizes understanding and states she will call for GYN appointment today.   Electronically signed by Tessy Caba on 7/12/2021 at 11:43 AM

## 2021-07-22 ENCOUNTER — NURSE ONLY (OUTPATIENT)
Dept: FAMILY MEDICINE CLINIC | Age: 66
End: 2021-07-22
Payer: COMMERCIAL

## 2021-07-22 DIAGNOSIS — I48.91 ATRIAL FIBRILLATION, UNSPECIFIED TYPE (HCC): Primary | ICD-10-CM

## 2021-07-22 LAB
INTERNATIONAL NORMALIZATION RATIO, POC: 1.4
PROTHROMBIN TIME, POC: 16.9

## 2021-07-22 PROCEDURE — 85610 PROTHROMBIN TIME: CPT | Performed by: FAMILY MEDICINE

## 2021-07-23 NOTE — PROGRESS NOTES
Patient called back. Patient instructed to continue taking current coumadin dosage and to recheck on Fri 7/30/2021. She scheduled for Mon 8/2/2021.

## 2021-07-23 NOTE — PROGRESS NOTES
This MA attempted to reach pt. No answer. This MA left message for pt requesting return call to discuss results.     Electronically signed by Ras Cruz MA on 7/23/21 at 11:42 AM EDT

## 2021-08-05 ENCOUNTER — HOSPITAL ENCOUNTER (OUTPATIENT)
Dept: MAMMOGRAPHY | Age: 66
Discharge: HOME | End: 2021-08-07
Payer: COMMERCIAL

## 2021-08-05 DIAGNOSIS — Z12.31 ENCOUNTER FOR SCREENING MAMMOGRAM FOR MALIGNANT NEOPLASM OF BREAST: ICD-10-CM

## 2021-08-05 PROCEDURE — 77063 BREAST TOMOSYNTHESIS BI: CPT

## 2021-08-10 ENCOUNTER — OFFICE VISIT (OUTPATIENT)
Dept: ORTHOPEDIC SURGERY | Age: 66
End: 2021-08-10
Payer: COMMERCIAL

## 2021-08-10 VITALS — WEIGHT: 270 LBS | TEMPERATURE: 98 F | HEIGHT: 68 IN | BODY MASS INDEX: 40.92 KG/M2

## 2021-08-10 DIAGNOSIS — S83.207A TEAR OF MENISCUS OF LEFT KNEE, UNSPECIFIED MENISCUS, UNSPECIFIED TEAR TYPE, UNSPECIFIED WHETHER OLD OR CURRENT TEAR: Primary | ICD-10-CM

## 2021-08-10 DIAGNOSIS — M17.12 PRIMARY OSTEOARTHRITIS OF LEFT KNEE: ICD-10-CM

## 2021-08-10 PROCEDURE — G8427 DOCREV CUR MEDS BY ELIG CLIN: HCPCS | Performed by: ORTHOPAEDIC SURGERY

## 2021-08-10 PROCEDURE — 99213 OFFICE O/P EST LOW 20 MIN: CPT | Performed by: ORTHOPAEDIC SURGERY

## 2021-08-10 PROCEDURE — G8417 CALC BMI ABV UP PARAM F/U: HCPCS | Performed by: ORTHOPAEDIC SURGERY

## 2021-08-10 PROCEDURE — 1036F TOBACCO NON-USER: CPT | Performed by: ORTHOPAEDIC SURGERY

## 2021-08-10 PROCEDURE — 3017F COLORECTAL CA SCREEN DOC REV: CPT | Performed by: ORTHOPAEDIC SURGERY

## 2021-08-10 NOTE — PROGRESS NOTES
Chief Complaint   Patient presents with    Knee Pain     left knee pain doing much better. HPI:    Patient is 60 y.o. female complaining chronic, atraumatic, insidious onset left knee pain for last several months. She admits to stiffness, deep, aching pain, swelling, difficulty with stairs and ambulating far distances. She admits to gross instability specifically in her Left knee. Previous treatments include rest, ice, and anti-inflammatory medication and HEP without much relief. Follows up after after cortisone injection stating he had moderate amount of relief. ROS:    Skin: (-) rash,(-) psoriasis,(-) eczema, (-)skin cancer. Neurologic: (-)numbness, (-)tingling, (-)headaches, (-) LOC. Cardiovascular: (-) Chest pain, (-) swelling in legs/feet, (-) SOB, (-) cramping in legs/feet with walking.     All other review of systems negative except stated above or in HPI      Past Medical History:   Diagnosis Date    Atrial fibrillation ([facility] [address]     Coronary heart disease 2013    Primary osteoarthritis of right shoulder 2019     Past Surgical History:   Procedure Laterality Date     SECTION      DILATION AND CURETTAGE OF UTERUS      ECHO COMPL W DOP COLOR FLOW  2013         TONSILLECTOMY         Current Outpatient Medications:     torsemide (DEMADEX) 10 MG tablet, TAKE 1 TABLET BY MOUTH DAILY, Disp: 90 tablet, Rfl: 1    warfarin (COUMADIN) 2.5 MG tablet, Take one tablet daily or as directed, Disp: 90 tablet, Rfl: 1    sotalol (BETAPACE) 120 MG tablet, TAKE 1 TABLET BY MOUTH TWICE DAILY, Disp: 180 tablet, Rfl: 1    baclofen (LIORESAL) 10 MG tablet, Take 1 tablet by mouth every evening, Disp: 90 tablet, Rfl: 0  Allergies   Allergen Reactions    Prednisone      Social History     Socioeconomic History    Marital status:      Spouse name: Not on file    Number of children: Not on file    Years of education: Not on file    Highest education level: Not on file Occupational History    Not on file   Tobacco Use    Smoking status: Former Smoker     Packs/day: 2.00     Years: 15.00     Pack years: 30.00     Types: Cigarettes     Start date: 1972     Quit date: 1987     Years since quittin.6    Smokeless tobacco: Never Used   Substance and Sexual Activity    Alcohol use: No    Drug use: No    Sexual activity: Not on file   Other Topics Concern    Not on file   Social History Narrative    Not on file     Social Determinants of Health     Financial Resource Strain:     Difficulty of Paying Living Expenses:    Food Insecurity:     Worried About Running Out of Food in the Last Year:     [address] in the Last Year:    Transportation Needs:     Lack of Transportation (Medical):  Lack of Transportation (Non-Medical):    Physical Activity:     Days of Exercise per Week:     Minutes of Exercise per Session:    Stress:     Feeling of Stress :    Social Connections:     Frequency of Communication with Friends and Family:     Frequency of Social Gatherings with Friends and Family:     Attends Restorationism Services:     Active Member of Clubs or Organizations:     Attends Club or Organization Meetings:     Marital Status:    Intimate Partner Violence:     Fear of Current or Ex-Partner:     Emotionally Abused:     Physically Abused:     Sexually Abused:      Family History   Problem Relation Age of Onset    Breast Cancer Maternal Grandmother     Breast Cancer Maternal Aunt            Physical Exam:    Temp 98 °F (36.7 °C)   Ht 5' 7.5\" (1.715 m)   Wt 270 lb (122.5 kg)   LMP  (LMP Unknown)   BMI 41.66 kg/m²     GENERAL: alert, appears stated age, cooperative, no acute distress    HEENT: Head is normocephalic, atraumatic. PERRLA. SKIN: Clean, dry, intact. There is not any cellulitis or cutaneous lesions noted in the lower extremities except noted below in MSK    PULMONARY: breathing is regular and unlabored, no acute distress    CV:  The bilateral upper and lower extremities are warm and well-perfused with brisk capillary refill. 2+ pulses UE and LE bilateral.     PSYCHIATRY: Pleasant mood, appropriate behavior, follows commands    NEURO: Sensation is intact distally with light touch with no alteration. Motor exam of the lower extremities show quadriceps, hamstrings, foot dorsiflexion and plantarflexion grossly intact 5/5. LYMPH: No lymphedema present distally in upper or lower extremity. MUSCULOSKELETAL:    Left knee Exam:    mild effusion noted. No erythema/induration/fluctuance. Posterior medial joint line TTP. Stable to varus and valgus at 0 and 30 degrees of flexion. Negative Lachman's and posterior drawer. Negative patellar grind test and J sign. Compartments soft and compressible throughout leg. Active range of motion 0-120 with pain. Positive Av's positive Apley's, gait is antalgic        Imaging:  X-rays taken of also resulted on media dated 5/27/2021 show no acute osseous abnormality of the knee mild degenerative changes of the medial patellofemoral compartments. Gia Corea was seen today for knee pain. Diagnoses and all orders for this visit:    Tear of meniscus of left knee, unspecified meniscus, unspecified tear type, unspecified whether old or current tear    Primary osteoarthritis of left knee        Patient seen and examined. X-rays reviewed. Patient has little to no arthritis noted on x-rays today. However patient's main complaint is instability of symptomatic knee. Exam and history is consistent with possible meniscus tear. MRI recommended for further evaluation management. Patient seen and examined. Imaging reviewed with patient in detail. Natural history and course discussed with patient in long discussion  Treatment options discussed with patient in detail including risks and benefits. Patient should do well with conservative management as patient would like to avoid surgery at this time. In a 15 minute assessment and discussion, patient was counseled on continued weight loss, diet, and physical activity relating to this condition. She was educated with options in detail including nutrition, joining a health club/ weight loss program, and use of cardio equipment such as the Arc Trainer and the importance of use as well as range of motion and HEP exercises for weight loss. Tino Huddleston DO            25 minutes was spent with patient. 50% or greater was spent counseling the patient.

## 2021-08-24 ENCOUNTER — HOSPITAL ENCOUNTER (OUTPATIENT)
Dept: ULTRASOUND IMAGING | Age: 66
Discharge: HOME | End: 2021-08-26
Payer: COMMERCIAL

## 2021-08-24 DIAGNOSIS — N83.202 CYST OF LEFT OVARY: ICD-10-CM

## 2021-08-24 DIAGNOSIS — R97.1 ELEVATED CA-125: ICD-10-CM

## 2021-08-24 PROCEDURE — 76830 TRANSVAGINAL US NON-OB: CPT

## 2021-09-09 ENCOUNTER — TELEPHONE (OUTPATIENT)
Dept: FAMILY MEDICINE CLINIC | Age: 66
End: 2021-09-09

## 2021-09-09 NOTE — TELEPHONE ENCOUNTER
----- Message from [facility] sent at 9/9/2021 12:01 PM EDT -----  Subject: Message to Provider    QUESTIONS  Information for Provider? Pt would like to have INR checked. ---------------------------------------------------------------------------  --------------  Reece WERNER  What is the best way for the office to contact you? OK to leave message on   voicemail  Preferred Call Back Phone Number? [phone]  ---------------------------------------------------------------------------  --------------  SCRIPT ANSWERS  Relationship to Patient?  Self

## 2021-09-21 NOTE — TELEPHONE ENCOUNTER
Pt scheduled 9/30 for INR.       Electronically signed by Priyanka Powers MA on 9/21/21 at 5:12 PM EDT

## 2021-09-30 ENCOUNTER — NURSE ONLY (OUTPATIENT)
Dept: FAMILY MEDICINE CLINIC | Age: 66
End: 2021-09-30

## 2021-09-30 DIAGNOSIS — I48.91 ATRIAL FIBRILLATION, UNSPECIFIED TYPE (HCC): Primary | ICD-10-CM

## 2021-09-30 LAB
INTERNATIONAL NORMALIZATION RATIO, POC: 2.3
PROTHROMBIN TIME, POC: 27

## 2021-09-30 PROCEDURE — 85610 PROTHROMBIN TIME: CPT | Performed by: FAMILY MEDICINE

## 2021-09-30 ASSESSMENT — PATIENT HEALTH QUESTIONNAIRE - PHQ9
SUM OF ALL RESPONSES TO PHQ QUESTIONS 1-9: 0
SUM OF ALL RESPONSES TO PHQ9 QUESTIONS 1 & 2: 0
SUM OF ALL RESPONSES TO PHQ QUESTIONS 1-9: 0
1. LITTLE INTEREST OR PLEASURE IN DOING THINGS: 0
SUM OF ALL RESPONSES TO PHQ QUESTIONS 1-9: 0
2. FEELING DOWN, DEPRESSED OR HOPELESS: 0

## 2021-09-30 NOTE — PROGRESS NOTES
This MA spoke with pt - pt advised of results and recommendations per Dr. Harish Steve. Pt verbalized she understood and is amendable to recheck in 1 month. Pt states that she will call to schedule as she is not home.     Electronically signed by Luca Rojas MA on 9/30/21 at 2:41 PM EDT

## 2021-10-12 PROBLEM — G89.18 POST-OP PAIN: Status: ACTIVE | Noted: 2021-10-12

## 2021-11-01 ENCOUNTER — NURSE ONLY (OUTPATIENT)
Dept: FAMILY MEDICINE CLINIC | Age: 66
End: 2021-11-01

## 2021-11-01 DIAGNOSIS — I48.91 ATRIAL FIBRILLATION, UNSPECIFIED TYPE (HCC): Primary | ICD-10-CM

## 2021-11-01 DIAGNOSIS — I48.20 CHRONIC ATRIAL FIBRILLATION (HCC): ICD-10-CM

## 2021-11-01 LAB
INTERNATIONAL NORMALIZATION RATIO, POC: 1.9
PROTHROMBIN TIME, POC: 22.6

## 2021-11-01 PROCEDURE — 85610 PROTHROMBIN TIME: CPT | Performed by: FAMILY MEDICINE

## 2021-11-01 RX ORDER — WARFARIN SODIUM 2.5 MG/1
TABLET ORAL
Qty: 90 TABLET | Refills: 1 | Status: SHIPPED
Start: 2021-11-01 | End: 2022-03-08

## 2021-11-01 NOTE — PROGRESS NOTES
Patient instructed to continue current coumadin dosage, and to recheck in 1 month. She says she will call back to schedule this.

## 2021-11-02 NOTE — PROGRESS NOTES
Chief Complaint   Patient presents with    Knee Pain     Left knee pain follow up. Her knee was doing well, pain starting back about a week ago. HPI:    Patient is 73 y.o. female complaining chronic, atraumatic, insidious onset left knee pain for last several months. She admits to stiffness, deep, aching pain, swelling, difficulty with stairs and ambulating far distances. She admits to gross instability specifically in her Left knee. Previous treatments include rest, ice, and anti-inflammatory medication and HEP without much relief. Follows up after after cortisone injection stating he had moderate amount of relief. ROS:    Skin: (-) rash,(-) psoriasis,(-) eczema, (-)skin cancer. Neurologic: (-)numbness, (-)tingling, (-)headaches, (-) LOC. Cardiovascular: (-) Chest pain, (-) swelling in legs/feet, (-) SOB, (-) cramping in legs/feet with walking.     All other review of systems negative except stated above or in HPI      Past Medical History:   Diagnosis Date    Atrial fibrillation ([facility] [address]     Coronary heart disease 2013    Endometrial hyperplasia     Pelvic mass     Primary osteoarthritis of right shoulder 2019     Past Surgical History:   Procedure Laterality Date     SECTION      DILATION AND CURETTAGE OF UTERUS      ECHO COMPL W DOP COLOR FLOW  2013         HYSTERECTOMY  10/12/2021    TONSILLECTOMY         Current Outpatient Medications:     warfarin (COUMADIN) 2.5 MG tablet, Take one tablet daily or as directed, Disp: 90 tablet, Rfl: 1    ibuprofen (ADVIL;MOTRIN) 600 MG tablet, Take 1 tablet by mouth 4 times daily as needed for Pain, Disp: 40 tablet, Rfl: 0    docusate sodium (COLACE) 100 MG capsule, Take 1 capsule by mouth 2 times daily, Disp: 60 capsule, Rfl: 0    vitamin D (CHOLECALCIFEROL) 50 MCG ( UT) TABS tablet, Take 2,000 Units by mouth daily Takes 5 times a week, Disp: , Rfl:     COD LIVER OIL PO, Take by mouth Takes 5 times a week, Disp: , Rfl:     Potassium Gluconate 595 MG CAPS, Take by mouth three times a week, Disp: , Rfl:     MAGNESIUM-ZINC PO, Take by mouth four times a week, Disp: , Rfl:     medroxyPROGESTERone (PROVERA) 10 MG tablet, Take 10 mg by mouth daily Takes two tablets in the morning and one tablet at night, Disp: , Rfl:     Ascorbic Acid (VITAMIN C PO), Take by mouth daily Chewable, Disp: , Rfl:     torsemide (DEMADEX) 10 MG tablet, TAKE 1 TABLET BY MOUTH DAILY, Disp: 90 tablet, Rfl: 1    sotalol (BETAPACE) 120 MG tablet, TAKE 1 TABLET BY MOUTH TWICE DAILY, Disp: 180 tablet, Rfl: 1    baclofen (LIORESAL) 10 MG tablet, Take 1 tablet by mouth every evening, Disp: 90 tablet, Rfl: 0  Allergies   Allergen Reactions    Prednisone      Social History     Socioeconomic History    Marital status:      Spouse name: Not on file    Number of children: Not on file    Years of education: Not on file    Highest education level: Not on file   Occupational History    Not on file   Tobacco Use    Smoking status: Former Smoker     Packs/day: 2.00     Years: 15.00     Pack years: 30.00     Types: Cigarettes     Start date: 1972     Quit date: 1987     Years since quittin.8    Smokeless tobacco: Never Used   Substance and Sexual Activity    Alcohol use: No    Drug use: No    Sexual activity: Not on file   Other Topics Concern    Not on file   Social History Narrative    Not on file     Social Determinants of Health     Financial Resource Strain:     Difficulty of Paying Living Expenses:    Food Insecurity:     Worried About Running Out of Food in the Last Year:     Ran Out of Food in the Last Year:    Transportation Needs:     Lack of Transportation (Medical):      Lack of Transportation (Non-Medical):    Physical Activity:     Days of Exercise per Week:     Minutes of Exercise per Session:    Stress:     Feeling of Stress :    Social Connections:     Frequency of Communication with Friends and Family:     Frequency of Social Gatherings with Friends and Family:     Attends Hinduism Services:     Active Member of Clubs or Organizations:     Attends Club or Organization Meetings:     Marital Status:    Intimate Partner Violence:     Fear of Current or Ex-Partner:     Emotionally Abused:     Physically Abused:     Sexually Abused:      Family History   Problem Relation Age of Onset    Breast Cancer Maternal Grandmother     Breast Cancer Maternal Aunt     Ovarian Cancer Neg Hx     Colon Cancer Neg Hx            Physical Exam:    Ht 5' 8\" (1.727 m)   Wt 273 lb (123.8 kg)   LMP  (LMP Unknown)   BMI 41.51 kg/m²     GENERAL: alert, appears stated age, cooperative, no acute distress    HEENT: Head is normocephalic, atraumatic. PERRLA. SKIN: Clean, dry, intact. There is not any cellulitis or cutaneous lesions noted in the lower extremities except noted below in MSK    PULMONARY: breathing is regular and unlabored, no acute distress    CV: The bilateral upper and lower extremities are warm and well-perfused with brisk capillary refill. 2+ pulses UE and LE bilateral.     PSYCHIATRY: Pleasant mood, appropriate behavior, follows commands    NEURO: Sensation is intact distally with light touch with no alteration. Motor exam of the lower extremities show quadriceps, hamstrings, foot dorsiflexion and plantarflexion grossly intact 5/5. LYMPH: No lymphedema present distally in upper or lower extremity. MUSCULOSKELETAL:    Left knee Exam:    mild effusion noted. No erythema/induration/fluctuance. Posterior medial joint line TTP. Stable to varus and valgus at 0 and 30 degrees of flexion. Negative Lachman's and posterior drawer. Negative patellar grind test and J sign. Compartments soft and compressible throughout leg. Active range of motion 0-120 with pain. Positive Av's positive Apley's, gait is antalgic    no change since last visit.     Imaging:  X-rays taken of also resulted on media dated 5/27/2021 show no acute osseous abnormality of the knee mild degenerative changes of the medial patellofemoral compartments. Joselin Roach was seen today for knee pain. Diagnoses and all orders for this visit:    Tear of meniscus of left knee, unspecified meniscus, unspecified tear type, unspecified whether old or current tear    Primary osteoarthritis of left knee  -     MT ARTHROCENTESIS ASPIR&/INJ MAJOR JT/BURSA W/O US    Other orders  -     triamcinolone acetonide (KENALOG-40) injection 40 mg        Patient seen and examined. X-rays reviewed. Patient has little to no arthritis noted on x-rays today. However patient's main complaint is instability of symptomatic knee. Exam and history is consistent with possible meniscus tear. MRI recommended for further evaluation management. Patient seen and examined. Imaging reviewed with patient in detail. Natural history and course discussed with patient in long discussion  Treatment options discussed with patient in detail including risks and benefits. Patient should do well with conservative management as patient would like to avoid surgery at this time. Procedure Note Knee Cortisone Injection    The Left knee was identified as the injection site. The risk and benefits of a cortisone injection were explained and the patient consented to the injection. Under sterile conditions, the knee was injected with a mixture of 40 mg of Kenalog and Marcaine without complication. A sterile bandage was applied. In a 15 minute assessment and discussion, patient was counseled on continued weight loss, diet, and physical activity relating to this condition. She was educated with options in detail including nutrition, joining a health club/ weight loss program, and use of cardio equipment such as the Arc Trainer and the importance of use as well as range of motion and HEP exercises for weight loss.        Kong Briscoe DO

## 2021-11-04 ENCOUNTER — OFFICE VISIT (OUTPATIENT)
Dept: ORTHOPEDIC SURGERY | Age: 66
End: 2021-11-04

## 2021-11-04 VITALS — BODY MASS INDEX: 41.37 KG/M2 | WEIGHT: 273 LBS | HEIGHT: 68 IN

## 2021-11-04 DIAGNOSIS — S83.207A TEAR OF MENISCUS OF LEFT KNEE, UNSPECIFIED MENISCUS, UNSPECIFIED TEAR TYPE, UNSPECIFIED WHETHER OLD OR CURRENT TEAR: Primary | ICD-10-CM

## 2021-11-04 DIAGNOSIS — M17.12 PRIMARY OSTEOARTHRITIS OF LEFT KNEE: ICD-10-CM

## 2021-11-04 PROCEDURE — 20610 DRAIN/INJ JOINT/BURSA W/O US: CPT | Performed by: ORTHOPAEDIC SURGERY

## 2021-11-04 PROCEDURE — 99213 OFFICE O/P EST LOW 20 MIN: CPT | Performed by: ORTHOPAEDIC SURGERY

## 2021-11-04 RX ORDER — TRIAMCINOLONE ACETONIDE 40 MG/ML
40 INJECTION, SUSPENSION INTRA-ARTICULAR; INTRAMUSCULAR ONCE
Status: COMPLETED | OUTPATIENT
Start: 2021-11-04 | End: 2021-11-04

## 2021-11-04 RX ADMIN — TRIAMCINOLONE ACETONIDE 40 MG: 40 INJECTION, SUSPENSION INTRA-ARTICULAR; INTRAMUSCULAR at 09:24

## 2021-11-10 ENCOUNTER — OFFICE VISIT (OUTPATIENT)
Dept: FAMILY MEDICINE CLINIC | Age: 66
End: 2021-11-10

## 2021-11-10 VITALS
RESPIRATION RATE: 16 BRPM | DIASTOLIC BLOOD PRESSURE: 78 MMHG | OXYGEN SATURATION: 98 % | TEMPERATURE: 97.4 F | SYSTOLIC BLOOD PRESSURE: 116 MMHG | HEART RATE: 84 BPM | BODY MASS INDEX: 40.77 KG/M2 | WEIGHT: 269 LBS | HEIGHT: 68 IN

## 2021-11-10 DIAGNOSIS — E66.01 OBESITY, CLASS III, BMI 40-49.9 (MORBID OBESITY) (HCC): ICD-10-CM

## 2021-11-10 DIAGNOSIS — I10 PRIMARY HYPERTENSION: ICD-10-CM

## 2021-11-10 DIAGNOSIS — D22.9 ATYPICAL NEVI: ICD-10-CM

## 2021-11-10 DIAGNOSIS — Z00.00 ROUTINE GENERAL MEDICAL EXAMINATION AT A HEALTH CARE FACILITY: ICD-10-CM

## 2021-11-10 DIAGNOSIS — M26.609 TMJ DYSFUNCTION: ICD-10-CM

## 2021-11-10 DIAGNOSIS — Z12.11 SCREENING FOR COLON CANCER: ICD-10-CM

## 2021-11-10 DIAGNOSIS — Z00.00 MEDICARE ANNUAL WELLNESS VISIT, INITIAL: Primary | ICD-10-CM

## 2021-11-10 DIAGNOSIS — K43.2 INCISIONAL HERNIA, WITHOUT OBSTRUCTION OR GANGRENE: ICD-10-CM

## 2021-11-10 DIAGNOSIS — I48.91 ATRIAL FIBRILLATION, UNSPECIFIED TYPE (HCC): ICD-10-CM

## 2021-11-10 PROBLEM — S03.00XA DISLOCATION OF JAW: Status: ACTIVE | Noted: 2021-11-10

## 2021-11-10 PROCEDURE — G0402 INITIAL PREVENTIVE EXAM: HCPCS | Performed by: FAMILY MEDICINE

## 2021-11-10 ASSESSMENT — PATIENT HEALTH QUESTIONNAIRE - PHQ9
2. FEELING DOWN, DEPRESSED OR HOPELESS: 0
1. LITTLE INTEREST OR PLEASURE IN DOING THINGS: 0
SUM OF ALL RESPONSES TO PHQ QUESTIONS 1-9: 0
SUM OF ALL RESPONSES TO PHQ QUESTIONS 1-9: 0
SUM OF ALL RESPONSES TO PHQ9 QUESTIONS 1 & 2: 0
SUM OF ALL RESPONSES TO PHQ QUESTIONS 1-9: 0

## 2021-11-10 ASSESSMENT — LIFESTYLE VARIABLES: HOW OFTEN DO YOU HAVE A DRINK CONTAINING ALCOHOL: 0

## 2021-11-14 PROBLEM — Z12.11 SCREENING FOR COLON CANCER: Status: ACTIVE | Noted: 2021-11-14

## 2021-11-14 PROBLEM — M26.609 TMJ DYSFUNCTION: Status: ACTIVE | Noted: 2021-11-14

## 2021-11-14 PROBLEM — K43.2 INCISIONAL HERNIA, WITHOUT OBSTRUCTION OR GANGRENE: Status: ACTIVE | Noted: 2021-11-14

## 2021-11-14 PROBLEM — Z00.00 MEDICARE ANNUAL WELLNESS VISIT, SUBSEQUENT: Status: ACTIVE | Noted: 2021-11-14

## 2021-11-14 PROBLEM — D22.9 ATYPICAL NEVI: Status: ACTIVE | Noted: 2021-11-14

## 2021-11-15 NOTE — PATIENT INSTRUCTIONS
Personalized Preventive Plan for Mynor Ariza - 11/10/2021  Medicare offers a range of preventive health benefits. Some of the tests and screenings are paid in full while other may be subject to a deductible, co-insurance, and/or copay. Some of these benefits include a comprehensive review of your medical history including lifestyle, illnesses that may run in your family, and various assessments and screenings as appropriate. After reviewing your medical record and screening and assessments performed today your provider may have ordered immunizations, labs, imaging, and/or referrals for you. A list of these orders (if applicable) as well as your Preventive Care list are included within your After Visit Summary for your review. Other Preventive Recommendations:    · A preventive eye exam performed by an eye specialist is recommended every 1-2 years to screen for glaucoma; cataracts, macular degeneration, and other eye disorders. · A preventive dental visit is recommended every 6 months. · Try to get at least 150 minutes of exercise per week or 10,000 steps per day on a pedometer . · Order or download the FREE \"Exercise & Physical Activity: Your Everyday Guide\" from [employer]. Call 5-738.647.5649 or search [employer] online. · You need 9934-3206 mg of calcium and 2133-9311 IU of vitamin D per day. It is possible to meet your calcium requirement with diet alone, but a vitamin D supplement is usually necessary to meet this goal.  · When exposed to the sun, use a sunscreen that protects against both UVA and UVB radiation with an SPF of 30 or greater. Reapply every 2 to 3 hours or after sweating, drying off with a towel, or swimming. · Always wear a seat belt when traveling in a car. Always wear a helmet when riding a bicycle or motorcycle.

## 2021-11-15 NOTE — PROGRESS NOTES
Medicare Annual Wellness Visit  Name: Jany Holt Date: 2021   MRN: [id number] Sex: Female   Age: 73 y.o. Ethnicity: Non- / Non    : 1955 Race: White (non-)      Pratik Grey is here for Medicare AWV, Jaw Pain (Jaw cracks frequently, painful at times.), and Health Maintenance (Patient does not want colonoscopy or DEXA.)    Screenings for behavioral, psychosocial and functional/safety risks, and cognitive dysfunction are all negative except as indicated below. These results, as well as other patient data from the [address] form, are documented in Flowsheets linked to this Encounter. Allergies   Allergen Reactions    Prednisone        Prior to Visit Medications    Medication Sig Taking?  Authorizing Provider   warfarin (COUMADIN) 2.5 MG tablet Take one tablet daily or as directed Yes Nita Mcclain DO   ibuprofen (ADVIL;MOTRIN) 600 MG tablet Take 1 tablet by mouth 4 times daily as needed for Pain Yes Ashly Pa,    vitamin D (CHOLECALCIFEROL) 50 MCG (2000) TABS tablet Take 2,000 Units by mouth daily Takes 5 times a week Yes Historical Provider, MD   COD LIVER OIL PO Take by mouth Takes 5 times a week Yes Historical Provider, MD   Potassium Gluconate 595 MG CAPS Take by mouth three times a week Yes Historical Provider, MD   MAGNESIUM-ZINC PO Take by mouth four times a week Yes Historical Provider, MD   Ascorbic Acid (VITAMIN C PO) Take by mouth daily Chewable Yes Historical Provider, MD   torsemide (DEMADEX) 10 MG tablet TAKE 1 TABLET BY MOUTH DAILY Yes Hiren Mcclain DO   sotalol (BETAPACE) 120 MG tablet TAKE 1 TABLET BY MOUTH TWICE DAILY Yes Bing Mcclain DO   baclofen (LIORESAL) 10 MG tablet Take 1 tablet by mouth every evening Yes Cherry Granda DO       Past Medical History:   Diagnosis Date    Atrial fibrillation ([facility] [address]     Coronary heart disease 2013    Endometrial hyperplasia     Pelvic mass     Primary osteoarthritis of right shoulder 2019       Past Surgical History:   Procedure Laterality Date     SECTION      DILATION AND CURETTAGE OF UTERUS      ECHO COMPL W DOP COLOR FLOW  2013         HYSTERECTOMY  10/12/2021    TONSILLECTOMY         Family History   Problem Relation Age of Onset    Breast Cancer Maternal Grandmother     Breast Cancer Maternal Aunt     Ovarian Cancer Neg Hx     Colon Cancer Neg Hx        CareTeam (Including outside providers/suppliers regularly involved in providing care):   Patient Care Team:  James Lehman DO as PCP - General  Hiren Olivares DO as PCP - [facility] Empaneled Provider    Wt Readings from Last 3 Encounters:   11/10/21 269 lb (122 kg)   21 273 lb (123.8 kg)   21 273 lb 6.4 oz (124 kg)     Vitals:    11/10/21 0809   BP: 116/78   Pulse: 84   Resp: 16   Temp: 97.4 °F (36.3 °C)   SpO2: 98%   Weight: 269 lb (122 kg)   Height: 5' 8\" (1.727 m)     Body mass index is 40.9 kg/m². Based upon direct observation of the patient, evaluation of cognition reveals recent and remote memory intact. General Appearance: alert and oriented to person, place and time, well-developed and well-nourished, in no acute distress and Patient has morbid obesity. Patient instructed on low calorie, healthy diet. Skin: pt has nevi. Head: normocephalic and atraumatic  Eyes: pupils equal, round, and reactive to light, extraocular eye movements intact, conjunctivae normal  ENT: tympanic membrane, external ear and ear canal normal bilaterally, oropharynx clear and moist with normal mucous membranes  Neck: neck supple and non tender without mass, no thyromegaly or thyroid nodules, no cervical lymphadenopathy   Pulmonary/Chest: clear to auscultation bilaterally- no wheezes, rales or rhonchi, normal air movement, no respiratory distress  Cardiovascular: normal rate, regular rhythm, no murmurs and pt has controlled. Atrial fib.   Abdomen: soft, non-tender, bowel sounds. Pt has incisional hernia, no pain, no gangrene. Extremities: no cyanosis and no clubbing  Musculoskeletal: Pain and decreased ROM multiple joints. Neurologic: gait and coordination normal and speech normal    Patient's complete Health Risk Assessment and screening values have been reviewed and are found in Flowsheets. The following problems were reviewed today and where indicated follow up appointments were made and/or referrals ordered. Positive Risk Factor Screenings with Interventions:           Health Habits/Nutrition:  Health Habits/Nutrition  Do you exercise for at least 20 minutes 2-3 times per week?: (!) No  Have you lost any weight without trying in the past 3 months?: No  Do you eat only one meal per day?: No  Have you seen the dentist within the past year?: Yes  Body mass index: (!) 40.9  Health Habits/Nutrition Interventions:  · pt instructed on yearly eye and dental exams. Personalized Preventive Plan   Current Health Maintenance Status  There is no immunization history for the selected administration types on file for this patient.      Health Maintenance   Topic Date Due    Hepatitis C screen  Never done    COVID-19 Vaccine (1) Never done    HIV screen  Never done    DTaP/Tdap/Td vaccine (1 - Tdap) Never done    Colon cancer screen colonoscopy  Never done    Shingles Vaccine (1 of 2) Never done    DEXA (modify frequency per FRAX score)  Never done    Flu vaccine (1) Never done    Pneumococcal 65+ years Vaccine (1 of 1 - PPSV23) Never done   [employer] Visit (AWV)  Never done    A1C test (Diabetic or Prediabetic)  10/19/2021    Potassium monitoring  07/27/2022    Creatinine monitoring  07/27/2022    Breast cancer screen  08/05/2023    Lipid screen  02/10/2024    Cervical cancer screen  07/27/2026    Hepatitis A vaccine  Aged Out    Hepatitis B vaccine  Aged Out    Hib vaccine  Aged Out    Meningococcal (ACWY) vaccine  Aged Out Recommendations for Preventive Services Due: see orders and patient instructions/AVS.  . Recommended screening schedule for the next 5-10 years is provided to the patient in written form: see Patient Instructions/AVS.    Essence Wood was seen today for medicare awv, jaw pain and health maintenance. Diagnoses and all orders for this visit:    Medicare annual wellness visit, subsequent    Atrial fibrillation, unspecified type ([address]  Controlled. Betapace, coumadin. Primary hypertension  Controlled. Betapace, demadex, low salt diet. TMJ dysfunction  Stable. Moist heat, dentist.   Screening for colon cancer  -     Cologuard (For External Results Only); Future    Atypical nevi  -     AFL - Azul Jo MD, Dermatology, [place]    Incisional hernia, without obstruction or gangrene  Stable. Pt instructed if any worse go ED ASAP.

## 2021-12-14 PROBLEM — Z00.00 MEDICARE ANNUAL WELLNESS VISIT, SUBSEQUENT: Status: RESOLVED | Noted: 2021-11-14 | Resolved: 2021-12-14

## 2021-12-14 PROBLEM — Z12.11 SCREENING FOR COLON CANCER: Status: RESOLVED | Noted: 2021-11-14 | Resolved: 2021-12-14

## 2022-01-07 ENCOUNTER — NURSE ONLY (OUTPATIENT)
Dept: FAMILY MEDICINE CLINIC | Age: 67
End: 2022-01-07

## 2022-01-07 DIAGNOSIS — I48.20 CHRONIC ATRIAL FIBRILLATION (HCC): ICD-10-CM

## 2022-01-07 DIAGNOSIS — I10 ESSENTIAL HYPERTENSION: ICD-10-CM

## 2022-01-07 DIAGNOSIS — I48.91 ATRIAL FIBRILLATION, UNSPECIFIED TYPE (HCC): Primary | ICD-10-CM

## 2022-01-07 LAB
INTERNATIONAL NORMALIZATION RATIO, POC: 1.7
PROTHROMBIN TIME, POC: 20.3

## 2022-01-07 PROCEDURE — 85610 PROTHROMBIN TIME: CPT | Performed by: FAMILY MEDICINE

## 2022-01-07 RX ORDER — SOTALOL HYDROCHLORIDE 120 MG/1
TABLET ORAL
Qty: 180 TABLET | Refills: 1 | Status: SHIPPED
Start: 2022-01-07 | End: 2022-07-05

## 2022-02-11 ENCOUNTER — OFFICE VISIT (OUTPATIENT)
Dept: ORTHOPEDIC SURGERY | Age: 67
End: 2022-02-11
Payer: COMMERCIAL

## 2022-02-11 VITALS — HEIGHT: 68 IN | WEIGHT: 270 LBS | BODY MASS INDEX: 40.92 KG/M2

## 2022-02-11 DIAGNOSIS — S83.207A TEAR OF MENISCUS OF LEFT KNEE, UNSPECIFIED MENISCUS, UNSPECIFIED TEAR TYPE, UNSPECIFIED WHETHER OLD OR CURRENT TEAR: Primary | ICD-10-CM

## 2022-02-11 DIAGNOSIS — M17.12 PRIMARY OSTEOARTHRITIS OF LEFT KNEE: ICD-10-CM

## 2022-02-11 DIAGNOSIS — E66.01 OBESITY, CLASS III, BMI 40-49.9 (MORBID OBESITY) (HCC): ICD-10-CM

## 2022-02-11 PROCEDURE — 20610 DRAIN/INJ JOINT/BURSA W/O US: CPT | Performed by: ORTHOPAEDIC SURGERY

## 2022-02-11 NOTE — PROGRESS NOTES
Chief Complaint   Patient presents with    Knee Pain     Left knee pain follow up. She had short term relief from injection on 11/4/21. Pain and swelling have increased over the past week. The patient has failed conservative measures such as NSAIDS, HEP, and cortisone injections. She is an excellent candidate for Zilretta injections  in the Left knee. We will contact the patient's insurance company and see them back in the office once we have received approval.     I will proceed with a cortisone injection in the Left knee. Verbal and written consent was obtained for the injections. The skin was prepped with alcohol. A prepared mixture of 32 mg of Zilretta and 5mL diluent was injected to Left knee. The injection was given through the lateral side of the knee. The patient tolerated the injection well. I will see the patient back prn. Darian Kamara was seen today for knee pain.     Diagnoses and all orders for this visit:    Tear of meniscus of left knee, unspecified meniscus, unspecified tear type, unspecified whether old or current tear    Primary osteoarthritis of left knee  -     SC ARTHROCENTESIS ASPIR&/INJ MAJOR JT/BURSA W/O US    Obesity, Class III, BMI 40-49.9 (morbid obesity) ([facility] [address]    Other orders  -     triamcinolone acetonide (ZILRETTA) intra-articular injection 32 mg

## 2022-03-07 DIAGNOSIS — I48.91 ATRIAL FIBRILLATION, UNSPECIFIED TYPE (HCC): ICD-10-CM

## 2022-03-07 DIAGNOSIS — I48.20 CHRONIC ATRIAL FIBRILLATION (HCC): ICD-10-CM

## 2022-03-08 RX ORDER — WARFARIN SODIUM 2.5 MG/1
TABLET ORAL
Qty: 90 TABLET | Refills: 1 | Status: SHIPPED | OUTPATIENT
Start: 2022-03-08

## 2022-03-08 RX ORDER — WARFARIN SODIUM 2.5 MG/1
TABLET ORAL
Qty: 90 TABLET | Refills: 0 | Status: SHIPPED
Start: 2022-03-08 | End: 2022-05-02 | Stop reason: SDUPTHER

## 2022-03-23 ENCOUNTER — TELEMEDICINE (OUTPATIENT)
Dept: FAMILY MEDICINE CLINIC | Age: 67
End: 2022-03-23
Payer: MEDICARE

## 2022-03-23 DIAGNOSIS — E78.5 DYSLIPIDEMIA: ICD-10-CM

## 2022-03-23 DIAGNOSIS — H10.021 PINK EYE DISEASE OF RIGHT EYE: ICD-10-CM

## 2022-03-23 DIAGNOSIS — R73.01 IFG (IMPAIRED FASTING GLUCOSE): ICD-10-CM

## 2022-03-23 DIAGNOSIS — R53.83 FATIGUE, UNSPECIFIED TYPE: ICD-10-CM

## 2022-03-23 DIAGNOSIS — I48.91 ATRIAL FIBRILLATION, UNSPECIFIED TYPE (HCC): Primary | ICD-10-CM

## 2022-03-23 DIAGNOSIS — I10 PRIMARY HYPERTENSION: ICD-10-CM

## 2022-03-23 PROCEDURE — 99213 OFFICE O/P EST LOW 20 MIN: CPT | Performed by: FAMILY MEDICINE

## 2022-03-23 PROCEDURE — G8417 CALC BMI ABV UP PARAM F/U: HCPCS | Performed by: FAMILY MEDICINE

## 2022-03-23 PROCEDURE — 1036F TOBACCO NON-USER: CPT | Performed by: FAMILY MEDICINE

## 2022-03-23 PROCEDURE — G8400 PT W/DXA NO RESULTS DOC: HCPCS | Performed by: FAMILY MEDICINE

## 2022-03-23 PROCEDURE — G8484 FLU IMMUNIZE NO ADMIN: HCPCS | Performed by: FAMILY MEDICINE

## 2022-03-23 PROCEDURE — G8427 DOCREV CUR MEDS BY ELIG CLIN: HCPCS | Performed by: FAMILY MEDICINE

## 2022-03-23 PROCEDURE — 1123F ACP DISCUSS/DSCN MKR DOCD: CPT | Performed by: FAMILY MEDICINE

## 2022-03-23 PROCEDURE — 3017F COLORECTAL CA SCREEN DOC REV: CPT | Performed by: FAMILY MEDICINE

## 2022-03-23 PROCEDURE — 4040F PNEUMOC VAC/ADMIN/RCVD: CPT | Performed by: FAMILY MEDICINE

## 2022-03-23 PROCEDURE — 1090F PRES/ABSN URINE INCON ASSESS: CPT | Performed by: FAMILY MEDICINE

## 2022-03-23 RX ORDER — LORATADINE 10 MG/1
10 TABLET ORAL DAILY
Qty: 30 TABLET | Refills: 3 | Status: SHIPPED | OUTPATIENT
Start: 2022-03-23

## 2022-03-23 RX ORDER — TOBRAMYCIN AND DEXAMETHASONE 3; 1 MG/ML; MG/ML
1 SUSPENSION/ DROPS OPHTHALMIC
Qty: 1 EACH | Refills: 0 | Status: SHIPPED | OUTPATIENT
Start: 2022-03-23 | End: 2022-04-02

## 2022-03-23 RX ORDER — FLUTICASONE PROPIONATE 50 MCG
1 SPRAY, SUSPENSION (ML) NASAL DAILY
Qty: 16 G | Refills: 3 | Status: SHIPPED | OUTPATIENT
Start: 2022-03-23

## 2022-03-23 SDOH — ECONOMIC STABILITY: FOOD INSECURITY: WITHIN THE PAST 12 MONTHS, THE FOOD YOU BOUGHT JUST DIDN'T LAST AND YOU DIDN'T HAVE MONEY TO GET MORE.: NEVER TRUE

## 2022-03-23 SDOH — ECONOMIC STABILITY: FOOD INSECURITY: WITHIN THE PAST 12 MONTHS, YOU WORRIED THAT YOUR FOOD WOULD RUN OUT BEFORE YOU GOT MONEY TO BUY MORE.: NEVER TRUE

## 2022-03-23 ASSESSMENT — ENCOUNTER SYMPTOMS
APNEA: 0
CHEST TIGHTNESS: 0
FACIAL SWELLING: 0
COUGH: 0
ALLERGIC/IMMUNOLOGIC NEGATIVE: 1
SINUS PAIN: 0
HOARSE VOICE: 0
SHORTNESS OF BREATH: 0
BLOOD IN STOOL: 0
RECTAL PAIN: 0
VOICE CHANGE: 0
CHOKING: 0
BLURRED VISION: 0
SORE THROAT: 0
SINUS PRESSURE: 1
TROUBLE SWALLOWING: 0
BACK PAIN: 0
RESPIRATORY NEGATIVE: 1
ABDOMINAL DISTENTION: 0
ORTHOPNEA: 0
ANAL BLEEDING: 0
COLOR CHANGE: 0
GASTROINTESTINAL NEGATIVE: 1
SWOLLEN GLANDS: 0

## 2022-03-23 ASSESSMENT — SOCIAL DETERMINANTS OF HEALTH (SDOH): HOW HARD IS IT FOR YOU TO PAY FOR THE VERY BASICS LIKE FOOD, HOUSING, MEDICAL CARE, AND HEATING?: NOT HARD AT ALL

## 2022-03-23 NOTE — PROGRESS NOTES
SUBJECTIVE  Asya Osei is a 73 y.o. female. HPI/Chief C/O:  Chief Complaint   Patient presents with    Conjunctivitis     Pt c/o possible pink eye, symptoms started Monday    Sinus Problem     Pt also c/o sinus congestion/drainage    Other     Pt consents to the telemedicine visit and is currently in [place]     No Active Allergies  TeleMedicine Video Visit    Asya Osei, was evaluated through a synchronous (real-time) audio-video encounter. The patient (or guardian if applicable) is aware that this is a billable service. , which includes applicable co-pays. This virtual visit was conducted with the patient's  (and/or legal guardian's) consent. The visit was conducted pursuant to the emergency declaration under the [address] [place] [address], [address] [place] [address] authority and the [employer] Act. Patient identification was verified, and a caregiver was present when appropriate. The patient was located in a state where the provider was licensed to provide care. Patient identification was verified at the start of the visit, including the patient's telephone number and physical location. I discussed with the patient the nature of our telehealth visits, that:     Due to the nature of an audio- video modality, the only components of a physical exam that could be done are the elements supported by direct observation. I would evaluate the patient and recommend diagnostics and treatments based on my assessment. If it was felt that the patient should be evaluated in clinic or an emergency room setting, then they would be directed there. Our sessions are not being recorded and that personal health information is protected. Our team would provide follow up care in person if/when the patient needs it.            Patient's location: home address in [facility]  Physician  location home address in [facility] other people involved in call  Are none      On this date 3/23/2022 I have spent 30  minutes reviewing previous notes, test results and face to face (virtual) with the patient discussing the diagnosis and importance of compliance with the treatment plan as well as documenting on the day of the visit. ,     This visit was completed virtually using Doxy. me  \The patient is here for a medication list and treatment planning review  We will go over our care planning goals as well as take care of all refills  We will set up labs as well           Sinusitis  This is a new problem. The current episode started in the past 7 days. The problem has been gradually worsening since onset. Associated symptoms include sinus pressure. Pertinent negatives include no chills, congestion, coughing, diaphoresis, ear pain, headaches, hoarse voice, neck pain, shortness of breath, sneezing, sore throat or swollen glands. Hypertension  This is a chronic problem. The current episode started more than 1 year ago. Associated symptoms include malaise/fatigue. Pertinent negatives include no anxiety, blurred vision, chest pain, headaches, neck pain, orthopnea, palpitations, peripheral edema, PND, shortness of breath or sweats. Risk factors for coronary artery disease include post-menopausal state. Past treatments include lifestyle changes and beta blockers. Compliance problems include diet and exercise. Hypertensive end-organ damage includes CAD/MI (atrial fibrillation). There is no history of angina, kidney disease, CVA, heart failure, left ventricular hypertrophy, PVD or retinopathy. There is no history of chronic renal disease, coarctation of the aorta, hyperaldosteronism, hypercortisolism, hyperparathyroidism, a hypertension causing med, pheochromocytoma or renovascular disease. ROS:  Review of Systems   Constitutional: Positive for malaise/fatigue. Negative for activity change, appetite change, chills, diaphoresis and unexpected weight change. HENT: Positive for sinus pressure. Negative for congestion, dental problem, drooling, ear pain, facial swelling, hoarse voice, nosebleeds, postnasal drip, sinus pain, sneezing, sore throat, tinnitus, trouble swallowing and voice change. Eyes: Negative for blurred vision and visual disturbance. Respiratory: Negative. Negative for apnea, cough, choking, chest tightness and shortness of breath. Cardiovascular: Negative. Negative for chest pain, palpitations, orthopnea, leg swelling and PND. Gastrointestinal: Negative. Negative for abdominal distention, anal bleeding, blood in stool and rectal pain. Endocrine: Negative. Negative for cold intolerance, heat intolerance, polydipsia, polyphagia and polyuria. Genitourinary: Negative. Negative for decreased urine volume, difficulty urinating, dysuria, enuresis, flank pain, frequency, genital sores, hematuria, menstrual problem, pelvic pain and urgency. Musculoskeletal: Negative for back pain, gait problem, neck pain and neck stiffness. Skin: Negative. Negative for color change, pallor and wound. Allergic/Immunologic: Negative. Negative for environmental allergies, food allergies and immunocompromised state. Neurological: Negative for dizziness, tremors, seizures, syncope, facial asymmetry, speech difficulty, light-headedness and headaches. Hematological: Negative for adenopathy. Bruises/bleeds easily. Psychiatric/Behavioral: Negative for agitation, behavioral problems, confusion, decreased concentration, dysphoric mood, hallucinations, self-injury, sleep disturbance and suicidal ideas. The patient is nervous/anxious. The patient is not hyperactive.          Past Medical/Surgical Hx;  Reviewed with patient      Diagnosis Date    Atrial fibrillation ([facility] [address]     Coronary heart disease 2013    Endometrial hyperplasia     Pelvic mass     Primary osteoarthritis of right shoulder 2019     Past Surgical History:   Procedure Laterality Date     SECTION      Future  Long talk on treatment and prevention  Literature is given       IFG (impaired fasting glucose)  -     Comprehensive Metabolic Panel; Future  -     CBC with Auto Differential; Future  -     Hemoglobin A1C; Future    Dyslipidemia  -     Comprehensive Metabolic Panel; Future  -     Lipid Panel; Future  -     CBC with Auto Differential; Future    Other orders  -     tobramycin-dexamethasone (TOBRADEX) 0.3-0.1 % ophthalmic suspension; Place 1 drop into the right eye every 4 hours (while awake) for 10 days        Outpatient Encounter Medications as of 3/23/2022   Medication Sig Dispense Refill    tobramycin-dexamethasone (TOBRADEX) 0.3-0.1 % ophthalmic suspension Place 1 drop into the right eye every 4 hours (while awake) for 10 days 1 each 0    warfarin (COUMADIN) 2.5 MG tablet Take one tablet daily or as directed 90 tablet 0    warfarin (COUMADIN) 2.5 MG tablet TAKE 1 TABLET BY MOUTH DAILY OR AS DIRECTED 90 tablet 1    sotalol (BETAPACE) 120 MG tablet TAKE 1 TABLET BY MOUTH TWICE DAILY 180 tablet 1    ibuprofen (ADVIL;MOTRIN) 600 MG tablet Take 1 tablet by mouth 4 times daily as needed for Pain 40 tablet 0    vitamin D (CHOLECALCIFEROL) 50 MCG (2000 UT) TABS tablet Take 2,000 Units by mouth daily Takes 5 times a week      COD LIVER OIL PO Take by mouth Takes 5 times a week      Potassium Gluconate 595 MG CAPS Take by mouth three times a week      MAGNESIUM-ZINC PO Take by mouth four times a week      Ascorbic Acid (VITAMIN C PO) Take by mouth daily Chewable      torsemide (DEMADEX) 10 MG tablet TAKE 1 TABLET BY MOUTH DAILY 90 tablet 1    baclofen (LIORESAL) 10 MG tablet Take 1 tablet by mouth every evening 90 tablet 0     No facility-administered encounter medications on file as of 3/23/2022. No follow-ups on file.         Reviewed recent labs related to Shira León current problems      Discussed importance of regular Health Maintenance follow up  Health Maintenance   Topic    Hepatitis C screen  HIV screen     DTaP/Tdap/Td vaccine (1 - Tdap)    Colorectal Cancer Screen     Shingles Vaccine (1 of 2)    DEXA (modify frequency per FRAX score)     Flu vaccine (1)    Pneumococcal 65+ years Vaccine (1 of 1 - PPSV23)    A1C test (Diabetic or Prediabetic)     Potassium monitoring     Creatinine monitoring     Depression Screen     Breast cancer screen     Lipid screen     Cervical cancer screen     COVID-19 Vaccine     Hepatitis A vaccine     Hepatitis B vaccine     Hib vaccine     Meningococcal (ACWY) vaccine

## 2022-05-02 ENCOUNTER — NURSE ONLY (OUTPATIENT)
Dept: FAMILY MEDICINE CLINIC | Age: 67
End: 2022-05-02
Payer: MEDICARE

## 2022-05-02 DIAGNOSIS — I48.20 CHRONIC ATRIAL FIBRILLATION (HCC): ICD-10-CM

## 2022-05-02 DIAGNOSIS — I48.91 ATRIAL FIBRILLATION, UNSPECIFIED TYPE (HCC): Primary | ICD-10-CM

## 2022-05-02 LAB
INTERNATIONAL NORMALIZATION RATIO, POC: 3.1
PROTHROMBIN TIME, POC: 37

## 2022-05-02 PROCEDURE — 85610 PROTHROMBIN TIME: CPT | Performed by: FAMILY MEDICINE

## 2022-05-02 RX ORDER — WARFARIN SODIUM 2.5 MG/1
TABLET ORAL
Qty: 90 TABLET | Refills: 0 | Status: SHIPPED | OUTPATIENT
Start: 2022-05-02

## 2022-05-03 NOTE — PROGRESS NOTES
This MA attempted to reach pt. No answer. This MA left message for pt requesting return call to discuss INR results and new coumadin dose.     Electronically signed by Talon Flores MA on 5/3/22 at 6:22 PM EDT

## 2022-05-04 NOTE — PROGRESS NOTES
This MA attempted to reach pt. No answer. This MA left message for pt requesting return call to discuss results.     Electronically signed by Karen Gifford MA on 5/4/22 at 2:49 PM EDT

## 2022-05-06 NOTE — PROGRESS NOTES
This MA spoke with pt. Pt advised of new coumadin dose and recheck date.  Pt scheduled for recheck 5/16 at 1:50pm.    Electronically signed by Manjinder Russell MA on 5/6/22 at 9:02 AM EDT

## 2022-05-09 ENCOUNTER — TELEPHONE (OUTPATIENT)
Dept: ADMINISTRATIVE | Age: 67
End: 2022-05-09

## 2022-05-16 ENCOUNTER — NURSE ONLY (OUTPATIENT)
Dept: FAMILY MEDICINE CLINIC | Age: 67
End: 2022-05-16
Payer: MEDICARE

## 2022-05-16 DIAGNOSIS — I48.91 ATRIAL FIBRILLATION, UNSPECIFIED TYPE (HCC): Primary | ICD-10-CM

## 2022-05-16 LAB
INTERNATIONAL NORMALIZATION RATIO, POC: 3.4
PROTHROMBIN TIME, POC: 40.8

## 2022-05-16 PROCEDURE — 85610 PROTHROMBIN TIME: CPT | Performed by: FAMILY MEDICINE

## 2022-05-16 NOTE — PROGRESS NOTES
PT/INR done per Dr. Sandeep Johnson. Catterlin's orders.     Electronically signed by Venita Melchor MA on 5/16/22 at 2:05 PM EDT

## 2022-05-17 ENCOUNTER — OFFICE VISIT (OUTPATIENT)
Dept: ORTHOPEDIC SURGERY | Age: 67
End: 2022-05-17
Payer: MEDICARE

## 2022-05-17 DIAGNOSIS — M17.12 PRIMARY OSTEOARTHRITIS OF LEFT KNEE: Primary | ICD-10-CM

## 2022-05-17 PROCEDURE — 20610 DRAIN/INJ JOINT/BURSA W/O US: CPT | Performed by: ORTHOPAEDIC SURGERY

## 2022-05-17 NOTE — PROGRESS NOTES
This MA spoke with pt. Pt advised of new Coumadin dose and recheck date. Pt states that she will not be able to come until 5/26 due to her work schedule. Pt scheduled 5/26 for recheck INR.     Electronically signed by Leonel Stephens MA on 5/17/22 at 4:02 PM EDT

## 2022-05-17 NOTE — PROGRESS NOTES
Chief Complaint   Patient presents with    Injections        I will proceed with a cortisone injection in the Left knee. Verbal and written consent was obtained for the injections. The skin was prepped with alcohol. A prepared mixture of 32 mg of Zilretta and 5mL diluent was injected to Left knee. The injection was given through the lateral side of the knee. The patient tolerated the injection well. I will see the patient back prn. Libia Chapis was seen today for injections.     Diagnoses and all orders for this visit:    Primary osteoarthritis of left knee  -     DE ARTHROCENTESIS ASPIR&/INJ MAJOR JT/BURSA W/O US    Other orders  -     triamcinolone acetonide (ZILRETTA) intra-articular injection 32 mg

## 2022-05-17 NOTE — PROGRESS NOTES
This MA attempted to reach pt. No answer. This MA left message for pt requesting return call to discuss results.     Electronically signed by Rene Wooten MA on 5/17/22 at 12:41 PM EDT

## 2022-05-26 ENCOUNTER — NURSE ONLY (OUTPATIENT)
Dept: FAMILY MEDICINE CLINIC | Age: 67
End: 2022-05-26
Payer: MEDICARE

## 2022-05-26 DIAGNOSIS — I48.91 ATRIAL FIBRILLATION, UNSPECIFIED TYPE (HCC): Primary | ICD-10-CM

## 2022-05-26 LAB
INTERNATIONAL NORMALIZATION RATIO, POC: 1.5
PROTHROMBIN TIME, POC: NORMAL

## 2022-05-26 PROCEDURE — 85610 PROTHROMBIN TIME: CPT | Performed by: FAMILY MEDICINE

## 2022-05-26 NOTE — PROGRESS NOTES
Pt here for INR Per Dr. Andrez Vale.     Electronically signed by Raina Severin, MA on 5/26/22 at 8:46 AM EDT

## 2022-06-04 DIAGNOSIS — I10 ESSENTIAL HYPERTENSION: ICD-10-CM

## 2022-06-06 RX ORDER — TORSEMIDE 10 MG/1
TABLET ORAL
Qty: 90 TABLET | Refills: 1 | Status: SHIPPED | OUTPATIENT
Start: 2022-06-06

## 2022-07-05 DIAGNOSIS — I10 ESSENTIAL HYPERTENSION: ICD-10-CM

## 2022-07-05 DIAGNOSIS — I48.91 ATRIAL FIBRILLATION, UNSPECIFIED TYPE (HCC): ICD-10-CM

## 2022-07-05 DIAGNOSIS — I48.20 CHRONIC ATRIAL FIBRILLATION (HCC): ICD-10-CM

## 2022-07-05 RX ORDER — SOTALOL HYDROCHLORIDE 120 MG/1
TABLET ORAL
Qty: 180 TABLET | Refills: 1 | Status: SHIPPED | OUTPATIENT
Start: 2022-07-05

## 2022-08-25 ENCOUNTER — OFFICE VISIT (OUTPATIENT)
Dept: ORTHOPEDIC SURGERY | Age: 67
End: 2022-08-25
Payer: MEDICARE

## 2022-08-25 DIAGNOSIS — M17.12 PRIMARY OSTEOARTHRITIS OF LEFT KNEE: Primary | ICD-10-CM

## 2022-08-25 PROCEDURE — 99999 PR OFFICE/OUTPT VISIT,PROCEDURE ONLY: CPT | Performed by: ORTHOPAEDIC SURGERY

## 2022-08-25 PROCEDURE — 20610 DRAIN/INJ JOINT/BURSA W/O US: CPT | Performed by: ORTHOPAEDIC SURGERY

## 2022-08-25 NOTE — PROGRESS NOTES
Chief Complaint   Patient presents with    Injections          I will proceed with a cortisone injection in the Left knee. Verbal and written consent was obtained for the injections. The skin was prepped with alcohol. A prepared mixture of 32 mg of Zilretta and 5mL diluent was injected to Left knee. The injection was given through the lateral side of the knee. The patient tolerated the injection well. I will see the patient back prn. Micky Almodovar was seen today for injections.     Diagnoses and all orders for this visit:    Primary osteoarthritis of left knee  -     [place] ARTHROCENTESIS ASPIR&/INJ MAJOR JT/BURSA W/O US    Other orders  -     triamcinolone acetonide (ZILRETTA) intra-articular injection 32 mg

## 2022-11-21 ENCOUNTER — OFFICE VISIT (OUTPATIENT)
Dept: FAMILY MEDICINE CLINIC | Age: 67
End: 2022-11-21
Payer: MEDICARE

## 2022-11-21 VITALS
SYSTOLIC BLOOD PRESSURE: 136 MMHG | BODY MASS INDEX: 41.68 KG/M2 | OXYGEN SATURATION: 95 % | RESPIRATION RATE: 17 BRPM | TEMPERATURE: 97.3 F | HEART RATE: 88 BPM | WEIGHT: 275 LBS | HEIGHT: 68 IN | DIASTOLIC BLOOD PRESSURE: 88 MMHG

## 2022-11-21 DIAGNOSIS — G89.29 CHRONIC PAIN OF LEFT KNEE: ICD-10-CM

## 2022-11-21 DIAGNOSIS — R73.01 IFG (IMPAIRED FASTING GLUCOSE): ICD-10-CM

## 2022-11-21 DIAGNOSIS — M19.90 OSTEOARTHRITIS, UNSPECIFIED OSTEOARTHRITIS TYPE, UNSPECIFIED SITE: ICD-10-CM

## 2022-11-21 DIAGNOSIS — M25.50 ARTHRALGIA, UNSPECIFIED JOINT: ICD-10-CM

## 2022-11-21 DIAGNOSIS — M25.562 CHRONIC PAIN OF LEFT KNEE: ICD-10-CM

## 2022-11-21 DIAGNOSIS — I48.91 ATRIAL FIBRILLATION, UNSPECIFIED TYPE (HCC): ICD-10-CM

## 2022-11-21 DIAGNOSIS — R53.83 FATIGUE, UNSPECIFIED TYPE: ICD-10-CM

## 2022-11-21 DIAGNOSIS — I48.20 CHRONIC ATRIAL FIBRILLATION (HCC): ICD-10-CM

## 2022-11-21 DIAGNOSIS — I83.93 VARICOSE VEINS OF BOTH LOWER EXTREMITIES, UNSPECIFIED WHETHER COMPLICATED: ICD-10-CM

## 2022-11-21 DIAGNOSIS — M54.31 SCIATICA OF RIGHT SIDE: ICD-10-CM

## 2022-11-21 DIAGNOSIS — I10 PRIMARY HYPERTENSION: Primary | ICD-10-CM

## 2022-11-21 DIAGNOSIS — E78.2 MIXED HYPERLIPIDEMIA: ICD-10-CM

## 2022-11-21 DIAGNOSIS — I10 PRIMARY HYPERTENSION: ICD-10-CM

## 2022-11-21 LAB
ALBUMIN SERPL-MCNC: 3.9 G/DL (ref 3.5–5.2)
ALP BLD-CCNC: 63 U/L (ref 35–104)
ALT SERPL-CCNC: 11 U/L (ref 0–32)
ANION GAP SERPL CALCULATED.3IONS-SCNC: 9 MMOL/L (ref 7–16)
AST SERPL-CCNC: 18 U/L (ref 0–31)
BASOPHILS ABSOLUTE: 0.05 E9/L (ref 0–0.2)
BASOPHILS RELATIVE PERCENT: 0.7 % (ref 0–2)
BILIRUB SERPL-MCNC: 0.4 MG/DL (ref 0–1.2)
BUN BLDV-MCNC: 11 MG/DL (ref 6–23)
C-REACTIVE PROTEIN: 2.3 MG/DL (ref 0–0.4)
CALCIUM SERPL-MCNC: 9.5 MG/DL (ref 8.6–10.2)
CHLORIDE BLD-SCNC: 103 MMOL/L (ref 98–107)
CHOLESTEROL, TOTAL: 154 MG/DL (ref 0–199)
CO2: 28 MMOL/L (ref 22–29)
CREAT SERPL-MCNC: 0.7 MG/DL (ref 0.5–1)
EOSINOPHILS ABSOLUTE: 0.17 E9/L (ref 0.05–0.5)
EOSINOPHILS RELATIVE PERCENT: 2.5 % (ref 0–6)
GFR SERPL CREATININE-BSD FRML MDRD: >60 ML/MIN/1.73
GLUCOSE BLD-MCNC: 71 MG/DL (ref 74–99)
HBA1C MFR BLD: 5.7 % (ref 4–5.6)
HCT VFR BLD CALC: 40 % (ref 34–48)
HDLC SERPL-MCNC: 39 MG/DL
HEMOGLOBIN: 12.4 G/DL (ref 11.5–15.5)
IMMATURE GRANULOCYTES #: 0.01 E9/L
IMMATURE GRANULOCYTES %: 0.1 % (ref 0–5)
INTERNATIONAL NORMALIZATION RATIO, POC: 2.4
LDL CHOLESTEROL CALCULATED: 99 MG/DL (ref 0–99)
LYMPHOCYTES ABSOLUTE: 1.31 E9/L (ref 1.5–4)
LYMPHOCYTES RELATIVE PERCENT: 19.5 % (ref 20–42)
MCH RBC QN AUTO: 28.9 PG (ref 26–35)
MCHC RBC AUTO-ENTMCNC: 31 % (ref 32–34.5)
MCV RBC AUTO: 93.2 FL (ref 80–99.9)
MONOCYTES ABSOLUTE: 0.57 E9/L (ref 0.1–0.95)
MONOCYTES RELATIVE PERCENT: 8.5 % (ref 2–12)
NEUTROPHILS ABSOLUTE: 4.61 E9/L (ref 1.8–7.3)
NEUTROPHILS RELATIVE PERCENT: 68.7 % (ref 43–80)
PDW BLD-RTO: 13.6 FL (ref 11.5–15)
PLATELET # BLD: 276 E9/L (ref 130–450)
PMV BLD AUTO: 11.6 FL (ref 7–12)
POTASSIUM SERPL-SCNC: 4.6 MMOL/L (ref 3.5–5)
PROTHROMBIN TIME, POC: 29.2
RBC # BLD: 4.29 E12/L (ref 3.5–5.5)
RHEUMATOID FACTOR: 39 IU/ML (ref 0–13)
SEDIMENTATION RATE, ERYTHROCYTE: 26 MM/HR (ref 0–20)
SODIUM BLD-SCNC: 140 MMOL/L (ref 132–146)
TOTAL PROTEIN: 6.9 G/DL (ref 6.4–8.3)
TRIGL SERPL-MCNC: 82 MG/DL (ref 0–149)
TSH SERPL DL<=0.05 MIU/L-ACNC: 1.73 UIU/ML (ref 0.27–4.2)
VLDLC SERPL CALC-MCNC: 16 MG/DL
WBC # BLD: 6.7 E9/L (ref 4.5–11.5)

## 2022-11-21 PROCEDURE — 99213 OFFICE O/P EST LOW 20 MIN: CPT | Performed by: FAMILY MEDICINE

## 2022-11-21 PROCEDURE — 3074F SYST BP LT 130 MM HG: CPT | Performed by: FAMILY MEDICINE

## 2022-11-21 PROCEDURE — 3017F COLORECTAL CA SCREEN DOC REV: CPT | Performed by: FAMILY MEDICINE

## 2022-11-21 PROCEDURE — 85610 PROTHROMBIN TIME: CPT | Performed by: FAMILY MEDICINE

## 2022-11-21 PROCEDURE — G8417 CALC BMI ABV UP PARAM F/U: HCPCS | Performed by: FAMILY MEDICINE

## 2022-11-21 PROCEDURE — 1090F PRES/ABSN URINE INCON ASSESS: CPT | Performed by: FAMILY MEDICINE

## 2022-11-21 PROCEDURE — 1123F ACP DISCUSS/DSCN MKR DOCD: CPT | Performed by: FAMILY MEDICINE

## 2022-11-21 PROCEDURE — 1036F TOBACCO NON-USER: CPT | Performed by: FAMILY MEDICINE

## 2022-11-21 PROCEDURE — 3078F DIAST BP <80 MM HG: CPT | Performed by: FAMILY MEDICINE

## 2022-11-21 PROCEDURE — G8484 FLU IMMUNIZE NO ADMIN: HCPCS | Performed by: FAMILY MEDICINE

## 2022-11-21 PROCEDURE — G8400 PT W/DXA NO RESULTS DOC: HCPCS | Performed by: FAMILY MEDICINE

## 2022-11-21 PROCEDURE — G8427 DOCREV CUR MEDS BY ELIG CLIN: HCPCS | Performed by: FAMILY MEDICINE

## 2022-11-21 RX ORDER — DEXAMETHASONE SODIUM PHOSPHATE 4 MG/ML
4 INJECTION, SOLUTION INTRA-ARTICULAR; INTRALESIONAL; INTRAMUSCULAR; INTRAVENOUS; SOFT TISSUE ONCE
Qty: 1 ML | Refills: 0
Start: 2022-11-21 | End: 2022-11-21

## 2022-11-21 RX ORDER — WARFARIN SODIUM 2.5 MG/1
TABLET ORAL
Qty: 90 TABLET | Refills: 0 | Status: SHIPPED | OUTPATIENT
Start: 2022-11-21

## 2022-11-21 ASSESSMENT — PATIENT HEALTH QUESTIONNAIRE - PHQ9
SUM OF ALL RESPONSES TO PHQ QUESTIONS 1-9: 0
SUM OF ALL RESPONSES TO PHQ9 QUESTIONS 1 & 2: 0
1. LITTLE INTEREST OR PLEASURE IN DOING THINGS: 0
SUM OF ALL RESPONSES TO PHQ QUESTIONS 1-9: 0
2. FEELING DOWN, DEPRESSED OR HOPELESS: 0

## 2022-11-22 ENCOUNTER — TELEPHONE (OUTPATIENT)
Dept: VASCULAR SURGERY | Age: 67
End: 2022-11-22

## 2022-11-22 LAB — ANTI-NUCLEAR ANTIBODY (ANA): NEGATIVE

## 2022-11-22 NOTE — TELEPHONE ENCOUNTER
Received referral from Dr. Zeke Mcdonald for varicose veins, left message for patient to schedule appointment to see Dr. Yokasta Huggins.

## 2022-11-23 PROBLEM — M25.50 ARTHRALGIA: Status: ACTIVE | Noted: 2022-11-23

## 2022-11-23 PROBLEM — E78.2 MIXED HYPERLIPIDEMIA: Status: ACTIVE | Noted: 2022-11-23

## 2022-11-23 PROBLEM — M54.31 SCIATICA OF RIGHT SIDE: Status: ACTIVE | Noted: 2022-11-23

## 2022-11-23 ASSESSMENT — ENCOUNTER SYMPTOMS
GASTROINTESTINAL NEGATIVE: 1
SORE THROAT: 0
EYE DISCHARGE: 0
RECTAL PAIN: 0
SHORTNESS OF BREATH: 0
COLOR CHANGE: 0
CONSTIPATION: 0
BLOOD IN STOOL: 0
STRIDOR: 0
ALLERGIC/IMMUNOLOGIC NEGATIVE: 1
RESPIRATORY NEGATIVE: 1
ANAL BLEEDING: 0
RHINORRHEA: 0
EYE ITCHING: 0
SINUS PRESSURE: 0
COUGH: 0
EYE REDNESS: 0
ABDOMINAL DISTENTION: 0
SINUS PAIN: 0
APNEA: 0
ABDOMINAL PAIN: 0
TROUBLE SWALLOWING: 0
CHEST TIGHTNESS: 0
CHOKING: 0
WHEEZING: 0
BACK PAIN: 1
VOICE CHANGE: 0
FACIAL SWELLING: 0
EYE PAIN: 0
NAUSEA: 0
EYES NEGATIVE: 1
PHOTOPHOBIA: 0
VOMITING: 0
DIARRHEA: 0

## 2022-11-27 LAB
Lab: NORMAL
REPORT: NORMAL
THIS TEST SENT TO: NORMAL

## 2022-11-29 ENCOUNTER — OFFICE VISIT (OUTPATIENT)
Dept: ORTHOPEDIC SURGERY | Age: 67
End: 2022-11-29
Payer: MEDICARE

## 2022-11-29 DIAGNOSIS — M17.12 PRIMARY OSTEOARTHRITIS OF LEFT KNEE: Primary | ICD-10-CM

## 2022-11-29 PROCEDURE — 20610 DRAIN/INJ JOINT/BURSA W/O US: CPT | Performed by: ORTHOPAEDIC SURGERY

## 2022-11-29 PROCEDURE — 99999 PR OFFICE/OUTPT VISIT,PROCEDURE ONLY: CPT | Performed by: ORTHOPAEDIC SURGERY

## 2022-11-29 NOTE — PROGRESS NOTES
Chief Complaint   Patient presents with    Injections          I will proceed with a cortisone injection in the Left knee. Verbal and written consent was obtained for the injections. The skin was prepped with alcohol. A prepared mixture of 32 mg of Zilretta and 5mL diluent was injected to Left knee. The injection was given through the lateral side of the knee. The patient tolerated the injection well. I will see the patient back prn. Trina Jade was seen today for injections.     Diagnoses and all orders for this visit:    Primary osteoarthritis of left knee  -     MI ARTHROCENTESIS ASPIR&/INJ MAJOR JT/BURSA W/O US    Other orders  -     triamcinolone acetonide (ZILRETTA) intra-articular injection 32 mg

## 2022-12-01 ENCOUNTER — TELEPHONE (OUTPATIENT)
Dept: VASCULAR SURGERY | Age: 67
End: 2022-12-01

## 2022-12-01 DIAGNOSIS — M19.90 OSTEOARTHRITIS, UNSPECIFIED OSTEOARTHRITIS TYPE, UNSPECIFIED SITE: Primary | ICD-10-CM

## 2022-12-14 ENCOUNTER — TELEMEDICINE (OUTPATIENT)
Dept: FAMILY MEDICINE CLINIC | Age: 67
End: 2022-12-14
Payer: MEDICARE

## 2022-12-14 DIAGNOSIS — E78.2 MIXED HYPERLIPIDEMIA: ICD-10-CM

## 2022-12-14 DIAGNOSIS — J40 BRONCHITIS: ICD-10-CM

## 2022-12-14 DIAGNOSIS — I48.0 PAROXYSMAL ATRIAL FIBRILLATION (HCC): Primary | ICD-10-CM

## 2022-12-14 DIAGNOSIS — I10 PRIMARY HYPERTENSION: ICD-10-CM

## 2022-12-14 PROCEDURE — 3017F COLORECTAL CA SCREEN DOC REV: CPT | Performed by: FAMILY MEDICINE

## 2022-12-14 PROCEDURE — 1036F TOBACCO NON-USER: CPT | Performed by: FAMILY MEDICINE

## 2022-12-14 PROCEDURE — G8427 DOCREV CUR MEDS BY ELIG CLIN: HCPCS | Performed by: FAMILY MEDICINE

## 2022-12-14 PROCEDURE — 99213 OFFICE O/P EST LOW 20 MIN: CPT | Performed by: FAMILY MEDICINE

## 2022-12-14 PROCEDURE — G8484 FLU IMMUNIZE NO ADMIN: HCPCS | Performed by: FAMILY MEDICINE

## 2022-12-14 PROCEDURE — 1123F ACP DISCUSS/DSCN MKR DOCD: CPT | Performed by: FAMILY MEDICINE

## 2022-12-14 PROCEDURE — 1090F PRES/ABSN URINE INCON ASSESS: CPT | Performed by: FAMILY MEDICINE

## 2022-12-14 PROCEDURE — G8400 PT W/DXA NO RESULTS DOC: HCPCS | Performed by: FAMILY MEDICINE

## 2022-12-14 PROCEDURE — G8417 CALC BMI ABV UP PARAM F/U: HCPCS | Performed by: FAMILY MEDICINE

## 2022-12-14 RX ORDER — DILTIAZEM HYDROCHLORIDE 60 MG/1
2 TABLET, FILM COATED ORAL 2 TIMES DAILY
Qty: 10.2 G | Refills: 3
Start: 2022-12-14

## 2022-12-14 RX ORDER — GUAIFENESIN 600 MG/1
600 TABLET, EXTENDED RELEASE ORAL 2 TIMES DAILY
Qty: 30 TABLET | Refills: 0 | Status: SHIPPED | OUTPATIENT
Start: 2022-12-14 | End: 2022-12-29

## 2022-12-14 RX ORDER — METHYLPREDNISOLONE 4 MG/1
TABLET ORAL
Qty: 1 KIT | Refills: 0 | Status: SHIPPED | OUTPATIENT
Start: 2022-12-14 | End: 2022-12-20

## 2022-12-14 ASSESSMENT — ENCOUNTER SYMPTOMS
COLOR CHANGE: 0
NAUSEA: 0
ABDOMINAL PAIN: 0
EYE PAIN: 0
CONSTIPATION: 0
WHEEZING: 0
BACK PAIN: 1
BLOOD IN STOOL: 0
SORE THROAT: 0
SINUS PRESSURE: 1
DIARRHEA: 0
EYE ITCHING: 0
TROUBLE SWALLOWING: 0
VOICE CHANGE: 0
COUGH: 1
SINUS PAIN: 0
GASTROINTESTINAL NEGATIVE: 1
CHOKING: 0
RECTAL PAIN: 0
CHEST TIGHTNESS: 0
ANAL BLEEDING: 0
SHORTNESS OF BREATH: 1
ALLERGIC/IMMUNOLOGIC NEGATIVE: 1
STRIDOR: 0
RHINORRHEA: 0
EYES NEGATIVE: 1
BLURRED VISION: 0
EYE REDNESS: 0
FACIAL SWELLING: 0
VOMITING: 0
APNEA: 0
ABDOMINAL DISTENTION: 0
ORTHOPNEA: 0
EYE DISCHARGE: 0
PHOTOPHOBIA: 0

## 2022-12-14 NOTE — PROGRESS NOTES
SUBJECTIVE  Janett Luque is a 78 y.o. female. HPI/Chief C/O:  Chief Complaint   Patient presents with    Wheezing     Pt states she feels a wheezing feeling in her chest when she is breathing. Denies a cough, denies any other symptoms. Pt states she noticed this since Tuesday. Other     Pt gives verbal consent for vv is in [facility]. No Known Allergies  TeleMedicine Video Visit    Janett Luque, was evaluated through a synchronous (real-time) audio-video encounter. The patient (or guardian if applicable) is aware that this is a billable service. , which includes applicable co-pays. This virtual visit was conducted with the patient's  (and/or legal guardian's) consent. The visit was conducted pursuant to the emergency declaration under the [address] [place] [address], [address] [place] [address] authority and the [employer] Act. Patient identification was verified, and a caregiver was present when appropriate. The patient was located in a state where the provider was licensed to provide care. Patient identification was verified at the start of the visit, including the patient's telephone number and physical location. I discussed with the patient the nature of our telehealth visits, that:     Due to the nature of an audio- video modality, the only components of a physical exam that could be done are the elements supported by direct observation. I would evaluate the patient and recommend diagnostics and treatments based on my assessment. If it was felt that the patient should be evaluated in clinic or an emergency room setting, then they would be directed there. Our sessions are not being recorded and that personal health information is protected. Our team would provide follow up care in person if/when the patient needs it.            Patient's location: home address in [facility]  Physician  location home address in [facility] other people involved in call  are none       On this date 12/14/2022 I have spent 25  minutes reviewing previous notes, test results and face to face (virtual) with the patient discussing the diagnosis and importance of compliance with the treatment plan as well as documenting on the day of the visit. ,     This visit was completed virtually using Doxy. me        The patient is here for a medication list and treatment planning review  We will go over our care planning goals as well as take care of all refills  We will set up labs as well      C/O some sinus congestion and a cough     Hypertension  This is a chronic problem. The current episode started more than 1 year ago. Associated symptoms include malaise/fatigue and shortness of breath. Pertinent negatives include no anxiety, blurred vision, chest pain, headaches, neck pain, orthopnea, palpitations, peripheral edema, PND or sweats. Risk factors for coronary artery disease include post-menopausal state. Past treatments include lifestyle changes, beta blockers and diuretics. Compliance problems include diet and exercise. Hypertensive end-organ damage includes CAD/MI (atrial fibrillation). There is no history of angina, kidney disease, CVA, heart failure, left ventricular hypertrophy, PVD or retinopathy. There is no history of chronic renal disease, coarctation of the aorta, hyperaldosteronism, hypercortisolism, hyperparathyroidism, a hypertension causing med, pheochromocytoma, renovascular disease, sleep apnea or a thyroid problem. ROS:  Review of Systems   Constitutional:  Positive for fatigue and malaise/fatigue. Negative for activity change, appetite change, chills, diaphoresis, fever and unexpected weight change. HENT:  Positive for congestion and sinus pressure.  Negative for dental problem, drooling, ear discharge, ear pain, facial swelling, hearing loss, mouth sores, nosebleeds, postnasal drip, rhinorrhea, sinus pain, sneezing, sore throat, tinnitus, trouble swallowing and voice change. Eyes: Negative. Negative for blurred vision, photophobia, pain, discharge, redness, itching and visual disturbance. Respiratory:  Positive for cough and shortness of breath. Negative for apnea, choking, chest tightness, wheezing and stridor. Cardiovascular: Negative. Negative for chest pain, palpitations, orthopnea, leg swelling and PND. Gastrointestinal: Negative. Negative for abdominal distention, abdominal pain, anal bleeding, blood in stool, constipation, diarrhea, nausea, rectal pain and vomiting. Endocrine: Negative. Negative for cold intolerance, heat intolerance, polydipsia, polyphagia and polyuria. Genitourinary: Negative. Negative for decreased urine volume, difficulty urinating, dysuria, enuresis, flank pain, frequency, genital sores, hematuria, menstrual problem, pelvic pain and urgency. Musculoskeletal:  Positive for arthralgias, back pain and myalgias. Negative for gait problem, joint swelling, neck pain and neck stiffness. Skin: Negative. Negative for color change, pallor, rash and wound. Allergic/Immunologic: Negative. Negative for environmental allergies, food allergies and immunocompromised state. Neurological: Negative. Negative for dizziness, tremors, seizures, syncope, facial asymmetry, speech difficulty, weakness, light-headedness, numbness and headaches. Hematological:  Negative for adenopathy. Bruises/bleeds easily. Psychiatric/Behavioral:  Negative for agitation, behavioral problems, confusion, decreased concentration, dysphoric mood, hallucinations, self-injury, sleep disturbance and suicidal ideas. The patient is nervous/anxious. The patient is not hyperactive.        Past Medical/Surgical Hx;  Reviewed with patient      Diagnosis Date    Atrial fibrillation [facility])     Coronary heart disease 11/13/2013    Endometrial hyperplasia     Mixed hyperlipidemia 11/23/2022    Pelvic mass     Primary osteoarthritis of right shoulder 5/13/2019    Sciatica of right side 2022     Past Surgical History:   Procedure Laterality Date     SECTION      DILATION AND CURETTAGE OF UTERUS      ECHO COMPL W DOP COLOR FLOW  2013         HYSTERECTOMY (CERVIX STATUS UNKNOWN)  10/12/2021    TONSILLECTOMY         Past Family Hx:  Reviewed with patient      Problem Relation Age of Onset    Breast Cancer Maternal Grandmother     Breast Cancer Maternal Aunt     Ovarian Cancer Neg Hx     Colon Cancer Neg Hx        Social Hx:  Reviewed with patient  Social History     Tobacco Use    Smoking status: Former     Packs/day: 2.00     Years: 15.00     Pack years: 30.00     Types: Cigarettes     Start date: 1972     Quit date: 1987     Years since quittin.9    Smokeless tobacco: Never   Substance Use Topics    Alcohol use: No       OBJECTIVE  LMP  (LMP Unknown)     Problem List:  Kandy Corcoran does not have any pertinent problems on file. PHYS EX:  General: Awake/Alert/Oriented/No Acute Distress      ASSESSMENT/PLAN  Kandy Corcoran was seen today for wheezing and other. Diagnoses and all orders for this visit:    Paroxysmal atrial fibrillation (HCC)    ---VASCULAR PANEL  A) asa, plavix, aggrenox  B) COUMADIN, pletal, tzd, statin  C) ace, DEMADEX, folic, ccb  D) cannikinumab, fish oils     ---CARDIAC---COUMADIN, ace, BETA, statin, DEMADEX, ( ccb )     Bronchitis  -     methylPREDNISolone (MEDROL DOSEPACK) 4 MG tablet; Take as directed  -     SYMBICORT 80-4.5 MCG/ACT AERO; Inhale 2 puffs into the lungs 2 times daily  -     guaiFENesin (MUCINEX) 600 MG extended release tablet;  Take 1 tablet by mouth 2 times daily for 15 days  Take tylenol every 6 hours as needed for temperature, aches and pain  Hydrate with 40 to 50 oz of fluids  I have sent medication in for you  Please keep in touch with me and let me know how you are doing  If you get worse, call as soon as possible or seek immediate medical attention       Primary hypertension  --patient is instructed on low to moderate sodium ( 2 to 2.5 grams ), daily    Also to increase potassium in the diet to about 3.5 grams daily    Literature is provided       Mixed hyperlipidemia  --Mediterranean diet, exercise, weight loss, vitamins    We have a long talk on cholesterol and importance of lowering it         Outpatient Encounter Medications as of 12/14/2022   Medication Sig Dispense Refill    methylPREDNISolone (MEDROL DOSEPACK) 4 MG tablet Take as directed 1 kit 0    SYMBICORT 80-4.5 MCG/ACT AERO Inhale 2 puffs into the lungs 2 times daily 10.2 g 3    guaiFENesin (MUCINEX) 600 MG extended release tablet Take 1 tablet by mouth 2 times daily for 15 days 30 tablet 0    warfarin (COUMADIN) 2.5 MG tablet Take one tablet daily or as directed 90 tablet 0    sotalol (BETAPACE) 120 MG tablet TAKE 1 TABLET BY MOUTH TWICE DAILY 180 tablet 1    torsemide (DEMADEX) 10 MG tablet TAKE 1 TABLET BY MOUTH DAILY 90 tablet 1    vitamin D (CHOLECALCIFEROL) 50 MCG (2000 UT) TABS tablet Take 2,000 Units by mouth daily Takes 5 times a week      Potassium Gluconate 595 MG CAPS Take by mouth three times a week      MAGNESIUM-ZINC PO Take by mouth four times a week      Ascorbic Acid (VITAMIN C PO) Take by mouth daily Chewable      baclofen (LIORESAL) 10 MG tablet Take 1 tablet by mouth every evening 90 tablet 0     No facility-administered encounter medications on file as of 12/14/2022. No follow-ups on file.         Reviewed recent labs related to [address] [place] [address] current problems      Discussed importance of regular Health Maintenance follow up  Health Maintenance   Topic    Hepatitis C screen     DTaP/Tdap/Td vaccine (1 - Tdap)    Colorectal Cancer Screen     Shingles vaccine (1 of 2)    DEXA (modify frequency per FRAX score)     Pneumococcal 65+ years Vaccine (1 - PCV)    Flu vaccine (1)    Annual Wellness Visit (AWV)     Breast cancer screen     A1C test (Diabetic or Prediabetic)     Depression Screen     Lipids     COVID-19 Vaccine     Hepatitis A vaccine     Hib vaccine     Meningococcal (ACWY) vaccine

## 2023-01-06 DIAGNOSIS — I10 ESSENTIAL HYPERTENSION: ICD-10-CM

## 2023-01-06 DIAGNOSIS — I48.20 CHRONIC ATRIAL FIBRILLATION (HCC): ICD-10-CM

## 2023-01-06 DIAGNOSIS — I48.91 ATRIAL FIBRILLATION, UNSPECIFIED TYPE (HCC): ICD-10-CM

## 2023-01-06 RX ORDER — SOTALOL HYDROCHLORIDE 120 MG/1
TABLET ORAL
Qty: 180 TABLET | Refills: 1 | Status: SHIPPED | OUTPATIENT
Start: 2023-01-06

## 2023-01-11 ENCOUNTER — TELEPHONE (OUTPATIENT)
Dept: VASCULAR SURGERY | Age: 68
End: 2023-01-11

## 2023-01-12 ENCOUNTER — OFFICE VISIT (OUTPATIENT)
Dept: VASCULAR SURGERY | Age: 68
End: 2023-01-12
Payer: MEDICARE

## 2023-01-12 VITALS — HEIGHT: 68 IN | BODY MASS INDEX: 39.4 KG/M2 | WEIGHT: 260 LBS

## 2023-01-12 DIAGNOSIS — I87.2 CHRONIC VENOUS INSUFFICIENCY: ICD-10-CM

## 2023-01-12 DIAGNOSIS — M79.89 LEG SWELLING: ICD-10-CM

## 2023-01-12 DIAGNOSIS — I83.893 VARICOSE VEINS OF BILATERAL LOWER EXTREMITIES WITH OTHER COMPLICATIONS: ICD-10-CM

## 2023-01-12 PROCEDURE — 1123F ACP DISCUSS/DSCN MKR DOCD: CPT | Performed by: SURGERY

## 2023-01-12 PROCEDURE — G8427 DOCREV CUR MEDS BY ELIG CLIN: HCPCS | Performed by: SURGERY

## 2023-01-12 PROCEDURE — G8484 FLU IMMUNIZE NO ADMIN: HCPCS | Performed by: SURGERY

## 2023-01-12 PROCEDURE — 1036F TOBACCO NON-USER: CPT | Performed by: SURGERY

## 2023-01-12 PROCEDURE — G8400 PT W/DXA NO RESULTS DOC: HCPCS | Performed by: SURGERY

## 2023-01-12 PROCEDURE — 99204 OFFICE O/P NEW MOD 45 MIN: CPT | Performed by: SURGERY

## 2023-01-12 PROCEDURE — G8417 CALC BMI ABV UP PARAM F/U: HCPCS | Performed by: SURGERY

## 2023-01-12 PROCEDURE — 1090F PRES/ABSN URINE INCON ASSESS: CPT | Performed by: SURGERY

## 2023-01-12 PROCEDURE — 3017F COLORECTAL CA SCREEN DOC REV: CPT | Performed by: SURGERY

## 2023-01-12 NOTE — PROGRESS NOTES
Chief Complaint:   Chief Complaint   Patient presents with    Circulatory Problem     New pt. Bilateral lower extremities pain         HPI: Patient came to the office, for the evaluation of vascular status of both legs, does give history of varicose veins, for at least 30 years, slightly slowly getting worse, with some swelling and discomfort underwent evaluation by her PCP who referred the patient here for further evaluation    Patient denies any history of deep vein thrombosis or superficial thrombophlebitis, currently on Coumadin, for atrial fibrillation      Patient denies any focal lateralizing neurological symptoms like loss of speech, vision or loss of function of extremity    Patient can walk a few blocks , and denies any symptoms of rest pain    No Known Allergies    Current Outpatient Medications   Medication Sig Dispense Refill    Elastic Bandages & Supports (JOBST KNEE HIGH COMPRESSION ) MISC Knee high with 20- 30 mmhg of compression 1 each 10    sotalol (BETAPACE) 120 MG tablet TAKE 1 TABLET BY MOUTH TWICE DAILY 180 tablet 1    SYMBICORT 80-4.5 MCG/ACT AERO Inhale 2 puffs into the lungs 2 times daily 10.2 g 3    warfarin (COUMADIN) 2.5 MG tablet Take one tablet daily or as directed 90 tablet 0    torsemide (DEMADEX) 10 MG tablet TAKE 1 TABLET BY MOUTH DAILY 90 tablet 1    vitamin D (CHOLECALCIFEROL) 50 MCG (2000 UT) TABS tablet Take 2,000 Units by mouth daily Takes 5 times a week      Potassium Gluconate 595 MG CAPS Take by mouth three times a week      MAGNESIUM-ZINC PO Take by mouth four times a week      Ascorbic Acid (VITAMIN C PO) Take by mouth daily Chewable      baclofen (LIORESAL) 10 MG tablet Take 1 tablet by mouth every evening 90 tablet 0     No current facility-administered medications for this visit.        Past Medical History:   Diagnosis Date    Atrial fibrillation [facility])     Chronic venous insufficiency 1/12/2023    Coronary heart disease 11/13/2013    Endometrial hyperplasia     Leg pain     Leg swelling 2023    Mixed hyperlipidemia 2022    Pelvic mass     Primary osteoarthritis of right shoulder 2019    Sciatica of right side 2022    Varicose veins of bilateral lower extremities with other complications     Varicose veins of both legs with edema        Past Surgical History:   Procedure Laterality Date     SECTION      DILATION AND CURETTAGE OF UTERUS      ECHO COMPL W DOP COLOR FLOW  2013         HYSTERECTOMY (CERVIX STATUS UNKNOWN)  10/12/2021    TONSILLECTOMY         Family History   Problem Relation Age of Onset    Breast Cancer Maternal Grandmother     Breast Cancer Maternal Aunt     Ovarian Cancer Neg Hx     Colon Cancer Neg Hx        Social History     Socioeconomic History    Marital status:      Spouse name: Not on file    Number of children: Not on file    Years of education: Not on file    Highest education level: Not on file   Occupational History    Not on file   Tobacco Use    Smoking status: Former     Packs/day: 2.00     Years: 15.00     Pack years: 30.00     Types: Cigarettes     Start date: 1972     Quit date: 1987     Years since quittin.0    Smokeless tobacco: Never   Substance and Sexual Activity    Alcohol use: No    Drug use: No    Sexual activity: Not on file   Other Topics Concern    Not on file   Social History Narrative    Not on file     Social Determinants of Health     Financial Resource Strain: Low Risk     Difficulty of Paying Living Expenses: Not hard at all   Food Insecurity: No Food Insecurity    Worried About Running Out of Food in the Last Year: Never true    Ran Out of Food in the Last Year: Never true   Transportation Needs: Not on file   Physical Activity: Not on file   Stress: Not on file   Social Connections: Not on file   Intimate Partner Violence: Not on file   Housing Stability: Not on file       Review of Systems:  Skin:  No abnormal pigmentation or rash  Eyes:  No blurring, diplopia or vision loss  Ears/Nose/Throat:  No hearing loss or vertigo  Respiratory:  No cough, pleuritic chest pain, dyspnea, or wheezing. History of asthma  Cardiovascular: No angina, palpitations . Coronary artery disease with history of atrial fibrillation, hypertension    gastrointestinal:  No nausea or vomiting; no abdominal pain or rectal bleeding  Musculoskeletal:  No arthritis or weakness. Neurologic:  No paralysis, paresis, paresthesia, seizures or headaches  Hematologic/Lymphatic/Immunologic:  No anemia, abnormal bleeding/bruising, fever, chills or night sweats. Endocrine:  No heat or cold intolerance. No polyphagia, polydipsia or polyuria. Physical Exam:  General appearance:  Alert, awake, oriented x 3. No distress. Skin:  Warm and dry  Head:  Normocephalic. No masses, lesions or tenderness  Eyes:  Conjunctivae appear normal; PERRL  Ears:  External ears normal  Nose/Sinuses:  Septum midline, mucosa normal; no drainage  Oropharynx:  Clear, no exudate noted  Neck:  No jugular venous distention, lymphadenopathy or thyromegaly. No evidence of carotid bruit  Lungs:  Clear to ausculation bilaterally. No rhonchi, crackles, wheezes  Heart:  Regular rate and rhythm. No rub or murmur  Abdomen:  Soft, non-tender. No masses, organomegaly. Musculoskeletal : No joint effusions, tenderness swelling    Neuro: Speech is intact. Moving all extremities. No focal motor or sensory deficits      Extremities:  Both feet are warm to touch.  The color of both feet is normal.    Mild swelling of both legs noted without any calf tenderness    Patient does have moderately severe varicose veins particularly over the lateral and posterior aspect of the thigh and the knee bilaterally    Patient does have chronic venous stasis pigmentation of mild to moderate severity about the ankles bilaterally      Pulses Right  Left    Brachial 3 3    Radial    3=normal   Femoral 2 2  2=diminished   Popliteal    1=barely palpable   Dorsalis pedis 3q 3  0=absent   Posterior tibial    4=aneurysmal             Other pertinent information:1. The past medical records were reviewed. Assessment:    1. Varicose veins of bilateral lower extremities with other complications    2. Leg swelling    3. Chronic venous insufficiency              Plan:       Discussed the patient regarding varicose veins, the natural history, slow gradual worsening, over many many months and years, for now, follow conservatively, with instruction to wear compression stockings, and also venous ultrasound studies as outlined below and call me as needed if any increase in the pain or swelling in the future          Patient was instructed to continue walking program and to call if any worsening of symptoms and to call if any focal lateralizing neurological symptoms like loss of speech, vision or loss of function of extremity. All the questions were answered. Orders Placed This Encounter   Procedures    US LOWER EXTREMITY BILATERAL VEIN MAPPING W DVT     Orders Placed This Encounter   Medications    Elastic Bandages & Supports (JOBST KNEE HIGH COMPRESSION ) MISC     Sig: Knee high with 20- 30 mmhg of compression     Dispense:  1 each     Refill:  10           Indicated follow-up: Return if symptoms worsen or fail to improve.

## 2023-01-24 DIAGNOSIS — J40 BRONCHITIS: ICD-10-CM

## 2023-01-24 RX ORDER — DILTIAZEM HYDROCHLORIDE 60 MG/1
2 TABLET, FILM COATED ORAL 2 TIMES DAILY
Qty: 10.2 G | Refills: 3 | Status: SHIPPED | OUTPATIENT
Start: 2023-01-24

## 2023-01-26 ENCOUNTER — TELEPHONE (OUTPATIENT)
Dept: VASCULAR SURGERY | Age: 68
End: 2023-01-26

## 2023-02-03 ENCOUNTER — HOSPITAL ENCOUNTER (OUTPATIENT)
Dept: CARDIOLOGY | Age: 68
Discharge: HOME | End: 2023-02-03
Payer: MEDICARE

## 2023-02-03 DIAGNOSIS — M79.89 LEG SWELLING: ICD-10-CM

## 2023-02-03 DIAGNOSIS — I87.2 CHRONIC VENOUS INSUFFICIENCY: ICD-10-CM

## 2023-02-03 DIAGNOSIS — I83.893 VARICOSE VEINS OF BILATERAL LOWER EXTREMITIES WITH OTHER COMPLICATIONS: ICD-10-CM

## 2023-02-03 PROCEDURE — 93970 EXTREMITY STUDY: CPT

## 2023-02-03 NOTE — PROGRESS NOTES
[facility] - [facility]    This is a pre read worksheet - prior to official physician interpretation    Tana Varner  1955  Date of study: 2/3/23    Indication for study:  Leg pain and swelling, varicose veins  Study : Bilateral Lower Extremity Venous Duplex and Reflux Examination    Duplex examination of the common, deep, superficial femoral, and the popliteal veins of the RIGHT lower extremity identifies spontaneous flow. All scanned veins are compressible and free of echogenic densities. There was no evidence of reflux in the right greater saphenous vein. The right greater saphenous vein measured 0.7 x 0.6 cm. Duplex examination of the common, deep, superficial femoral, and the popliteal veins of the LEFT lower extremity identifies spontaneous flow. All scanned veins are compressible and free of echogenic densities. There was no evidence of reflux in the left greater saphenous vein. The left greater saphenous vein measured 0.6 x 0.6 cm. Additional comments: Negative DVT. There was no evidence of reflux in the right lesser saphenous vein. The right lesser saphenous vein measured 0.5 x 0.5 cm. There was no evidence of reflux in the left lesser saphenous vein. The left lesser saphenous vein measured 0.5 x 0.6 cm.

## 2023-02-08 ENCOUNTER — TELEPHONE (OUTPATIENT)
Dept: VASCULAR SURGERY | Age: 68
End: 2023-02-08

## 2023-02-08 NOTE — TELEPHONE ENCOUNTER
Discussed with the patient regarding the venous ultrasound, essentially normal, no reflux, no DVT, for now recommend conservative therapy with compression stockings and call me as needed if symptoms worsen, all her questions were answered PREOPERATIVE DIAGNOSES:   1.  Right hamate body fracture.   2.  Right ring finger metacarpal base intra-articular fracture.   3.  Right ring and small finger carpometacarpal joint dislocations.      POSTOPERATIVE DIAGNOSIS:   1.  Right hamate body fracture.   2.  Right ring finger metacarpal base intra-articular fracture.   3.  Right ring and small finger carpometacarpal joint dislocations.      PROCEDURES PERFORMED:   1.  Open reduction and percutaneous pin stabilization of right ring and small finger dorsal carpometacarpal dislocations.   2.  Open reduction-internal fixation of right hamate body fracture.      SURGEON:  Payton Galvez MD   ASSISTANT:  Kell Rojas PA-C.  Ms. Rojas was present for patient positioning, prepping, draping and the entire surgical case, including wound closure.  Her assistance was necessary to aid in joint reduction and to ensure a safe and smooth progression of the case.      ANESTHESIA:  Right upper extremity regional block.   ESTIMATED BLOOD LOSS:  Less than 5 cc.   SPECIMENS:  None.   DRAINS:  None.   COMPLICATIONS:  None known.      FINDINGS:    A very thin wafer of dorsal cortex of the hamate body fracture, reduced and stabilized with    a single 1.3 mm Synthes screw from the modular handset.  Dorsal dislocations of the ring and small finger carpometacarpal joints, reduced and stable following percutaneous fixation.      INDICATIONS:  Elijah Zuluaga is an 19-year-old right-hand dominant male who sustained an injury to his right hand when he punched a tree.  This is his third injury to the hand as a result of punching.  He presented to my office with a displaced right hamate body fracture, dorsal dislocations of the ring and small finger carpometacarpal joints and an intra-articular fracture of the right ring finger metacarpal base.  Treatment options were discussed with the patient at length, including conservative and surgical options.  Given the joint dislocation I offered  and recommended surgery to improve the alignment.  The patient understands the risks and benefits of both conservative and surgical treatment.  He further understands the risks of surgery include, but are not limited to the following - Problems with anesthesia, infection, sensitive scars, wound healing problems, pin tract infection, damage to surrounding anatomical structures including nerves, vessels and/or tendons, bleeding, joint stiffness, failure or loss of joint or fracture reduction, persistent pain, complex regional pain syndrome, need for additional surgery despite our efforts today, problems associated with anesthesia including deep vein thrombus or pulmonary embolus, stroke, heart attack, loss of limb or life.  No guaranties were made or implied.  The patient expressed understanding and wished to proceed with surgery.  In the preoperative holding area he informed me that he was concerned about potentially hurting himself and was planning to check himself in for a 72-hour hold.      PROCEDURE:  The patient was met in the preoperative holding area, and the right hand was identified and marked as the correct surgical extremity.  Anesthesia placed an upper extremity regional block. The patient was brought to the operative suite and was placed supine on the operating room table.  Perioperative pause confirmed the correct patient, the correct procedure and the correct surgical extremity  Bony prominences were well-padded.  A well-padded nonsterile tourniquet was placed on the right upper extremity.  IV sedation and IV antibiotics were administered.       A longitudinal incision was made over the ulnar aspect of the carpus.  Full-thickness skin flaps were raised.  Bipolar electrocautery was used for meticulous hemostasis.  Cutaneous veins and nerves were mobilized and retracted throughout the procedure.  The dorsal joint capsule was incised longitudinally.  This revealed the underlying hamate body fracture.  The  dorsal fracture fragment was an extremely thin and friable wafer.  The fracture hematoma was gently removed.  The ring and small finger carpometacarpal joints were observed to be dorsally dislocated.  An intra-articular fracture of the base of the ring metacarpal was also observed.  A closed reduction with longitudinal traction and a ucvxmc-[id number]-directed force over the CMC joints was performed.  The base of the ring finger metacarpal was then examined and observed to be in reasonable alignment and without articular step-off greater than 1-2mm following the closed reduction.    Following the reduction maneuver as described above, 0.045 K-wires were introduced across the small, ring and long fingers to maintain reduction of the ring and small CMC joints.  Six individual cortices were felt as each pin was being advanced.  Following this the carpometacarpal joints were observed to be reduced both visually and with biplanar fluoroscan imaging.  This completed the open reduction and percutaneous pin stabilization of the carpometacarpal dislocations.      Attention was then directed to open reduction of the hamate body fracture.  A dental pick was used to assist with reducing the dorsal cortex of the hamate body which was again a very small fragment.  A 0.28 K-wire was used to provisionally hold the reduction.  A 1.3mm screw from the Synthes modular handset was drilled, measured and advanced by hand across the hamate body fracture.  Excellent compression was achieved visually and under radiographic imaging.       AP and lateral fluoroscan imaging of the right wrist and hand demonstrated reduction of the hamate body fracture and reduction of the ring and small finger carpometacarpal joints.  Adequate alignment of the right ring finger metacarpal intra-articular base fracture was observed.      The wound was then thoroughly irrigated, and the tourniquet was deflated after approximately 100 minutes with excellent return  of capillary refill to all digits.  Bipolar electrocautery was used for meticulous hemostasis.  K-wires were bent, trimmed and the pin caps applied.  The dorsal capsule was repaired with interrupted 4-0 FiberWire suture followed by skin and subcutaneous closure with 4-0 Monocryl suture.  Sterile dressings were applied consisting of bacitracin, Adaptic, 4 x 4s and a well-padded ulnar gutter splint after.   Sponge and instrument counts were correct at the conclusion of the procedure which was performed under loupe magnification.      POSTOPERATIVE PLAN:   The patient will be discharged today and is to remain non-weightbearing on the right upper extremity.  The patient is going to be taken to the ED for evaluation of suicidal ideation.   No narcotic pain medication prescription was written today out of concerns for his psychological self-harm issues and his mother was in agreement with this plan; per the patient's mother, patient does have access to some narcotic pain medications prescribed previously.   The patient will return to our clinic in approximately 7 days with repeat radiographs of the right hand out of plaster and then will likely be transitioned into a short arm ulnar gutter cast for a minimum of 6 weeks postoperatively.         BARRINGTON MCCLURE MD             D: 2017 17:51   T: 2017 06:57   MT: [id number]      Name:     LIN PADILLA   MRN:      [id number]        Account:        [id number]   :      1997           Procedure Date: 2017      Document: [id number]       cc: Marce BROOKS

## 2023-02-16 DIAGNOSIS — I48.20 CHRONIC ATRIAL FIBRILLATION (HCC): ICD-10-CM

## 2023-02-16 DIAGNOSIS — I48.91 ATRIAL FIBRILLATION, UNSPECIFIED TYPE (HCC): ICD-10-CM

## 2023-02-17 RX ORDER — WARFARIN SODIUM 2.5 MG/1
TABLET ORAL
Qty: 90 TABLET | Refills: 0 | Status: SHIPPED | OUTPATIENT
Start: 2023-02-17

## 2023-03-02 ENCOUNTER — OFFICE VISIT (OUTPATIENT)
Dept: ORTHOPEDIC SURGERY | Age: 68
End: 2023-03-02

## 2023-03-02 DIAGNOSIS — M17.12 PRIMARY OSTEOARTHRITIS OF LEFT KNEE: Primary | ICD-10-CM

## 2023-03-02 DIAGNOSIS — E66.01 SEVERE OBESITY (BMI 35.0-39.9) WITH COMORBIDITY (HCC): ICD-10-CM

## 2023-03-02 NOTE — PROGRESS NOTES
Chief Complaint   Patient presents with    Injections            I will proceed with a cortisone injection in the Left knee. Verbal and written consent was obtained for the injections. The skin was prepped with alcohol. A prepared mixture of 32 mg of Zilretta and 5mL diluent was injected to Left knee. The injection was given through the lateral side of the knee. The patient tolerated the injection well. I will see the patient back prn. Rui Martinez was seen today for injections. Diagnoses and all orders for this visit:    Primary osteoarthritis of left knee  -     NM ARTHROCENTESIS ASPIR&/INJ MAJOR JT/BURSA W/O US    Severe obesity (BMI 35.0-39. 9) with comorbidity ([address]    Other orders  -     triamcinolone acetonide (ZILRETTA) intra-articular injection 32 mg

## 2023-04-17 RX ORDER — FLUTICASONE PROPIONATE 50 MCG
SPRAY, SUSPENSION (ML) NASAL
Qty: 16 G | Refills: 3 | OUTPATIENT
Start: 2023-04-17

## 2023-04-25 RX ORDER — FLUTICASONE PROPIONATE 50 MCG
1 SPRAY, SUSPENSION (ML) NASAL DAILY
Qty: 16 G | Refills: 3 | Status: SHIPPED | OUTPATIENT
Start: 2023-04-25

## 2023-04-25 NOTE — TELEPHONE ENCOUNTER
Last seen 12/14/2022  Next appt 5/9/2023    Electronically signed by Jose Luis Gonzáles on 4/25/23 at 10:02 AM EDT

## 2023-05-10 ENCOUNTER — OFFICE VISIT (OUTPATIENT)
Dept: FAMILY MEDICINE CLINIC | Age: 68
End: 2023-05-10

## 2023-05-10 VITALS
HEART RATE: 56 BPM | WEIGHT: 271.8 LBS | OXYGEN SATURATION: 98 % | HEIGHT: 68 IN | DIASTOLIC BLOOD PRESSURE: 74 MMHG | TEMPERATURE: 97.3 F | SYSTOLIC BLOOD PRESSURE: 120 MMHG | BODY MASS INDEX: 41.19 KG/M2 | RESPIRATION RATE: 18 BRPM

## 2023-05-10 DIAGNOSIS — M25.551 RIGHT HIP PAIN: Primary | ICD-10-CM

## 2023-05-10 DIAGNOSIS — I48.91 ATRIAL FIBRILLATION, UNSPECIFIED TYPE (HCC): ICD-10-CM

## 2023-05-10 DIAGNOSIS — I48.20 CHRONIC ATRIAL FIBRILLATION (HCC): ICD-10-CM

## 2023-05-10 DIAGNOSIS — M19.90 OSTEOARTHRITIS, UNSPECIFIED OSTEOARTHRITIS TYPE, UNSPECIFIED SITE: ICD-10-CM

## 2023-05-10 LAB
INTERNATIONAL NORMALIZATION RATIO, POC: 2
PROTHROMBIN TIME, POC: 24.1

## 2023-05-10 RX ORDER — BACLOFEN 10 MG/1
10 TABLET ORAL EVERY EVENING
Qty: 90 TABLET | Refills: 0 | Status: SHIPPED | OUTPATIENT
Start: 2023-05-10

## 2023-05-10 RX ORDER — WARFARIN SODIUM 2.5 MG/1
TABLET ORAL
Qty: 90 TABLET | Refills: 0 | Status: SHIPPED | OUTPATIENT
Start: 2023-05-10

## 2023-05-10 SDOH — ECONOMIC STABILITY: INCOME INSECURITY: HOW HARD IS IT FOR YOU TO PAY FOR THE VERY BASICS LIKE FOOD, HOUSING, MEDICAL CARE, AND HEATING?: NOT HARD AT ALL

## 2023-05-10 SDOH — ECONOMIC STABILITY: FOOD INSECURITY: WITHIN THE PAST 12 MONTHS, THE FOOD YOU BOUGHT JUST DIDN'T LAST AND YOU DIDN'T HAVE MONEY TO GET MORE.: NEVER TRUE

## 2023-05-10 SDOH — ECONOMIC STABILITY: HOUSING INSECURITY
IN THE LAST 12 MONTHS, WAS THERE A TIME WHEN YOU DID NOT HAVE A STEADY PLACE TO SLEEP OR SLEPT IN A SHELTER (INCLUDING NOW)?: NO

## 2023-05-10 SDOH — ECONOMIC STABILITY: FOOD INSECURITY: WITHIN THE PAST 12 MONTHS, YOU WORRIED THAT YOUR FOOD WOULD RUN OUT BEFORE YOU GOT MONEY TO BUY MORE.: NEVER TRUE

## 2023-05-10 SDOH — ECONOMIC STABILITY: TRANSPORTATION INSECURITY
IN THE PAST 12 MONTHS, HAS LACK OF TRANSPORTATION KEPT YOU FROM MEETINGS, WORK, OR FROM GETTING THINGS NEEDED FOR DAILY LIVING?: NO

## 2023-05-10 ASSESSMENT — PATIENT HEALTH QUESTIONNAIRE - PHQ9
SUM OF ALL RESPONSES TO PHQ9 QUESTIONS 1 & 2: 0
1. LITTLE INTEREST OR PLEASURE IN DOING THINGS: 0
SUM OF ALL RESPONSES TO PHQ QUESTIONS 1-9: 0
2. FEELING DOWN, DEPRESSED OR HOPELESS: 0
SUM OF ALL RESPONSES TO PHQ QUESTIONS 1-9: 0

## 2023-05-12 PROBLEM — M25.551 RIGHT HIP PAIN: Status: ACTIVE | Noted: 2023-05-12

## 2023-05-12 ASSESSMENT — ENCOUNTER SYMPTOMS
BLOOD IN STOOL: 0
TROUBLE SWALLOWING: 0
APNEA: 0
COUGH: 0
EYE ITCHING: 0
RHINORRHEA: 0
SORE THROAT: 0
BACK PAIN: 1
VOICE CHANGE: 0
VOMITING: 0
CHEST TIGHTNESS: 0
SHORTNESS OF BREATH: 0
ABDOMINAL DISTENTION: 0
CHOKING: 0
CONSTIPATION: 0
ALLERGIC/IMMUNOLOGIC NEGATIVE: 1
SINUS PRESSURE: 0
DIARRHEA: 0
RECTAL PAIN: 0
ANAL BLEEDING: 0
GASTROINTESTINAL NEGATIVE: 1
PHOTOPHOBIA: 0
STRIDOR: 0
EYE PAIN: 0
EYE DISCHARGE: 0
ABDOMINAL PAIN: 0
WHEEZING: 0
EYES NEGATIVE: 1
FACIAL SWELLING: 0
EYE REDNESS: 0
NAUSEA: 0
COLOR CHANGE: 0
SINUS PAIN: 0

## 2023-05-12 NOTE — PROGRESS NOTES
Patient is aware
KNEE HIGH COMPRESSION SM) MISC Knee high with 20- 30 mmhg of compression 1 each 10    sotalol (BETAPACE) 120 MG tablet TAKE 1 TABLET BY MOUTH TWICE DAILY 180 tablet 1    torsemide (DEMADEX) 10 MG tablet TAKE 1 TABLET BY MOUTH DAILY 90 tablet 1    vitamin D (CHOLECALCIFEROL) 50 MCG (2000 UT) TABS tablet Take 1 tablet by mouth daily Takes 5 times a week      Potassium Gluconate 595 MG CAPS Take by mouth three times a week      MAGNESIUM-ZINC PO Take by mouth four times a week      Ascorbic Acid (VITAMIN C PO) Take by mouth daily Chewable      [DISCONTINUED] warfarin (COUMADIN) 2.5 MG tablet TAKE 1 TABLET BY MOUTH DAILY OR AS DIRECTED 90 tablet 0    [DISCONTINUED] SYMBICORT 80-4.5 MCG/ACT AERO Inhale 2 puffs into the lungs 2 times daily (Patient not taking: Reported on 5/10/2023) 10.2 g 3    [DISCONTINUED] baclofen (LIORESAL) 10 MG tablet Take 1 tablet by mouth every evening 90 tablet 0     No facility-administered encounter medications on file as of 5/10/2023. Return in about 6 months (around 11/10/2023).         Reviewed recent labs related to Bo Deshpande current problems      Discussed importance of regular Health Maintenance follow up  Health Maintenance   Topic    Hepatitis C screen     DTaP/Tdap/Td vaccine (1 - Tdap)    Colorectal Cancer Screen     Shingles vaccine (1 of 2)    DEXA (modify frequency per FRAX score)     Pneumococcal 65+ years Vaccine (1 - PCV)    Flu vaccine (Season Ended)    Breast cancer screen     A1C test (Diabetic or Prediabetic)     Depression Screen     Lipids     COVID-19 Vaccine     Hepatitis A vaccine     Hib vaccine     Meningococcal (ACWY) vaccine     Cervical cancer screen

## 2023-05-28 ENCOUNTER — APPOINTMENT (OUTPATIENT)
Dept: GENERAL RADIOLOGY | Age: 68
End: 2023-05-28
Payer: MEDICARE

## 2023-05-28 ENCOUNTER — HOSPITAL ENCOUNTER (EMERGENCY)
Age: 68
Discharge: HOME | End: 2023-05-29
Payer: MEDICARE

## 2023-05-28 VITALS
OXYGEN SATURATION: 96 % | SYSTOLIC BLOOD PRESSURE: 149 MMHG | TEMPERATURE: 98.3 F | BODY MASS INDEX: 39.25 KG/M2 | HEART RATE: 96 BPM | RESPIRATION RATE: 18 BRPM | DIASTOLIC BLOOD PRESSURE: 93 MMHG | WEIGHT: 265 LBS | HEIGHT: 69 IN

## 2023-05-28 DIAGNOSIS — G89.29 CHRONIC LEFT-SIDED LOW BACK PAIN WITH LEFT-SIDED SCIATICA: Primary | ICD-10-CM

## 2023-05-28 DIAGNOSIS — R79.89 ELEVATED BRAIN NATRIURETIC PEPTIDE (BNP) LEVEL: ICD-10-CM

## 2023-05-28 DIAGNOSIS — M54.42 CHRONIC LEFT-SIDED LOW BACK PAIN WITH LEFT-SIDED SCIATICA: Primary | ICD-10-CM

## 2023-05-28 DIAGNOSIS — I48.20 CHRONIC A-FIB (HCC): ICD-10-CM

## 2023-05-28 DIAGNOSIS — M47.816 LUMBAR SPONDYLOSIS: ICD-10-CM

## 2023-05-28 LAB
ALBUMIN SERPL-MCNC: 3.9 G/DL (ref 3.5–5.2)
ALP SERPL-CCNC: 59 U/L (ref 35–104)
ALT SERPL-CCNC: 10 U/L (ref 0–32)
ANION GAP SERPL CALCULATED.3IONS-SCNC: 9 MMOL/L (ref 7–16)
AST SERPL-CCNC: 19 U/L (ref 0–31)
BASOPHILS # BLD: 0.07 E9/L (ref 0–0.2)
BASOPHILS NFR BLD: 0.7 % (ref 0–2)
BILIRUB SERPL-MCNC: 0.5 MG/DL (ref 0–1.2)
BILIRUB UR QL STRIP: NEGATIVE
BNP BLD-MCNC: 1387 PG/ML (ref 0–125)
BUN SERPL-MCNC: 11 MG/DL (ref 6–23)
CALCIUM SERPL-MCNC: 9.4 MG/DL (ref 8.6–10.2)
CHLORIDE SERPL-SCNC: 108 MMOL/L (ref 98–107)
CLARITY UR: CLEAR
CO2 SERPL-SCNC: 25 MMOL/L (ref 22–29)
COLOR UR: YELLOW
CREAT SERPL-MCNC: 0.7 MG/DL (ref 0.5–1)
EOSINOPHIL # BLD: 0.12 E9/L (ref 0.05–0.5)
EOSINOPHIL NFR BLD: 1.3 % (ref 0–6)
ERYTHROCYTE [DISTWIDTH] IN BLOOD BY AUTOMATED COUNT: 13.2 FL (ref 11.5–15)
GLUCOSE SERPL-MCNC: 89 MG/DL (ref 74–99)
GLUCOSE UR STRIP-MCNC: NEGATIVE MG/DL
HCT VFR BLD AUTO: 39 % (ref 34–48)
HGB BLD-MCNC: 12.4 G/DL (ref 11.5–15.5)
HGB UR QL STRIP: NEGATIVE
IMM GRANULOCYTES # BLD: 0.03 E9/L
IMM GRANULOCYTES NFR BLD: 0.3 % (ref 0–5)
INR BLD: 2.4
KETONES UR STRIP-MCNC: NEGATIVE MG/DL
LEUKOCYTE ESTERASE UR QL STRIP: NEGATIVE
LYMPHOCYTES # BLD: 1.69 E9/L (ref 1.5–4)
LYMPHOCYTES NFR BLD: 17.8 % (ref 20–42)
MCH RBC QN AUTO: 28.7 PG (ref 26–35)
MCHC RBC AUTO-ENTMCNC: 31.8 % (ref 32–34.5)
MCV RBC AUTO: 90.3 FL (ref 80–99.9)
MONOCYTES # BLD: 0.69 E9/L (ref 0.1–0.95)
MONOCYTES NFR BLD: 7.3 % (ref 2–12)
NEUTROPHILS # BLD: 6.88 E9/L (ref 1.8–7.3)
NEUTS SEG NFR BLD: 72.6 % (ref 43–80)
NITRITE UR QL STRIP: NEGATIVE
PH UR STRIP: 7 [PH] (ref 5–9)
PLATELET # BLD AUTO: 266 E9/L (ref 130–450)
PMV BLD AUTO: 10.8 FL (ref 7–12)
POTASSIUM SERPL-SCNC: 4.2 MMOL/L (ref 3.5–5)
PROT SERPL-MCNC: 7.3 G/DL (ref 6.4–8.3)
PROT UR STRIP-MCNC: NEGATIVE MG/DL
PROTHROMBIN TIME: 27.1 SEC (ref 9.3–12.4)
RBC # BLD AUTO: 4.32 E12/L (ref 3.5–5.5)
SODIUM SERPL-SCNC: 142 MMOL/L (ref 132–146)
SP GR UR STRIP: 1.01 (ref 1–1.03)
TROPONIN, HIGH SENSITIVITY: 10 NG/L (ref 0–9)
UROBILINOGEN UR STRIP-ACNC: 1 E.U./DL
WBC # BLD: 9.5 E9/L (ref 4.5–11.5)

## 2023-05-28 PROCEDURE — 36415 COLL VENOUS BLD VENIPUNCTURE: CPT

## 2023-05-28 PROCEDURE — 80053 COMPREHEN METABOLIC PANEL: CPT

## 2023-05-28 PROCEDURE — 84484 ASSAY OF TROPONIN QUANT: CPT

## 2023-05-28 PROCEDURE — 85610 PROTHROMBIN TIME: CPT

## 2023-05-28 PROCEDURE — 71045 X-RAY EXAM CHEST 1 VIEW: CPT

## 2023-05-28 PROCEDURE — 93005 ELECTROCARDIOGRAM TRACING: CPT | Performed by: NURSE PRACTITIONER

## 2023-05-28 PROCEDURE — 99285 EMERGENCY DEPT VISIT HI MDM: CPT

## 2023-05-28 PROCEDURE — 85025 COMPLETE CBC W/AUTO DIFF WBC: CPT

## 2023-05-28 PROCEDURE — 72100 X-RAY EXAM L-S SPINE 2/3 VWS: CPT

## 2023-05-28 PROCEDURE — 73502 X-RAY EXAM HIP UNI 2-3 VIEWS: CPT

## 2023-05-28 PROCEDURE — 83880 ASSAY OF NATRIURETIC PEPTIDE: CPT

## 2023-05-28 PROCEDURE — 6370000000 HC RX 637 (ALT 250 FOR IP): Performed by: NURSE PRACTITIONER

## 2023-05-28 PROCEDURE — 81003 URINALYSIS AUTO W/O SCOPE: CPT

## 2023-05-28 RX ORDER — HYDROCODONE BITARTRATE AND ACETAMINOPHEN 5; 325 MG/1; MG/1
1 TABLET ORAL ONCE
Status: COMPLETED | OUTPATIENT
Start: 2023-05-28 | End: 2023-05-28

## 2023-05-28 RX ORDER — HYDROCODONE BITARTRATE AND ACETAMINOPHEN 5; 325 MG/1; MG/1
1 TABLET ORAL EVERY 8 HOURS PRN
Qty: 9 TABLET | Refills: 0 | Status: SHIPPED | OUTPATIENT
Start: 2023-05-28 | End: 2023-05-29 | Stop reason: SDUPTHER

## 2023-05-28 RX ADMIN — HYDROCODONE BITARTRATE AND ACETAMINOPHEN 1 TABLET: 5; 325 TABLET ORAL at 22:22

## 2023-05-28 ASSESSMENT — PAIN DESCRIPTION - DESCRIPTORS: DESCRIPTORS: ACHING;SHOOTING;DISCOMFORT

## 2023-05-28 ASSESSMENT — PAIN SCALES - GENERAL
PAINLEVEL_OUTOF10: 2
PAINLEVEL_OUTOF10: 3
PAINLEVEL_OUTOF10: 9
PAINLEVEL_OUTOF10: 8

## 2023-05-28 ASSESSMENT — PAIN DESCRIPTION - LOCATION
LOCATION: HIP
LOCATION: BUTTOCKS;LEG

## 2023-05-28 ASSESSMENT — PAIN DESCRIPTION - ORIENTATION: ORIENTATION: LEFT

## 2023-05-28 ASSESSMENT — LIFESTYLE VARIABLES: HOW OFTEN DO YOU HAVE A DRINK CONTAINING ALCOHOL: NEVER

## 2023-05-29 LAB — TROPONIN, HIGH SENSITIVITY: 10 NG/L (ref 0–9)

## 2023-05-29 RX ORDER — HYDROCODONE BITARTRATE AND ACETAMINOPHEN 5; 325 MG/1; MG/1
1 TABLET ORAL EVERY 8 HOURS PRN
Qty: 9 TABLET | Refills: 0 | Status: SHIPPED | OUTPATIENT
Start: 2023-05-29 | End: 2023-06-01

## 2023-05-29 ASSESSMENT — PAIN - FUNCTIONAL ASSESSMENT: PAIN_FUNCTIONAL_ASSESSMENT: NONE - DENIES PAIN

## 2023-05-30 LAB
EKG ATRIAL RATE: 76 BPM
EKG Q-T INTERVAL: 370 MS
EKG QRS DURATION: 84 MS
EKG QTC CALCULATION (BAZETT): 445 MS
EKG R AXIS: -33 DEGREES
EKG T AXIS: 16 DEGREES
EKG VENTRICULAR RATE: 87 BPM

## 2023-05-30 PROCEDURE — 93010 ELECTROCARDIOGRAM REPORT: CPT | Performed by: INTERNAL MEDICINE

## 2023-05-31 ENCOUNTER — OFFICE VISIT (OUTPATIENT)
Dept: FAMILY MEDICINE CLINIC | Age: 68
End: 2023-05-31

## 2023-05-31 VITALS
HEIGHT: 69 IN | WEIGHT: 271.8 LBS | RESPIRATION RATE: 18 BRPM | OXYGEN SATURATION: 96 % | SYSTOLIC BLOOD PRESSURE: 132 MMHG | TEMPERATURE: 97.4 F | BODY MASS INDEX: 40.26 KG/M2 | HEART RATE: 117 BPM | DIASTOLIC BLOOD PRESSURE: 94 MMHG

## 2023-05-31 DIAGNOSIS — M54.31 SCIATICA OF RIGHT SIDE: ICD-10-CM

## 2023-05-31 DIAGNOSIS — G89.29 CHRONIC PAIN OF LEFT KNEE: ICD-10-CM

## 2023-05-31 DIAGNOSIS — I10 PRIMARY HYPERTENSION: ICD-10-CM

## 2023-05-31 DIAGNOSIS — I48.0 PAROXYSMAL ATRIAL FIBRILLATION (HCC): Primary | ICD-10-CM

## 2023-05-31 DIAGNOSIS — D68.69 SECONDARY HYPERCOAGULABLE STATE (HCC): ICD-10-CM

## 2023-05-31 DIAGNOSIS — M25.562 CHRONIC PAIN OF LEFT KNEE: ICD-10-CM

## 2023-05-31 RX ORDER — BLOOD PRESSURE TEST KIT
1 KIT MISCELLANEOUS 2 TIMES DAILY
Qty: 1 KIT | Refills: 0 | Status: SHIPPED | OUTPATIENT
Start: 2023-05-31

## 2023-06-01 ENCOUNTER — TELEPHONE (OUTPATIENT)
Dept: ADMINISTRATIVE | Age: 68
End: 2023-06-01

## 2023-06-01 NOTE — TELEPHONE ENCOUNTER
Pt has urgent referrel for DX - Paroxysmal atrial fibrillation ([address])  - Secondary hypercoagulable state ([address])  - Primary hypertension  please review

## 2023-06-02 ASSESSMENT — ENCOUNTER SYMPTOMS
EYE REDNESS: 0
FACIAL SWELLING: 0
EYE DISCHARGE: 0
SINUS PAIN: 0
CHOKING: 0
SHORTNESS OF BREATH: 0
VOICE CHANGE: 0
GASTROINTESTINAL NEGATIVE: 1
WHEEZING: 0
ABDOMINAL DISTENTION: 0
NAUSEA: 0
VOMITING: 0
COUGH: 0
ALLERGIC/IMMUNOLOGIC NEGATIVE: 1
EYES NEGATIVE: 1
CONSTIPATION: 0
PHOTOPHOBIA: 0
CHEST TIGHTNESS: 0
ABDOMINAL PAIN: 0
DIARRHEA: 0
BLOOD IN STOOL: 0
RHINORRHEA: 0
COLOR CHANGE: 0
EYE PAIN: 0
STRIDOR: 0
SORE THROAT: 0
APNEA: 0
BACK PAIN: 1
SINUS PRESSURE: 0
TROUBLE SWALLOWING: 0
RECTAL PAIN: 0
EYE ITCHING: 0
ANAL BLEEDING: 0

## 2023-06-02 NOTE — PROGRESS NOTES
SUBJECTIVE  Eugene Terrazas is a 78 y.o. female. HPI/Chief C/O:  Chief Complaint   Patient presents with    Follow-Up from Hospital     Pt here for a hospital follow up [facility]). Left sided sciatica pain that radiated from the lower back and left knee pain. Pt states her BP was elevated. Had some abnormal labs. No Known Allergies    This 78year old female presents for ED follow up for lumbar pain with left sciatica, and left knee pain. Pt was in atrial fib., is on coumadin, and INR at ED was 2.4. Pt has ortho appointment next week. Pt denies chest pain. Pt denies incontinence of bladder and bowel and denies symptoms of cauda equina. Pt is using a walker. ROS:  Review of Systems   Constitutional:  Positive for activity change and fatigue. Negative for appetite change, chills, diaphoresis, fever and unexpected weight change. HENT:  Negative for congestion, dental problem, drooling, ear discharge, ear pain, facial swelling, hearing loss, mouth sores, nosebleeds, postnasal drip, rhinorrhea, sinus pressure, sinus pain, sneezing, sore throat, tinnitus, trouble swallowing and voice change. Eyes: Negative. Negative for photophobia, pain, discharge, redness, itching and visual disturbance. Respiratory:  Negative for apnea, cough, choking, chest tightness, shortness of breath, wheezing and stridor. Cardiovascular: Negative. Negative for chest pain, palpitations and leg swelling. Gastrointestinal: Negative. Negative for abdominal distention, abdominal pain, anal bleeding, blood in stool, constipation, diarrhea, nausea, rectal pain and vomiting. Endocrine: Negative. Negative for cold intolerance, heat intolerance, polydipsia, polyphagia and polyuria. Genitourinary: Negative. Negative for decreased urine volume, difficulty urinating, dysuria, enuresis, flank pain, frequency, genital sores, hematuria, menstrual problem, pelvic pain and urgency.    Musculoskeletal:  Positive for

## 2023-06-06 ENCOUNTER — NURSE ONLY (OUTPATIENT)
Dept: ORTHOPEDIC SURGERY | Age: 68
End: 2023-06-06
Payer: MEDICARE

## 2023-06-06 ENCOUNTER — OFFICE VISIT (OUTPATIENT)
Dept: RHEUMATOLOGY | Age: 68
End: 2023-06-06
Payer: MEDICARE

## 2023-06-06 VITALS — WEIGHT: 270 LBS | HEIGHT: 68 IN | BODY MASS INDEX: 40.92 KG/M2

## 2023-06-06 DIAGNOSIS — I48.0 PAROXYSMAL ATRIAL FIBRILLATION (HCC): ICD-10-CM

## 2023-06-06 DIAGNOSIS — M17.12 PRIMARY OSTEOARTHRITIS OF LEFT KNEE: Primary | ICD-10-CM

## 2023-06-06 DIAGNOSIS — M54.41 CHRONIC BILATERAL LOW BACK PAIN WITH BILATERAL SCIATICA: ICD-10-CM

## 2023-06-06 DIAGNOSIS — M13.0 POLYARTHRITIS: Primary | ICD-10-CM

## 2023-06-06 DIAGNOSIS — M13.0 POLYARTHRITIS: ICD-10-CM

## 2023-06-06 DIAGNOSIS — M15.9 GENERALIZED OSTEOARTHRITIS: ICD-10-CM

## 2023-06-06 DIAGNOSIS — M54.42 CHRONIC BILATERAL LOW BACK PAIN WITH BILATERAL SCIATICA: ICD-10-CM

## 2023-06-06 DIAGNOSIS — G89.29 CHRONIC BILATERAL LOW BACK PAIN WITH BILATERAL SCIATICA: ICD-10-CM

## 2023-06-06 LAB
CRP SERPL HS-MCNC: 1.5 MG/DL (ref 0–0.4)
ERYTHROCYTE [SEDIMENTATION RATE] IN BLOOD BY WESTERGREN METHOD: 25 MM/HR (ref 0–20)
RHEUMATOID FACT SER NEPH-ACNC: 27 IU/ML (ref 0–13)

## 2023-06-06 PROCEDURE — G8427 DOCREV CUR MEDS BY ELIG CLIN: HCPCS | Performed by: INTERNAL MEDICINE

## 2023-06-06 PROCEDURE — 1090F PRES/ABSN URINE INCON ASSESS: CPT | Performed by: INTERNAL MEDICINE

## 2023-06-06 PROCEDURE — 1036F TOBACCO NON-USER: CPT | Performed by: INTERNAL MEDICINE

## 2023-06-06 PROCEDURE — 1123F ACP DISCUSS/DSCN MKR DOCD: CPT | Performed by: INTERNAL MEDICINE

## 2023-06-06 PROCEDURE — G8417 CALC BMI ABV UP PARAM F/U: HCPCS | Performed by: INTERNAL MEDICINE

## 2023-06-06 PROCEDURE — G8400 PT W/DXA NO RESULTS DOC: HCPCS | Performed by: INTERNAL MEDICINE

## 2023-06-06 PROCEDURE — 99204 OFFICE O/P NEW MOD 45 MIN: CPT | Performed by: INTERNAL MEDICINE

## 2023-06-06 PROCEDURE — 3017F COLORECTAL CA SCREEN DOC REV: CPT | Performed by: INTERNAL MEDICINE

## 2023-06-06 PROCEDURE — 99999 PR OFFICE/OUTPT VISIT,PROCEDURE ONLY: CPT | Performed by: NURSE PRACTITIONER

## 2023-06-06 PROCEDURE — 20610 DRAIN/INJ JOINT/BURSA W/O US: CPT | Performed by: NURSE PRACTITIONER

## 2023-06-06 ASSESSMENT — ENCOUNTER SYMPTOMS
NAUSEA: 0
VOMITING: 0
COLOR CHANGE: 0
ABDOMINAL PAIN: 0
SHORTNESS OF BREATH: 0
TROUBLE SWALLOWING: 0
BACK PAIN: 1
DIARRHEA: 0
COUGH: 0

## 2023-06-06 NOTE — PROGRESS NOTES
Chief Complaint   Patient presents with    Injections     Left knee zilretta        I will proceed with a cortisone injection in the Left knee. Verbal and written consent was obtained for the injections. The skin was prepped with alcohol. A prepared mixture of 32 mg of Zilretta and 5mL diluent was injected to Left knee. The injection was given through the lateral side of the knee. The patient tolerated the injection well. I will see the patient back prn. Carolee Canchola was seen today for injections.     Diagnoses and all orders for this visit:    Primary osteoarthritis of left knee  -     20610 - MS DRAIN/INJECT LARGE JOINT/BURSA    Other orders  -     triamcinolone acetonide (ZILRETTA) intra-articular injection 32 mg

## 2023-06-06 NOTE — PATIENT INSTRUCTIONS
You have osteoarthritis either way     I see no striking signs of rheumatoid arthritis on exam but blood work is suspicious for possible underlying rheumatoid arthritis so will need further workup

## 2023-06-06 NOTE — PROGRESS NOTES
General: Tenderness and deformity present. No swelling. Comments: She has some Heberden's and Becky's nodes and squaring of the first CMC joints bilaterally but no obvious synovitis in the hands or wrists. She does have positive MTP squeeze test right greater than left. Skin:     General: Skin is warm and dry. Findings: No rash. Neurological:      General: No focal deficit present. Mental Status: She is alert and oriented to person, place, and time. Mental status is at baseline. Psychiatric:         Mood and Affect: Mood normal.         Behavior: Behavior normal.          Lab Results   Component Value Date    WBC 9.5 05/28/2023    HGB 12.4 05/28/2023    HCT 39.0 05/28/2023    MCV 90.3 05/28/2023     05/28/2023     Lab Results   Component Value Date     05/28/2023    K 4.2 05/28/2023     (H) 05/28/2023    CO2 25 05/28/2023    BUN 11 05/28/2023    CREATININE 0.7 05/28/2023    GLUCOSE 89 05/28/2023    CALCIUM 9.4 05/28/2023    PROT 7.3 05/28/2023    LABALBU 3.9 05/28/2023    BILITOT 0.5 05/28/2023    ALKPHOS 59 05/28/2023    AST 19 05/28/2023    ALT 10 05/28/2023    LABGLOM >60 05/28/2023    GFRAA >60 07/27/2021     Lab Results   Component Value Date    SHANT NEGATIVE 11/21/2022     No results found for: RHEUMFACTOR  Lab Results   Component Value Date    SEDRATE 26 (H) 11/21/2022     Lab Results   Component Value Date    CRP 2.3 (H) 11/21/2022            An electronic signature was used to authenticate this note. This note was generated with a voice recognition dictation system. Please disregard any errors or omission which have escaped my review.     --Debra Mccarty DO

## 2023-06-07 LAB
Lab: NORMAL
THIS TEST SENT TO: NORMAL

## 2023-06-10 LAB — CCP AB SER IA-ACNC: 1.2 U/ML (ref 0–7)

## 2023-06-20 ENCOUNTER — TELEPHONE (OUTPATIENT)
Dept: FAMILY MEDICINE CLINIC | Age: 68
End: 2023-06-20

## 2023-06-27 LAB
Lab: NORMAL
REPORT: NORMAL
THIS TEST SENT TO: NORMAL

## 2023-07-08 DIAGNOSIS — I48.20 CHRONIC ATRIAL FIBRILLATION (HCC): ICD-10-CM

## 2023-07-08 DIAGNOSIS — I10 ESSENTIAL HYPERTENSION: ICD-10-CM

## 2023-07-08 DIAGNOSIS — I48.91 ATRIAL FIBRILLATION, UNSPECIFIED TYPE (HCC): ICD-10-CM

## 2023-07-10 RX ORDER — SOTALOL HYDROCHLORIDE 120 MG/1
TABLET ORAL
Qty: 180 TABLET | Refills: 1 | Status: SHIPPED | OUTPATIENT
Start: 2023-07-10

## 2023-07-10 NOTE — TELEPHONE ENCOUNTER
Last Appointment:  12/14/2022  Future Appointments   Date Time Provider [address]   9/7/2023 10:30 AM DO Brittnee Palacios [facility]   2/1/2024 10:20 AM Tomi Anna DO [facility]

## 2023-07-20 ENCOUNTER — NURSE ONLY (OUTPATIENT)
Dept: FAMILY MEDICINE CLINIC | Age: 68
End: 2023-07-20

## 2023-07-20 DIAGNOSIS — I48.91 ATRIAL FIBRILLATION, UNSPECIFIED TYPE (HCC): Primary | ICD-10-CM

## 2023-07-20 LAB
INTERNATIONAL NORMALIZATION RATIO, POC: 1.9
PROTHROMBIN TIME, POC: 22.4

## 2023-07-24 DIAGNOSIS — I48.91 ATRIAL FIBRILLATION, UNSPECIFIED TYPE (HCC): ICD-10-CM

## 2023-07-24 DIAGNOSIS — I48.20 CHRONIC ATRIAL FIBRILLATION (HCC): ICD-10-CM

## 2023-07-24 RX ORDER — WARFARIN SODIUM 2.5 MG/1
TABLET ORAL
Qty: 90 TABLET | Refills: 0 | Status: SHIPPED | OUTPATIENT
Start: 2023-07-24

## 2023-07-24 NOTE — TELEPHONE ENCOUNTER
Last seen 5/31/2023  Next appt 8/21/2023    Electronically signed by Korey Holman on 7/24/23 at 2:37 PM EDT

## 2023-08-21 ENCOUNTER — NURSE ONLY (OUTPATIENT)
Dept: FAMILY MEDICINE CLINIC | Age: 68
End: 2023-08-21
Payer: MEDICARE

## 2023-08-21 DIAGNOSIS — I48.91 ATRIAL FIBRILLATION, UNSPECIFIED TYPE (HCC): Primary | ICD-10-CM

## 2023-08-21 LAB
INTERNATIONAL NORMALIZATION RATIO, POC: 2.4
PROTHROMBIN TIME, POC: 29.1

## 2023-08-21 PROCEDURE — 93793 ANTICOAG MGMT PT WARFARIN: CPT | Performed by: FAMILY MEDICINE

## 2023-08-21 PROCEDURE — 85610 PROTHROMBIN TIME: CPT | Performed by: FAMILY MEDICINE

## 2023-09-05 LAB — MAMMOGRAPHY, EXTERNAL: NORMAL

## 2023-09-07 ENCOUNTER — OFFICE VISIT (OUTPATIENT)
Dept: ORTHOPEDIC SURGERY | Age: 68
End: 2023-09-07

## 2023-09-07 DIAGNOSIS — M17.12 PRIMARY OSTEOARTHRITIS OF LEFT KNEE: Primary | ICD-10-CM

## 2023-09-07 NOTE — PROGRESS NOTES
Chief Complaint   Patient presents with    Injections        I will proceed with a cortisone injection in the Left knee. Verbal and written consent was obtained for the injections. The skin was prepped with alcohol. A prepared mixture of 32 mg of Zilretta and 5mL diluent was injected to Left knee. The injection was given through the lateral side of the knee. The patient tolerated the injection well. I will see the patient back prn. Angella Kuo was seen today for injections.     Diagnoses and all orders for this visit:    Primary osteoarthritis of left knee  -     AL ARTHROCENTESIS ASPIR&/INJ MAJOR JT/BURSA W/O US    Other orders  -     triamcinolone acetonide (ZILRETTA) intra-articular injection 32 mg

## 2023-09-12 DIAGNOSIS — M25.512 LEFT SHOULDER PAIN, UNSPECIFIED CHRONICITY: Primary | ICD-10-CM

## 2023-09-15 ENCOUNTER — OFFICE VISIT (OUTPATIENT)
Dept: ORTHOPEDIC SURGERY | Age: 68
End: 2023-09-15
Payer: MEDICARE

## 2023-09-15 VITALS — BODY MASS INDEX: 40.92 KG/M2 | WEIGHT: 270 LBS | HEIGHT: 68 IN | TEMPERATURE: 98 F

## 2023-09-15 DIAGNOSIS — M75.102 NONTRAUMATIC TEAR OF LEFT ROTATOR CUFF, UNSPECIFIED TEAR EXTENT: Primary | ICD-10-CM

## 2023-09-15 DIAGNOSIS — G25.89 SCAPULAR DYSKINESIS: ICD-10-CM

## 2023-09-15 PROCEDURE — G8400 PT W/DXA NO RESULTS DOC: HCPCS | Performed by: ORTHOPAEDIC SURGERY

## 2023-09-15 PROCEDURE — 99213 OFFICE O/P EST LOW 20 MIN: CPT | Performed by: ORTHOPAEDIC SURGERY

## 2023-09-15 PROCEDURE — 1036F TOBACCO NON-USER: CPT | Performed by: ORTHOPAEDIC SURGERY

## 2023-09-15 PROCEDURE — 1090F PRES/ABSN URINE INCON ASSESS: CPT | Performed by: ORTHOPAEDIC SURGERY

## 2023-09-15 PROCEDURE — G8417 CALC BMI ABV UP PARAM F/U: HCPCS | Performed by: ORTHOPAEDIC SURGERY

## 2023-09-15 PROCEDURE — 1123F ACP DISCUSS/DSCN MKR DOCD: CPT | Performed by: ORTHOPAEDIC SURGERY

## 2023-09-15 PROCEDURE — 3017F COLORECTAL CA SCREEN DOC REV: CPT | Performed by: ORTHOPAEDIC SURGERY

## 2023-09-15 PROCEDURE — G8427 DOCREV CUR MEDS BY ELIG CLIN: HCPCS | Performed by: ORTHOPAEDIC SURGERY

## 2023-09-15 NOTE — PROGRESS NOTES
Chief Complaint   Patient presents with    Shoulder Pain     Left Shoulder x 1 month, no known injury, no previous shoulder or neck surgery. States of pain and difficulty with ROM. HPI:    Patient is 80 y.o. female complaining of left shoulder pain and weakness for months. She denies a specific traumatic injury to the left shoulder. Previous treatments include rest, ice, and anti-inflammatory medication and HEP without much relief. She denies any other orthopedic complaints. ROS:    Skin: (-) rash,(-) psoriasis,(-) eczema, (-)skin cancer. Neurologic: (-)numbness, (-)tingling, (-)headaches, (-) LOC. Cardiovascular: (-) Chest pain, (-) swelling in legs/feet, (-) SOB, (-) cramping in legs/feet with walking.     All other review of systems negative except stated above or in HPI      Past Medical History:   Diagnosis Date    Atrial fibrillation ([address])     Chronic venous insufficiency 2023    Coronary heart disease 2013    Endometrial hyperplasia     Leg pain     Leg swelling 2023    Mixed hyperlipidemia 2022    Pelvic mass     Primary osteoarthritis of right shoulder 2019    Sciatica of right side 2022    Varicose veins of bilateral lower extremities with other complications     Varicose veins of both legs with edema      Past Surgical History:   Procedure Laterality Date     SECTION      DILATION AND CURETTAGE OF UTERUS      ECHO COMPL W DOP COLOR FLOW  2013         HYSTERECTOMY (CERVIX STATUS UNKNOWN)  10/12/2021    TONSILLECTOMY         Current Outpatient Medications:     warfarin (COUMADIN) 2.5 MG tablet, TAKE 1 TABLET BY MOUTH DAILY OR AS DIRECTED, Disp: 90 tablet, Rfl: 0    sotalol (BETAPACE) 120 MG tablet, TAKE 1 TABLET BY MOUTH TWICE DAILY, Disp: 180 tablet, Rfl: 1    Blood Pressure KIT, 1 each by Does not apply route in the morning and at bedtime, Disp: 1 kit, Rfl: 0    baclofen (LIORESAL) 10 MG tablet, Take 1 tablet by mouth every

## 2023-09-29 ENCOUNTER — TELEPHONE (OUTPATIENT)
Dept: FAMILY MEDICINE CLINIC | Age: 68
End: 2023-09-29

## 2023-09-29 ENCOUNTER — NURSE ONLY (OUTPATIENT)
Dept: FAMILY MEDICINE CLINIC | Age: 68
End: 2023-09-29
Payer: MEDICARE

## 2023-09-29 DIAGNOSIS — I48.20 CHRONIC ATRIAL FIBRILLATION (HCC): ICD-10-CM

## 2023-09-29 DIAGNOSIS — I48.91 ATRIAL FIBRILLATION, UNSPECIFIED TYPE (HCC): ICD-10-CM

## 2023-09-29 DIAGNOSIS — I10 ESSENTIAL HYPERTENSION: ICD-10-CM

## 2023-09-29 LAB
INTERNATIONAL NORMALIZATION RATIO, POC: 1.4
PROTHROMBIN TIME, POC: 17.2

## 2023-09-29 PROCEDURE — 85610 PROTHROMBIN TIME: CPT | Performed by: FAMILY MEDICINE

## 2023-09-29 PROCEDURE — 93793 ANTICOAG MGMT PT WARFARIN: CPT | Performed by: FAMILY MEDICINE

## 2023-10-02 RX ORDER — SOTALOL HYDROCHLORIDE 120 MG/1
120 TABLET ORAL 2 TIMES DAILY
Qty: 180 TABLET | Refills: 1 | Status: SHIPPED | OUTPATIENT
Start: 2023-10-02

## 2023-10-02 RX ORDER — WARFARIN SODIUM 2.5 MG/1
TABLET ORAL
Qty: 90 TABLET | Refills: 0 | Status: SHIPPED | OUTPATIENT
Start: 2023-10-02

## 2023-10-13 ENCOUNTER — NURSE ONLY (OUTPATIENT)
Dept: FAMILY MEDICINE CLINIC | Age: 68
End: 2023-10-13
Payer: MEDICARE

## 2023-10-13 ENCOUNTER — TELEPHONE (OUTPATIENT)
Dept: FAMILY MEDICINE CLINIC | Age: 68
End: 2023-10-13

## 2023-10-13 DIAGNOSIS — I48.91 ATRIAL FIBRILLATION, UNSPECIFIED TYPE (HCC): Primary | ICD-10-CM

## 2023-10-13 LAB
INTERNATIONAL NORMALIZATION RATIO, POC: 1.4
PROTHROMBIN TIME, POC: 16.9

## 2023-10-13 PROCEDURE — 85610 PROTHROMBIN TIME: CPT | Performed by: FAMILY MEDICINE

## 2023-10-13 PROCEDURE — 93793 ANTICOAG MGMT PT WARFARIN: CPT | Performed by: FAMILY MEDICINE

## 2023-10-13 RX ORDER — WARFARIN SODIUM 1 MG/1
1 TABLET ORAL DAILY
Qty: 90 TABLET | Refills: 1 | Status: SHIPPED | OUTPATIENT
Start: 2023-10-13

## 2023-10-13 NOTE — PROGRESS NOTES
Spoke to pt and informed of new orders. Pt will call back to make an appt due scheduling conflicts and out of town. 1mg dose to be sent to the pharmacy.     Electronically signed by Nancy Block LPN on 42/56/38 at 4:12 PM EDT

## 2023-10-13 NOTE — TELEPHONE ENCOUNTER
Pt needs 1 mg Coumadin sent to [facility].     Electronically signed by Joey Clemons LPN on 54/03/89 at 6:55 PM EDT

## 2023-10-17 ENCOUNTER — TELEPHONE (OUTPATIENT)
Dept: ORTHOPEDIC SURGERY | Age: 68
End: 2023-10-17
Payer: MEDICARE

## 2023-10-17 DIAGNOSIS — F40.240 CLAUSTROPHOBIA: Primary | ICD-10-CM

## 2023-10-17 PROCEDURE — 99421 OL DIG E/M SVC 5-10 MIN: CPT | Performed by: ORTHOPAEDIC SURGERY

## 2023-10-17 RX ORDER — DIAZEPAM 5 MG/1
5 TABLET ORAL PRN
Qty: 1 TABLET | Refills: 0 | Status: SHIPPED | OUTPATIENT
Start: 2023-10-17 | End: 2023-10-18

## 2023-10-17 NOTE — TELEPHONE ENCOUNTER
Patient's chart was reviewed including pertinent information including allergies, current medications, medical conditions, last exam and office notes, available musculoskeletal imaging, most recent labs and other physician and provider notes. In 6-minute chart review, the most appropriate medication/treatment was ordered. In addition, patient did not/ does not have any scheduled office visits within 7 days of this encounter. Patient was educated to monitor for adverse reactions and to notify office of any issues. Dionisiovalentina Wyatt was seen today for other. Diagnoses and all orders for this visit:    Claustrophobia  -     diazePAM (VALIUM) 5 MG tablet; Take 1 tablet by mouth as needed for Anxiety (take medicine 30 min prior to procedure) for up to 1 dose.

## 2023-10-17 NOTE — TELEPHONE ENCOUNTER
Pts MRI got switched to 11/21. She is claustrophobic and is asking if you can prescribe her something to calm her. She uses [facility] on [email or web address] .

## 2023-11-03 ENCOUNTER — OFFICE VISIT (OUTPATIENT)
Dept: FAMILY MEDICINE CLINIC | Age: 68
End: 2023-11-03
Payer: MEDICARE

## 2023-11-03 VITALS
OXYGEN SATURATION: 97 % | HEART RATE: 64 BPM | WEIGHT: 262.4 LBS | RESPIRATION RATE: 20 BRPM | SYSTOLIC BLOOD PRESSURE: 120 MMHG | DIASTOLIC BLOOD PRESSURE: 76 MMHG | TEMPERATURE: 97.5 F | BODY MASS INDEX: 39.77 KG/M2 | HEIGHT: 68 IN

## 2023-11-03 DIAGNOSIS — M19.011 PRIMARY OSTEOARTHRITIS OF RIGHT SHOULDER: ICD-10-CM

## 2023-11-03 DIAGNOSIS — Z00.00 INITIAL MEDICARE ANNUAL WELLNESS VISIT: ICD-10-CM

## 2023-11-03 DIAGNOSIS — I48.91 ATRIAL FIBRILLATION, UNSPECIFIED TYPE (HCC): ICD-10-CM

## 2023-11-03 DIAGNOSIS — S50.02XA CONTUSION OF LEFT ELBOW, INITIAL ENCOUNTER: ICD-10-CM

## 2023-11-03 DIAGNOSIS — M25.522 LEFT ELBOW PAIN: ICD-10-CM

## 2023-11-03 DIAGNOSIS — M79.89 SWELLING OF LEFT UPPER EXTREMITY: ICD-10-CM

## 2023-11-03 DIAGNOSIS — Z00.00 MEDICARE ANNUAL WELLNESS VISIT, INITIAL: Primary | ICD-10-CM

## 2023-11-03 DIAGNOSIS — Z12.11 SCREENING FOR COLON CANCER: ICD-10-CM

## 2023-11-03 DIAGNOSIS — I10 PRIMARY HYPERTENSION: ICD-10-CM

## 2023-11-03 LAB
INTERNATIONAL NORMALIZATION RATIO, POC: 1.2
PROTHROMBIN TIME, POC: 14.9

## 2023-11-03 PROCEDURE — 3074F SYST BP LT 130 MM HG: CPT | Performed by: FAMILY MEDICINE

## 2023-11-03 PROCEDURE — G0438 PPPS, INITIAL VISIT: HCPCS | Performed by: FAMILY MEDICINE

## 2023-11-03 PROCEDURE — 3078F DIAST BP <80 MM HG: CPT | Performed by: FAMILY MEDICINE

## 2023-11-03 PROCEDURE — 1123F ACP DISCUSS/DSCN MKR DOCD: CPT | Performed by: FAMILY MEDICINE

## 2023-11-03 PROCEDURE — 85610 PROTHROMBIN TIME: CPT | Performed by: FAMILY MEDICINE

## 2023-11-03 ASSESSMENT — PATIENT HEALTH QUESTIONNAIRE - PHQ9
SUM OF ALL RESPONSES TO PHQ QUESTIONS 1-9: 0
SUM OF ALL RESPONSES TO PHQ9 QUESTIONS 1 & 2: 0
SUM OF ALL RESPONSES TO PHQ QUESTIONS 1-9: 0
1. LITTLE INTEREST OR PLEASURE IN DOING THINGS: 0
2. FEELING DOWN, DEPRESSED OR HOPELESS: 0

## 2023-11-03 ASSESSMENT — LIFESTYLE VARIABLES
HOW OFTEN DO YOU HAVE A DRINK CONTAINING ALCOHOL: MONTHLY OR LESS
HOW MANY STANDARD DRINKS CONTAINING ALCOHOL DO YOU HAVE ON A TYPICAL DAY: 1 OR 2

## 2023-11-07 PROBLEM — M25.522 LEFT ELBOW PAIN: Status: ACTIVE | Noted: 2023-11-07

## 2023-11-07 PROBLEM — Z00.00 MEDICARE ANNUAL WELLNESS VISIT, INITIAL: Status: ACTIVE | Noted: 2023-11-07

## 2023-11-07 PROBLEM — S50.02XA CONTUSION OF LEFT ELBOW: Status: ACTIVE | Noted: 2023-11-07

## 2023-11-07 PROBLEM — M79.89 SWELLING OF LEFT UPPER EXTREMITY: Status: ACTIVE | Noted: 2023-11-07

## 2023-11-07 PROBLEM — Z12.11 SCREENING FOR COLON CANCER: Status: ACTIVE | Noted: 2023-11-07

## 2023-11-08 NOTE — PROGRESS NOTES
Medicare Annual Wellness Visit    Sangita Askew is here for Medicare AWV (Pt here for AWV.), Other (10/23/23 bumped left elbow when was traveling and is very black and blue, sore, and itchy on left forearm and elbow. Has been very tired as well.), and Office Visit for Anticoagulation Management (Did not take any coumadin x 4 days after hitting left elbow on 10/23/23 and then resumed taking 2.5mg on Sun, Tues, Thurs, Sat and 3.5mg on Mon, Wed, Fri.)      Assessment & Plan   Medicare annual wellness visit, initial  Atrial fibrillation, unspecified type ([address])  -     POCT INR  Primary hypertension  Primary osteoarthritis of right shoulder  Contusion of left elbow, initial encounter  -     XR ELBOW LEFT (MIN 3 VIEWS); Future  Screening for colon cancer  -     Leticia Bolanos MD, Gastroenterology, [facility] ([facility])  Swelling of left upper extremity  -     Vascular duplex upper extremity venous left; Future  Left elbow pain  -     Vascular duplex upper extremity venous left; Future    Pt instructed if any worse go ED ASAP. Recommendations for Preventive Services Due: see orders and patient instructions/AVS.  Recommended screening schedule for the next 5-10 years is provided to the patient in written form: see Patient Instructions/AVS.     Return in about 6 months (around 5/3/2024). Subjective   The following acute and/or chronic problems were also addressed today: This 80year old female presents for physical exam. Pt has atrial fib. ([address]), hypertension, and OA. Pt c/o possible bumped left elbow on 10/23/2023, and left upper arm is swollen, and bruised. Pt stopped eliquis for 4 days. Pt follows with cardiology, and plans to change betapace. Patient's complete Health Risk Assessment and screening values have been reviewed and are found in Flowsheets. The following problems were reviewed today and where indicated follow up appointments were made and/or referrals ordered.     Positive Risk Factor Screenings with

## 2023-11-09 ENCOUNTER — HOSPITAL ENCOUNTER (OUTPATIENT)
Dept: GENERAL RADIOLOGY | Age: 68
Discharge: HOME | End: 2023-11-11
Payer: MEDICARE

## 2023-11-09 ENCOUNTER — HOSPITAL ENCOUNTER (OUTPATIENT)
Age: 68
Discharge: HOME | End: 2023-11-11
Payer: MEDICARE

## 2023-11-09 DIAGNOSIS — M13.0 POLYARTHRITIS: ICD-10-CM

## 2023-11-09 DIAGNOSIS — S50.02XA CONTUSION OF LEFT ELBOW, INITIAL ENCOUNTER: ICD-10-CM

## 2023-11-09 PROCEDURE — 73130 X-RAY EXAM OF HAND: CPT

## 2023-11-09 PROCEDURE — 73630 X-RAY EXAM OF FOOT: CPT

## 2023-11-09 PROCEDURE — 73080 X-RAY EXAM OF ELBOW: CPT

## 2023-11-13 ENCOUNTER — NURSE ONLY (OUTPATIENT)
Dept: FAMILY MEDICINE CLINIC | Age: 68
End: 2023-11-13

## 2023-11-13 ENCOUNTER — TELEPHONE (OUTPATIENT)
Dept: ORTHOPEDIC SURGERY | Age: 68
End: 2023-11-13

## 2023-11-13 DIAGNOSIS — I48.91 ATRIAL FIBRILLATION, UNSPECIFIED TYPE (HCC): Primary | ICD-10-CM

## 2023-11-13 LAB
INTERNATIONAL NORMALIZATION RATIO, POC: 1.6
PROTHROMBIN TIME, POC: 19.6

## 2023-11-13 NOTE — TELEPHONE ENCOUNTER
Patient is asking to get Zilretta injection for left knee pain. Please contact patient once approved.  Thank you

## 2023-11-18 NOTE — PROGRESS NOTES
Chief Complaint   Patient presents with    Shoulder Pain     Left Shoulder MRI F/U          HPI:    Patient is 80 y.o. female complaining of left shoulder pain and weakness for months. She denies a specific traumatic injury to the left shoulder. Previous treatments include rest, ice, and anti-inflammatory medication and HEP without much relief. She denies any other orthopedic complaints. Follows up after MRI and states that the shoulder feels a bit better. ROS:    Skin: (-) rash,(-) psoriasis,(-) eczema, (-)skin cancer. Neurologic: (-)numbness, (-)tingling, (-)headaches, (-) LOC. Cardiovascular: (-) Chest pain, (-) swelling in legs/feet, (-) SOB, (-) cramping in legs/feet with walking.     All other review of systems negative except stated above or in HPI      Past Medical History:   Diagnosis Date    Atrial fibrillation ([address])     Chronic venous insufficiency 2023    Coronary heart disease 2013    Endometrial hyperplasia     Leg pain     Leg swelling 2023    Mixed hyperlipidemia 2022    Pelvic mass     Primary osteoarthritis of right shoulder 2019    Sciatica of right side 2022    Varicose veins of bilateral lower extremities with other complications     Varicose veins of both legs with edema      Past Surgical History:   Procedure Laterality Date     SECTION      DILATION AND CURETTAGE OF UTERUS      ECHO COMPL W DOP COLOR FLOW  2013         HYSTERECTOMY (CERVIX STATUS UNKNOWN)  10/12/2021    TONSILLECTOMY         Current Outpatient Medications:     warfarin (COUMADIN) 1 MG tablet, Take 1 tablet by mouth daily (Patient taking differently: Take 1 tablet by mouth daily As directed), Disp: 90 tablet, Rfl: 1    sotalol (BETAPACE) 120 MG tablet, Take 1 tablet by mouth 2 times daily, Disp: 180 tablet, Rfl: 1    warfarin (COUMADIN) 2.5 MG tablet, TAKE 1 TABLET BY MOUTH DAILY OR AS DIRECTED, Disp: 90 tablet, Rfl: 0    Blood Pressure KIT, 1 each by Does not

## 2023-11-21 ENCOUNTER — HOSPITAL ENCOUNTER (OUTPATIENT)
Dept: MRI IMAGING | Age: 68
Discharge: HOME | End: 2023-11-23
Attending: ORTHOPAEDIC SURGERY
Payer: MEDICARE

## 2023-11-21 DIAGNOSIS — M75.102 NONTRAUMATIC TEAR OF LEFT ROTATOR CUFF, UNSPECIFIED TEAR EXTENT: ICD-10-CM

## 2023-11-21 PROCEDURE — 73221 MRI JOINT UPR EXTREM W/O DYE: CPT

## 2023-11-22 ENCOUNTER — HOSPITAL ENCOUNTER (OUTPATIENT)
Dept: ULTRASOUND IMAGING | Age: 68
Discharge: HOME | End: 2023-11-24
Payer: MEDICARE

## 2023-11-22 ENCOUNTER — OFFICE VISIT (OUTPATIENT)
Dept: ORTHOPEDIC SURGERY | Age: 68
End: 2023-11-22

## 2023-11-22 VITALS — TEMPERATURE: 98 F | BODY MASS INDEX: 39.71 KG/M2 | HEIGHT: 68 IN | WEIGHT: 262 LBS

## 2023-11-22 DIAGNOSIS — M75.42 IMPINGEMENT SYNDROME OF LEFT SHOULDER: ICD-10-CM

## 2023-11-22 DIAGNOSIS — M75.52 CHRONIC BURSITIS OF LEFT SHOULDER: ICD-10-CM

## 2023-11-22 DIAGNOSIS — M25.522 LEFT ELBOW PAIN: ICD-10-CM

## 2023-11-22 DIAGNOSIS — Z78.9 ACTIVITY OF DAILY LIVING ALTERATION: ICD-10-CM

## 2023-11-22 DIAGNOSIS — M75.102 NONTRAUMATIC TEAR OF LEFT ROTATOR CUFF, UNSPECIFIED TEAR EXTENT: Primary | ICD-10-CM

## 2023-11-22 DIAGNOSIS — G25.89 SCAPULAR DYSKINESIS: ICD-10-CM

## 2023-11-22 DIAGNOSIS — M79.89 SWELLING OF LEFT UPPER EXTREMITY: ICD-10-CM

## 2023-11-22 PROCEDURE — 93971 EXTREMITY STUDY: CPT

## 2023-11-22 RX ORDER — TRIAMCINOLONE ACETONIDE 40 MG/ML
40 INJECTION, SUSPENSION INTRA-ARTICULAR; INTRAMUSCULAR ONCE
Status: COMPLETED | OUTPATIENT
Start: 2023-11-22 | End: 2023-11-22

## 2023-11-22 RX ADMIN — TRIAMCINOLONE ACETONIDE 40 MG: 40 INJECTION, SUSPENSION INTRA-ARTICULAR; INTRAMUSCULAR at 11:07

## 2023-11-27 ENCOUNTER — APPOINTMENT (OUTPATIENT)
Dept: GENERAL RADIOLOGY | Age: 68
End: 2023-11-27
Payer: MEDICARE

## 2023-11-27 ENCOUNTER — NURSE ONLY (OUTPATIENT)
Dept: FAMILY MEDICINE CLINIC | Age: 68
End: 2023-11-27
Payer: MEDICARE

## 2023-11-27 ENCOUNTER — APPOINTMENT (OUTPATIENT)
Dept: CT IMAGING | Age: 68
End: 2023-11-27
Payer: MEDICARE

## 2023-11-27 ENCOUNTER — HOSPITAL ENCOUNTER (EMERGENCY)
Age: 68
Discharge: HOME | End: 2023-11-27
Attending: EMERGENCY MEDICINE
Payer: MEDICARE

## 2023-11-27 VITALS
SYSTOLIC BLOOD PRESSURE: 138 MMHG | WEIGHT: 260 LBS | TEMPERATURE: 97.5 F | DIASTOLIC BLOOD PRESSURE: 78 MMHG | BODY MASS INDEX: 39.4 KG/M2 | HEART RATE: 78 BPM | OXYGEN SATURATION: 98 % | RESPIRATION RATE: 16 BRPM | HEIGHT: 68 IN

## 2023-11-27 VITALS
TEMPERATURE: 97.2 F | SYSTOLIC BLOOD PRESSURE: 142 MMHG | DIASTOLIC BLOOD PRESSURE: 100 MMHG | HEART RATE: 78 BPM | RESPIRATION RATE: 20 BRPM

## 2023-11-27 DIAGNOSIS — I10 HYPERTENSION, UNSPECIFIED TYPE: Primary | ICD-10-CM

## 2023-11-27 DIAGNOSIS — I10 ESSENTIAL HYPERTENSION: ICD-10-CM

## 2023-11-27 DIAGNOSIS — I48.91 ATRIAL FIBRILLATION, UNSPECIFIED TYPE (HCC): Primary | ICD-10-CM

## 2023-11-27 LAB
ANION GAP SERPL CALCULATED.3IONS-SCNC: 10 MMOL/L (ref 7–16)
BASOPHILS # BLD: 0.04 K/UL (ref 0–0.2)
BASOPHILS NFR BLD: 0 % (ref 0–2)
BILIRUB UR QL STRIP: NEGATIVE
BUN SERPL-MCNC: 15 MG/DL (ref 6–23)
CALCIUM SERPL-MCNC: 10.1 MG/DL (ref 8.6–10.2)
CHLORIDE SERPL-SCNC: 104 MMOL/L (ref 98–107)
CLARITY UR: CLEAR
CO2 SERPL-SCNC: 28 MMOL/L (ref 22–29)
COLOR UR: YELLOW
CREAT SERPL-MCNC: 0.7 MG/DL (ref 0.5–1)
EOSINOPHIL # BLD: 0.05 K/UL (ref 0.05–0.5)
EOSINOPHILS RELATIVE PERCENT: 1 % (ref 0–6)
EPI CELLS #/AREA URNS HPF: ABNORMAL /HPF
ERYTHROCYTE [DISTWIDTH] IN BLOOD BY AUTOMATED COUNT: 13.5 % (ref 11.5–15)
GFR SERPL CREATININE-BSD FRML MDRD: >60 ML/MIN/1.73M2
GLUCOSE SERPL-MCNC: 97 MG/DL (ref 74–99)
GLUCOSE UR STRIP-MCNC: NEGATIVE MG/DL
HCT VFR BLD AUTO: 42.6 % (ref 34–48)
HGB BLD-MCNC: 13.6 G/DL (ref 11.5–15.5)
HGB UR QL STRIP.AUTO: ABNORMAL
IMM GRANULOCYTES # BLD AUTO: 0.05 K/UL (ref 0–0.58)
IMM GRANULOCYTES NFR BLD: 1 % (ref 0–5)
INTERNATIONAL NORMALIZATION RATIO, POC: 2.8
KETONES UR STRIP-MCNC: NEGATIVE MG/DL
LEUKOCYTE ESTERASE UR QL STRIP: NEGATIVE
LYMPHOCYTES NFR BLD: 1.17 K/UL (ref 1.5–4)
LYMPHOCYTES RELATIVE PERCENT: 11 % (ref 20–42)
MCH RBC QN AUTO: 28.4 PG (ref 26–35)
MCHC RBC AUTO-ENTMCNC: 31.9 G/DL (ref 32–34.5)
MCV RBC AUTO: 88.9 FL (ref 80–99.9)
MONOCYTES NFR BLD: 0.75 K/UL (ref 0.1–0.95)
MONOCYTES NFR BLD: 7 % (ref 2–12)
NEUTROPHILS NFR BLD: 81 % (ref 43–80)
NEUTS SEG NFR BLD: 8.89 K/UL (ref 1.8–7.3)
NITRITE UR QL STRIP: NEGATIVE
PH UR STRIP: 6.5 [PH] (ref 5–9)
PLATELET # BLD AUTO: 320 K/UL (ref 130–450)
PMV BLD AUTO: 10.7 FL (ref 7–12)
POTASSIUM SERPL-SCNC: 4.2 MMOL/L (ref 3.5–5)
PROT UR STRIP-MCNC: NEGATIVE MG/DL
PROTHROMBIN TIME, POC: 33.2
RBC # BLD AUTO: 4.79 M/UL (ref 3.5–5.5)
RBC #/AREA URNS HPF: ABNORMAL /HPF
SODIUM SERPL-SCNC: 142 MMOL/L (ref 132–146)
SP GR UR STRIP: 1.01 (ref 1–1.03)
TROPONIN I SERPL HS-MCNC: 10 NG/L (ref 0–9)
UROBILINOGEN UR STRIP-ACNC: 0.2 EU/DL (ref 0–1)
WBC #/AREA URNS HPF: ABNORMAL /HPF
WBC OTHER # BLD: 11 K/UL (ref 4.5–11.5)

## 2023-11-27 PROCEDURE — 70450 CT HEAD/BRAIN W/O DYE: CPT

## 2023-11-27 PROCEDURE — 81001 URINALYSIS AUTO W/SCOPE: CPT

## 2023-11-27 PROCEDURE — 93793 ANTICOAG MGMT PT WARFARIN: CPT | Performed by: FAMILY MEDICINE

## 2023-11-27 PROCEDURE — 93005 ELECTROCARDIOGRAM TRACING: CPT | Performed by: NURSE PRACTITIONER

## 2023-11-27 PROCEDURE — 85610 PROTHROMBIN TIME: CPT | Performed by: FAMILY MEDICINE

## 2023-11-27 PROCEDURE — 99285 EMERGENCY DEPT VISIT HI MDM: CPT

## 2023-11-27 PROCEDURE — 80048 BASIC METABOLIC PNL TOTAL CA: CPT

## 2023-11-27 PROCEDURE — 85025 COMPLETE CBC W/AUTO DIFF WBC: CPT

## 2023-11-27 PROCEDURE — 71046 X-RAY EXAM CHEST 2 VIEWS: CPT

## 2023-11-27 PROCEDURE — 84484 ASSAY OF TROPONIN QUANT: CPT

## 2023-11-27 RX ORDER — NADOLOL 20 MG/1
20 TABLET ORAL DAILY
COMMUNITY

## 2023-11-27 ASSESSMENT — PAIN - FUNCTIONAL ASSESSMENT
PAIN_FUNCTIONAL_ASSESSMENT: NONE - DENIES PAIN

## 2023-11-27 NOTE — ED NOTES
Department of Emergency Medicine  FIRST PROVIDER TRIAGE NOTE             Independent MLP           11/27/23  1:58 PM EST    Date of Encounter: 11/27/23   MRN: [id number]      HPI: Leigha Byrd is a 80 y.o. female who presents to the ED for Hypertension (X 5 days. )   Feeling tired, but denies headache, chest pain, SOB. States that systolic BP has been as high as 139PPGO and diastolic in the 32'R. Has been taking her medications, but does not think that she is on anything for hypertension although she reports compliance with her Sotalol. Was not aware that this is a BP med. ROS: Negative for cp, sob, abd pain, back pain, headache, or dizziness. PE: Gen Appearance/Constitutional: alert  HEENT: NC/NT. PERRLA,  Airway patent. Neck: supple  CV: regular rate  Pulm: CTA bilat  GI: soft and NT  Musculoskeletal: moves all extremities x 4  Lymphatics: no edema     Initial Plan of Care: All treatment areas with department are currently occupied. Plan to order/Initiate the following while awaiting opening in ED: labs, EKG, and imaging studies.   Initiate Treatment-Testing, Proceed toTreatment Area When Bed Available for ED Attending/MLP to Continue Care    Electronically signed by TIFFANIE Elise CNP   DD: 11/27/23       TIFFANIE Elise CNP  11/28/23 [id number]

## 2023-11-28 LAB
EKG ATRIAL RATE: 101 BPM
EKG Q-T INTERVAL: 368 MS
EKG QRS DURATION: 82 MS
EKG QTC CALCULATION (BAZETT): 429 MS
EKG R AXIS: -35 DEGREES
EKG T AXIS: -19 DEGREES
EKG VENTRICULAR RATE: 82 BPM

## 2023-11-28 PROCEDURE — 93010 ELECTROCARDIOGRAM REPORT: CPT | Performed by: INTERNAL MEDICINE

## 2023-12-07 PROBLEM — Z00.00 MEDICARE ANNUAL WELLNESS VISIT, INITIAL: Status: RESOLVED | Noted: 2023-11-07 | Resolved: 2023-12-07

## 2023-12-07 PROBLEM — Z12.11 SCREENING FOR COLON CANCER: Status: RESOLVED | Noted: 2023-11-07 | Resolved: 2023-12-07

## 2023-12-08 ENCOUNTER — OFFICE VISIT (OUTPATIENT)
Dept: ORTHOPEDIC SURGERY | Age: 68
End: 2023-12-08

## 2023-12-08 DIAGNOSIS — M17.12 PRIMARY OSTEOARTHRITIS OF LEFT KNEE: Primary | ICD-10-CM

## 2023-12-08 NOTE — PROGRESS NOTES
Chief Complaint   Patient presents with    Injections        I will proceed with a cortisone injection in the Left knee. Verbal and written consent was obtained for the injections. The skin was prepped with alcohol. A prepared mixture of 32 mg of Zilretta and 5mL diluent was injected to Left knee. The injection was given through the lateral side of the knee. The patient tolerated the injection well. I will see the patient back prn. Oma Mar was seen today for injections.     Diagnoses and all orders for this visit:    Primary osteoarthritis of left knee  -     OK ARTHROCENTESIS ASPIR&/INJ MAJOR JT/BURSA W/O US  -     triamcinolone acetonide (ZILRETTA) intra-articular injection 32 mg

## 2023-12-11 ENCOUNTER — OFFICE VISIT (OUTPATIENT)
Dept: FAMILY MEDICINE CLINIC | Age: 68
End: 2023-12-11
Payer: MEDICARE

## 2023-12-11 VITALS
DIASTOLIC BLOOD PRESSURE: 86 MMHG | HEIGHT: 68 IN | BODY MASS INDEX: 39.74 KG/M2 | OXYGEN SATURATION: 100 % | RESPIRATION RATE: 18 BRPM | WEIGHT: 262.2 LBS | HEART RATE: 84 BPM | TEMPERATURE: 97.4 F | SYSTOLIC BLOOD PRESSURE: 138 MMHG

## 2023-12-11 DIAGNOSIS — I87.2 VENOUS STASIS DERMATITIS OF BOTH LOWER EXTREMITIES: ICD-10-CM

## 2023-12-11 DIAGNOSIS — I48.0 PAROXYSMAL ATRIAL FIBRILLATION (HCC): Primary | ICD-10-CM

## 2023-12-11 DIAGNOSIS — I82.462 ACUTE DEEP VEIN THROMBOSIS (DVT) OF CALF MUSCLE VEIN OF LEFT LOWER EXTREMITY (HCC): ICD-10-CM

## 2023-12-11 DIAGNOSIS — D68.69 SECONDARY HYPERCOAGULABLE STATE (HCC): ICD-10-CM

## 2023-12-11 DIAGNOSIS — I10 ESSENTIAL HYPERTENSION: ICD-10-CM

## 2023-12-11 DIAGNOSIS — M79.605 PAIN OF LEFT LOWER EXTREMITY: ICD-10-CM

## 2023-12-11 LAB
INTERNATIONAL NORMALIZATION RATIO, POC: 2.3
PROTHROMBIN TIME, POC: 27.1

## 2023-12-11 PROCEDURE — 3079F DIAST BP 80-89 MM HG: CPT | Performed by: FAMILY MEDICINE

## 2023-12-11 PROCEDURE — G8427 DOCREV CUR MEDS BY ELIG CLIN: HCPCS | Performed by: FAMILY MEDICINE

## 2023-12-11 PROCEDURE — G8484 FLU IMMUNIZE NO ADMIN: HCPCS | Performed by: FAMILY MEDICINE

## 2023-12-11 PROCEDURE — 99213 OFFICE O/P EST LOW 20 MIN: CPT | Performed by: FAMILY MEDICINE

## 2023-12-11 PROCEDURE — G8400 PT W/DXA NO RESULTS DOC: HCPCS | Performed by: FAMILY MEDICINE

## 2023-12-11 PROCEDURE — 85610 PROTHROMBIN TIME: CPT | Performed by: FAMILY MEDICINE

## 2023-12-11 PROCEDURE — 3075F SYST BP GE 130 - 139MM HG: CPT | Performed by: FAMILY MEDICINE

## 2023-12-11 PROCEDURE — 3017F COLORECTAL CA SCREEN DOC REV: CPT | Performed by: FAMILY MEDICINE

## 2023-12-11 PROCEDURE — 1090F PRES/ABSN URINE INCON ASSESS: CPT | Performed by: FAMILY MEDICINE

## 2023-12-11 PROCEDURE — 1036F TOBACCO NON-USER: CPT | Performed by: FAMILY MEDICINE

## 2023-12-11 PROCEDURE — G8417 CALC BMI ABV UP PARAM F/U: HCPCS | Performed by: FAMILY MEDICINE

## 2023-12-11 PROCEDURE — 1123F ACP DISCUSS/DSCN MKR DOCD: CPT | Performed by: FAMILY MEDICINE

## 2023-12-11 RX ORDER — TORSEMIDE 10 MG/1
10 TABLET ORAL DAILY
Qty: 90 TABLET | Refills: 1 | Status: SHIPPED | OUTPATIENT
Start: 2023-12-11

## 2023-12-11 RX ORDER — TRAMADOL HYDROCHLORIDE 50 MG/1
50 TABLET ORAL EVERY 6 HOURS PRN
Qty: 28 TABLET | Refills: 0 | Status: SHIPPED | OUTPATIENT
Start: 2023-12-11 | End: 2023-12-18

## 2023-12-11 ASSESSMENT — ENCOUNTER SYMPTOMS
ANAL BLEEDING: 0
BLURRED VISION: 0
GASTROINTESTINAL NEGATIVE: 1
BACK PAIN: 1
SINUS PAIN: 0
CONSTIPATION: 0
APNEA: 0
VOICE CHANGE: 0
NAUSEA: 0
SINUS PRESSURE: 0
ORTHOPNEA: 0
BLOOD IN STOOL: 0
TROUBLE SWALLOWING: 0
PHOTOPHOBIA: 0
STRIDOR: 0
WHEEZING: 0
EYES NEGATIVE: 1
ALLERGIC/IMMUNOLOGIC NEGATIVE: 1
EYE REDNESS: 0
COUGH: 0
ABDOMINAL DISTENTION: 0
EYE DISCHARGE: 0
CHOKING: 0
COLOR CHANGE: 0
CHEST TIGHTNESS: 0
EYE ITCHING: 0
SHORTNESS OF BREATH: 0
RHINORRHEA: 0
RECTAL PAIN: 0
SORE THROAT: 0
DIARRHEA: 0
VOMITING: 0
ABDOMINAL PAIN: 0
FACIAL SWELLING: 0
EYE PAIN: 0

## 2023-12-13 ENCOUNTER — HOSPITAL ENCOUNTER (OUTPATIENT)
Age: 68
Discharge: HOME | End: 2023-12-15
Payer: MEDICARE

## 2023-12-13 ENCOUNTER — HOSPITAL ENCOUNTER (OUTPATIENT)
Dept: GENERAL RADIOLOGY | Age: 68
Discharge: HOME | End: 2023-12-15
Payer: MEDICARE

## 2023-12-13 DIAGNOSIS — M79.605 PAIN OF LEFT LOWER EXTREMITY: ICD-10-CM

## 2023-12-13 PROCEDURE — 73590 X-RAY EXAM OF LOWER LEG: CPT

## 2023-12-14 ENCOUNTER — HOSPITAL ENCOUNTER (OUTPATIENT)
Dept: ULTRASOUND IMAGING | Age: 68
Discharge: HOME | End: 2023-12-16
Attending: FAMILY MEDICINE
Payer: MEDICARE

## 2023-12-14 DIAGNOSIS — I82.462 ACUTE DEEP VEIN THROMBOSIS (DVT) OF CALF MUSCLE VEIN OF LEFT LOWER EXTREMITY (HCC): ICD-10-CM

## 2023-12-14 PROCEDURE — 93971 EXTREMITY STUDY: CPT

## 2024-01-10 NOTE — TELEPHONE ENCOUNTER
Error     Intermediate Repair Preamble Text (Leave Blank If You Do Not Want): Undermining was performed with blunt dissection.

## 2024-01-11 ENCOUNTER — NURSE ONLY (OUTPATIENT)
Dept: FAMILY MEDICINE CLINIC | Age: 69
End: 2024-01-11

## 2024-01-11 DIAGNOSIS — I48.0 PAROXYSMAL ATRIAL FIBRILLATION (HCC): Primary | ICD-10-CM

## 2024-01-11 LAB
INTERNATIONAL NORMALIZATION RATIO, POC: 1.5
PROTHROMBIN TIME, POC: 17.9

## 2024-01-11 NOTE — PROGRESS NOTES
1/11/2024: PT: 17.9; INR: 1.5 Taking Coumadin 3.5 daily. No missed doses     Electronically signed by Stephie Palacios LPN on 1/11/24 at 9:37 AM EST

## 2024-02-15 RX ORDER — FLUTICASONE PROPIONATE 50 MCG
SPRAY, SUSPENSION (ML) NASAL
Qty: 16 G | Refills: 3 | OUTPATIENT
Start: 2024-02-15

## 2024-02-22 ENCOUNTER — NURSE ONLY (OUTPATIENT)
Dept: FAMILY MEDICINE CLINIC | Age: 69
End: 2024-02-22

## 2024-02-22 DIAGNOSIS — I48.0 PAROXYSMAL ATRIAL FIBRILLATION (HCC): Primary | ICD-10-CM

## 2024-02-22 LAB
INTERNATIONAL NORMALIZATION RATIO, POC: 2.9
PROTHROMBIN TIME, POC: 34.6

## 2024-02-22 NOTE — PROGRESS NOTES
2/22/2024: PT: 34.6; INR: 2.9. Taking Coumadin 3.5mg daily. Pt missed a dose yesterday (2/21/2024) and only took 1 mg today.     Electronically signed by Stephie Palacios LPN on 2/22/24 at 1:44 PM EST

## 2024-02-23 ENCOUNTER — TELEPHONE (OUTPATIENT)
Dept: FAMILY MEDICINE CLINIC | Age: 69
End: 2024-02-23

## 2024-02-23 DIAGNOSIS — M79.89 LEFT LEG SWELLING: Primary | ICD-10-CM

## 2024-02-23 NOTE — TELEPHONE ENCOUNTER
Pt states she cannot do stat, she has work and cannot go today but she will call and get it scheduled soon. Pt was given instructions for scheduling.     Electronically signed by TIANA WITT MA on 2/23/24 at 12:56 PM EST

## 2024-02-23 NOTE — TELEPHONE ENCOUNTER
Patient calling states that she has some swelling in her left calf and knee. Patient states she does not want to just go to ED but would like orders for U/s to rule out DVT. Please advise

## 2024-03-01 ENCOUNTER — HOSPITAL ENCOUNTER (OUTPATIENT)
Dept: ULTRASOUND IMAGING | Age: 69
End: 2024-03-01
Payer: MEDICARE

## 2024-03-01 DIAGNOSIS — M79.89 LEFT LEG SWELLING: ICD-10-CM

## 2024-03-01 PROCEDURE — 93971 EXTREMITY STUDY: CPT

## 2024-03-21 ENCOUNTER — NURSE ONLY (OUTPATIENT)
Dept: FAMILY MEDICINE CLINIC | Age: 69
End: 2024-03-21

## 2024-03-21 ENCOUNTER — TELEPHONE (OUTPATIENT)
Dept: FAMILY MEDICINE CLINIC | Age: 69
End: 2024-03-21

## 2024-03-21 DIAGNOSIS — I48.0 PAROXYSMAL ATRIAL FIBRILLATION (HCC): Primary | ICD-10-CM

## 2024-03-21 DIAGNOSIS — I48.20 CHRONIC ATRIAL FIBRILLATION (HCC): ICD-10-CM

## 2024-03-21 DIAGNOSIS — I48.91 ATRIAL FIBRILLATION, UNSPECIFIED TYPE (HCC): ICD-10-CM

## 2024-03-21 LAB
INTERNATIONAL NORMALIZATION RATIO, POC: 2.9
PROTHROMBIN TIME, POC: 34.7

## 2024-03-21 NOTE — TELEPHONE ENCOUNTER
Last Appointment:  12/18/2023  Future Appointments   Date Time Provider Department Center   5/8/2024  1:30 PM Bing Mcclain DO [facility]    Electronically signed by Stephie Palacios LPN on 3/21/24 at 1:39 PM EDT

## 2024-03-21 NOTE — PROGRESS NOTES
3/21/2024: PT: 34.7; INR: 2.9 Pt taking Coumdin 3.5mg every day with no missed doses    Electronically signed by Stephie Palacios LPN on 3/21/24 at 1:37 PM EDT

## 2024-03-22 RX ORDER — WARFARIN SODIUM 2.5 MG/1
TABLET ORAL
Qty: 90 TABLET | Refills: 0 | Status: SHIPPED | OUTPATIENT
Start: 2024-03-22

## 2024-04-15 ENCOUNTER — TELEPHONE (OUTPATIENT)
Dept: FAMILY MEDICINE CLINIC | Age: 69
End: 2024-04-15

## 2024-05-05 ASSESSMENT — PATIENT HEALTH QUESTIONNAIRE - PHQ9
2. FEELING DOWN, DEPRESSED OR HOPELESS: NOT AT ALL
2. FEELING DOWN, DEPRESSED OR HOPELESS: NOT AT ALL
SUM OF ALL RESPONSES TO PHQ9 QUESTIONS 1 & 2: 0
SUM OF ALL RESPONSES TO PHQ9 QUESTIONS 1 & 2: 0
SUM OF ALL RESPONSES TO PHQ QUESTIONS 1-9: 0
SUM OF ALL RESPONSES TO PHQ QUESTIONS 1-9: 0
1. LITTLE INTEREST OR PLEASURE IN DOING THINGS: NOT AT ALL
SUM OF ALL RESPONSES TO PHQ QUESTIONS 1-9: 0
SUM OF ALL RESPONSES TO PHQ QUESTIONS 1-9: 0
1. LITTLE INTEREST OR PLEASURE IN DOING THINGS: NOT AT ALL

## 2024-05-08 ENCOUNTER — OFFICE VISIT (OUTPATIENT)
Dept: FAMILY MEDICINE CLINIC | Age: 69
End: 2024-05-08

## 2024-05-08 VITALS
OXYGEN SATURATION: 96 % | BODY MASS INDEX: 39.77 KG/M2 | SYSTOLIC BLOOD PRESSURE: 134 MMHG | WEIGHT: 262.4 LBS | DIASTOLIC BLOOD PRESSURE: 88 MMHG | RESPIRATION RATE: 18 BRPM | HEIGHT: 68 IN | TEMPERATURE: 97 F | HEART RATE: 97 BPM

## 2024-05-08 DIAGNOSIS — M79.89 LEG SWELLING: ICD-10-CM

## 2024-05-08 DIAGNOSIS — E66.01 SEVERE OBESITY (BMI 35.0-39.9) WITH COMORBIDITY (HCC): ICD-10-CM

## 2024-05-08 DIAGNOSIS — I82.462 ACUTE DEEP VEIN THROMBOSIS (DVT) OF CALF MUSCLE VEIN OF LEFT LOWER EXTREMITY (HCC): ICD-10-CM

## 2024-05-08 DIAGNOSIS — G89.29 CHRONIC PAIN OF LEFT KNEE: ICD-10-CM

## 2024-05-08 DIAGNOSIS — R73.01 IFG (IMPAIRED FASTING GLUCOSE): Primary | ICD-10-CM

## 2024-05-08 DIAGNOSIS — M25.562 CHRONIC PAIN OF LEFT KNEE: ICD-10-CM

## 2024-05-08 DIAGNOSIS — D68.69 SECONDARY HYPERCOAGULABLE STATE (HCC): ICD-10-CM

## 2024-05-08 DIAGNOSIS — I87.2 CHRONIC VENOUS INSUFFICIENCY: ICD-10-CM

## 2024-05-08 DIAGNOSIS — I48.91 ATRIAL FIBRILLATION, UNSPECIFIED TYPE (HCC): ICD-10-CM

## 2024-05-08 LAB
CHP ED QC CHECK: NORMAL
GLUCOSE BLD-MCNC: 103 MG/DL
HBA1C MFR BLD: 5.5 %
INTERNATIONAL NORMALIZATION RATIO, POC: 2.9
PROTHROMBIN TIME, POC: NORMAL

## 2024-05-13 ASSESSMENT — ENCOUNTER SYMPTOMS
TROUBLE SWALLOWING: 0
SINUS PAIN: 0
ABDOMINAL PAIN: 0
ABDOMINAL DISTENTION: 0
APNEA: 0
VOICE CHANGE: 0
RHINORRHEA: 0
EYE REDNESS: 0
CONSTIPATION: 0
NAUSEA: 0
EYE DISCHARGE: 0
CHEST TIGHTNESS: 0
BLOOD IN STOOL: 0
SINUS PRESSURE: 0
COUGH: 0
GASTROINTESTINAL NEGATIVE: 1
SORE THROAT: 0
STRIDOR: 0
BACK PAIN: 1
EYES NEGATIVE: 1
PHOTOPHOBIA: 0
EYE PAIN: 0
VOMITING: 0
RECTAL PAIN: 0
ALLERGIC/IMMUNOLOGIC NEGATIVE: 1
ANAL BLEEDING: 0
EYE ITCHING: 0
SHORTNESS OF BREATH: 0
COLOR CHANGE: 0

## 2024-05-13 NOTE — PROGRESS NOTES
Patient aware, INR check scheduled 6/6/2024 per patient needs  
  Abdominal:      General: Bowel sounds are normal. There is no distension.      Palpations: Abdomen is soft. There is no mass.      Tenderness: There is no abdominal tenderness. There is no right CVA tenderness, left CVA tenderness, guarding or rebound.      Hernia: No hernia is present.   Musculoskeletal:         General: Tenderness present. No swelling, deformity or signs of injury.      Right shoulder: No swelling, deformity, effusion, laceration, bony tenderness or crepitus. Normal strength. Normal pulse.      Cervical back: Normal range of motion and neck supple. No rigidity or tenderness. No muscular tenderness.      Right lower leg: Edema (venous stasis dermatosis) present.      Left lower leg: Edema (venous stasis dermatosis) present.      Comments:   She has and acute pain into her left calf.      Lymphadenopathy:      Cervical: No cervical adenopathy.   Skin:     General: Skin is warm.      Coloration: Skin is not jaundiced or pale.      Findings: No bruising, erythema, lesion or rash.   Neurological:      General: No focal deficit present.      Mental Status: She is alert and oriented to person, place, and time.      Cranial Nerves: No cranial nerve deficit.      Sensory: No sensory deficit.      Motor: No weakness or abnormal muscle tone.      Coordination: Coordination normal.      Gait: Gait normal.      Deep Tendon Reflexes: Reflexes are normal and symmetric. Reflexes normal.   Psychiatric:         Mood and Affect: Mood normal.         Behavior: Behavior normal.         Thought Content: Thought content normal.         Judgment: Judgment normal.         ASSESSMENT/PLAN  Katie was seen today for follow-up.    Diagnoses and all orders for this visit:    IFG (impaired fasting glucose)  -     POCT Glucose  -     POCT glycosylated hemoglobin (Hb A1C)  Stable.   Atrial fibrillation, unspecified type (HCC)  -     POCT INR  Controlled. Coumadin.   Chronic venous insufficiency  Not controlled. Demadex.

## 2024-05-15 RX ORDER — NADOLOL 20 MG/1
20 TABLET ORAL DAILY
Qty: 90 TABLET | Refills: 1 | Status: SHIPPED | OUTPATIENT
Start: 2024-05-15 | End: 2024-11-11

## 2024-05-15 NOTE — TELEPHONE ENCOUNTER
Last seen 5/8/2024  Next appt 6/6/2024    Electronically signed by TIANA WITT MA on 5/15/24 at 9:37 AM EDT

## 2024-05-16 ENCOUNTER — OFFICE VISIT (OUTPATIENT)
Dept: ORTHOPEDIC SURGERY | Age: 69
End: 2024-05-16
Payer: MEDICARE

## 2024-05-16 VITALS — HEIGHT: 68 IN | BODY MASS INDEX: 39.71 KG/M2 | WEIGHT: 262 LBS | TEMPERATURE: 98 F

## 2024-05-16 DIAGNOSIS — M17.12 PRIMARY OSTEOARTHRITIS OF LEFT KNEE: Primary | ICD-10-CM

## 2024-05-16 PROCEDURE — G8400 PT W/DXA NO RESULTS DOC: HCPCS | Performed by: ORTHOPAEDIC SURGERY

## 2024-05-16 PROCEDURE — 1123F ACP DISCUSS/DSCN MKR DOCD: CPT | Performed by: ORTHOPAEDIC SURGERY

## 2024-05-16 PROCEDURE — G8417 CALC BMI ABV UP PARAM F/U: HCPCS | Performed by: ORTHOPAEDIC SURGERY

## 2024-05-16 PROCEDURE — 3017F COLORECTAL CA SCREEN DOC REV: CPT | Performed by: ORTHOPAEDIC SURGERY

## 2024-05-16 PROCEDURE — G8427 DOCREV CUR MEDS BY ELIG CLIN: HCPCS | Performed by: ORTHOPAEDIC SURGERY

## 2024-05-16 PROCEDURE — 20610 DRAIN/INJ JOINT/BURSA W/O US: CPT | Performed by: ORTHOPAEDIC SURGERY

## 2024-05-16 PROCEDURE — 1090F PRES/ABSN URINE INCON ASSESS: CPT | Performed by: ORTHOPAEDIC SURGERY

## 2024-05-16 PROCEDURE — 1036F TOBACCO NON-USER: CPT | Performed by: ORTHOPAEDIC SURGERY

## 2024-05-16 PROCEDURE — 99213 OFFICE O/P EST LOW 20 MIN: CPT | Performed by: ORTHOPAEDIC SURGERY

## 2024-05-16 NOTE — PROGRESS NOTES
Chief Complaint   Patient presents with    Follow-up     L Knee Pain: Pt has pain in the L Knee. 7/10 pain. (-) Crepitus. Pt described increased difficulty with sit to stand transitioning. Pt has increased posterior L Knee Pain. Pt has localized L Knee swelling in the L Knee.        Katie Garcia returns today for follow-up of her left knee pain. She complains of swelling and trouble getting up.  The patient's pain level is a 7/10. The previous treatment was successful.    Past Medical History:   Diagnosis Date    Atrial fibrillation (HCC)     Chronic venous insufficiency 2023    Coronary heart disease 2013    Endometrial hyperplasia     Leg pain     Leg swelling 2023    Mixed hyperlipidemia 2022    Pelvic mass     Primary osteoarthritis of right shoulder 2019    Sciatica of right side 2022    Varicose veins of bilateral lower extremities with other complications 2023    Varicose veins of both legs with edema      Past Surgical History:   Procedure Laterality Date     SECTION      DILATION AND CURETTAGE OF UTERUS      ECHO COMPL W DOP COLOR FLOW  2013         HYSTERECTOMY (CERVIX STATUS UNKNOWN)  10/12/2021    TONSILLECTOMY         Current Outpatient Medications:     nadolol (CORGARD) 20 MG tablet, Take 1 tablet by mouth daily, Disp: 90 tablet, Rfl: 1    warfarin (COUMADIN) 2.5 MG tablet, TAKE 1 TABLET BY MOUTH DAILY OR AS DIRECTED, Disp: 90 tablet, Rfl: 0    warfarin (COUMADIN) 1 MG tablet, Take 1 tablet by mouth daily, Disp: 90 tablet, Rfl: 1    torsemide (DEMADEX) 10 MG tablet, Take 1 tablet by mouth daily (Patient taking differently: Take 1 tablet by mouth daily Takes 3 times per week.), Disp: 90 tablet, Rfl: 1    Blood Pressure KIT, 1 each by Does not apply route in the morning and at bedtime, Disp: 1 kit, Rfl: 0    baclofen (LIORESAL) 10 MG tablet, Take 1 tablet by mouth every evening (Patient taking differently: Take 1 tablet by mouth every evening PRN),

## 2024-06-06 ENCOUNTER — NURSE ONLY (OUTPATIENT)
Dept: FAMILY MEDICINE CLINIC | Age: 69
End: 2024-06-06

## 2024-06-06 DIAGNOSIS — I48.91 ATRIAL FIBRILLATION, UNSPECIFIED TYPE (HCC): Primary | ICD-10-CM

## 2024-06-06 LAB
INTERNATIONAL NORMALIZATION RATIO, POC: 2.3
PROTHROMBIN TIME, POC: NORMAL

## 2024-06-06 NOTE — PROGRESS NOTES
6/6/2024: INR: 2.3 Pt takes Coumadin 3.5 MG every day     Electronically signed by Stephie Palacios LPN on 6/6/24 at 8:40 AM EDT

## 2024-07-08 ENCOUNTER — NURSE ONLY (OUTPATIENT)
Dept: FAMILY MEDICINE CLINIC | Age: 69
End: 2024-07-08
Payer: MEDICARE

## 2024-07-08 DIAGNOSIS — I48.91 ATRIAL FIBRILLATION, UNSPECIFIED TYPE (HCC): Primary | ICD-10-CM

## 2024-07-08 DIAGNOSIS — I48.20 CHRONIC ATRIAL FIBRILLATION (HCC): ICD-10-CM

## 2024-07-08 LAB
INTERNATIONAL NORMALIZATION RATIO, POC: 2.5
PROTHROMBIN TIME, POC: NORMAL

## 2024-07-08 PROCEDURE — 93793 ANTICOAG MGMT PT WARFARIN: CPT | Performed by: FAMILY MEDICINE

## 2024-07-08 PROCEDURE — 85610 PROTHROMBIN TIME: CPT | Performed by: FAMILY MEDICINE

## 2024-07-08 SDOH — ECONOMIC STABILITY: FOOD INSECURITY: WITHIN THE PAST 12 MONTHS, THE FOOD YOU BOUGHT JUST DIDN'T LAST AND YOU DIDN'T HAVE MONEY TO GET MORE.: NEVER TRUE

## 2024-07-08 SDOH — ECONOMIC STABILITY: FOOD INSECURITY: WITHIN THE PAST 12 MONTHS, YOU WORRIED THAT YOUR FOOD WOULD RUN OUT BEFORE YOU GOT MONEY TO BUY MORE.: NEVER TRUE

## 2024-07-08 SDOH — ECONOMIC STABILITY: INCOME INSECURITY: HOW HARD IS IT FOR YOU TO PAY FOR THE VERY BASICS LIKE FOOD, HOUSING, MEDICAL CARE, AND HEATING?: NOT HARD AT ALL

## 2024-07-09 RX ORDER — WARFARIN SODIUM 2.5 MG/1
TABLET ORAL
Qty: 90 TABLET | Refills: 1 | Status: SHIPPED | OUTPATIENT
Start: 2024-07-09

## 2024-07-09 RX ORDER — WARFARIN SODIUM 1 MG/1
1 TABLET ORAL DAILY
Qty: 90 TABLET | Refills: 1 | Status: SHIPPED | OUTPATIENT
Start: 2024-07-09

## 2024-08-05 ENCOUNTER — NURSE ONLY (OUTPATIENT)
Dept: FAMILY MEDICINE CLINIC | Age: 69
End: 2024-08-05
Payer: MEDICARE

## 2024-08-05 DIAGNOSIS — I48.0 PAROXYSMAL ATRIAL FIBRILLATION (HCC): Primary | ICD-10-CM

## 2024-08-05 LAB
INTERNATIONAL NORMALIZATION RATIO, POC: 2.7
PROTHROMBIN TIME, POC: NORMAL

## 2024-08-05 PROCEDURE — 93793 ANTICOAG MGMT PT WARFARIN: CPT | Performed by: FAMILY MEDICINE

## 2024-08-05 PROCEDURE — 85610 PROTHROMBIN TIME: CPT | Performed by: FAMILY MEDICINE

## 2024-08-14 DIAGNOSIS — M17.12 PRIMARY OSTEOARTHRITIS OF LEFT KNEE: Primary | ICD-10-CM

## 2024-08-15 NOTE — TELEPHONE ENCOUNTER
Patient called and she would like a prescription of Ibuprofen for her left knee sent in to [facility] [address] in [place]

## 2024-08-16 RX ORDER — IBUPROFEN 600 MG/1
600 TABLET ORAL 4 TIMES DAILY PRN
Qty: 120 TABLET | Refills: 0 | Status: SHIPPED | OUTPATIENT
Start: 2024-08-16 | End: 2024-09-15

## 2024-08-19 ENCOUNTER — OFFICE VISIT (OUTPATIENT)
Dept: ORTHOPEDIC SURGERY | Age: 69
End: 2024-08-19
Payer: MEDICARE

## 2024-08-19 VITALS — WEIGHT: 253 LBS | HEIGHT: 67 IN | TEMPERATURE: 98.6 F | BODY MASS INDEX: 39.71 KG/M2

## 2024-08-19 DIAGNOSIS — M17.12 PRIMARY OSTEOARTHRITIS OF LEFT KNEE: Primary | ICD-10-CM

## 2024-08-19 PROCEDURE — 1123F ACP DISCUSS/DSCN MKR DOCD: CPT | Performed by: ORTHOPAEDIC SURGERY

## 2024-08-19 PROCEDURE — 3017F COLORECTAL CA SCREEN DOC REV: CPT | Performed by: ORTHOPAEDIC SURGERY

## 2024-08-19 PROCEDURE — 1090F PRES/ABSN URINE INCON ASSESS: CPT | Performed by: ORTHOPAEDIC SURGERY

## 2024-08-19 PROCEDURE — G8400 PT W/DXA NO RESULTS DOC: HCPCS | Performed by: ORTHOPAEDIC SURGERY

## 2024-08-19 PROCEDURE — 99214 OFFICE O/P EST MOD 30 MIN: CPT | Performed by: ORTHOPAEDIC SURGERY

## 2024-08-19 PROCEDURE — 1036F TOBACCO NON-USER: CPT | Performed by: ORTHOPAEDIC SURGERY

## 2024-08-19 PROCEDURE — G8427 DOCREV CUR MEDS BY ELIG CLIN: HCPCS | Performed by: ORTHOPAEDIC SURGERY

## 2024-08-19 PROCEDURE — G8417 CALC BMI ABV UP PARAM F/U: HCPCS | Performed by: ORTHOPAEDIC SURGERY

## 2024-08-19 NOTE — PROGRESS NOTES
Transportation Needs: Unknown (7/8/2024)    PRAPARE - Transportation     Lack of Transportation (Medical): Not on file     Lack of Transportation (Non-Medical): No   Physical Activity: Inactive (11/3/2023)    Exercise Vital Sign     Days of Exercise per Week: 0 days     Minutes of Exercise per Session: 0 min   Stress: Not on file   Social Connections: Not on file   Intimate Partner Violence: Not on file   Housing Stability: Unknown (7/8/2024)    Housing Stability Vital Sign     Unable to Pay for Housing in the Last Year: Not on file     Number of Places Lived in the Last Year: Not on file     Unstable Housing in the Last Year: No     Family History   Problem Relation Age of Onset    Breast Cancer Maternal Grandmother     Breast Cancer Maternal Aunt     Ovarian Cancer Neg Hx     Colon Cancer Neg Hx        Review of Systems:     General/Constitution:  (-)weight loss, (-)fever, (-)chills, (-)weakness.   Skin: (-) rash,(-) psoriasis,(-) eczema, (-)skin cancer.   Musculoskeletal: (-) fractures,  (-) dislocations,(-) collagen vascular disease, (-) fibromyalgia, (-) multiple sclerosis, (-) muscular dystrophy, (-) RSD,(-) joint pain (-)swelling, (-) joint pain,swelling.  Neurologic: (-) epilepsy, (-)seizures,(-) brain tumor,(-) TIA, (-)stroke, (-)headaches, (-)Parkinson disease,(-) memory loss, (-) LOC.  Cardiovascular: (-) Chest pain, (-) swelling in legs/feet, (-) SOB, (-) cramping in legs/feet with walking.  Respiratory: (-) SOB, (-) Coughing, (-) night sweats.  GI: (-) nausea, (-) vomiting, (-) diarrhea, (-) blood in stool, (-) gastric ulcer.  Psychiatric: (-) Depression, (-) Anxiety, (-) bipolar disease, (-) Alzheimer's Disease  Allergic/Immunologic: (-) allergies latex, (-) allergies metal, (-) skin sensitivity.  Hematlogic: (-) anemia, (-) blood transfusion, (-) DVT/PE, (-) Clotting disorders    Constitutional:  The patient is alert and oriented x 3, appears to be stated age and in no distress.Temp 98.6 °F (37 °C)

## 2024-08-26 ENCOUNTER — OFFICE VISIT (OUTPATIENT)
Dept: FAMILY MEDICINE CLINIC | Age: 69
End: 2024-08-26
Payer: MEDICARE

## 2024-08-26 VITALS
BODY MASS INDEX: 40.06 KG/M2 | SYSTOLIC BLOOD PRESSURE: 136 MMHG | DIASTOLIC BLOOD PRESSURE: 88 MMHG | WEIGHT: 255.2 LBS | HEIGHT: 67 IN | RESPIRATION RATE: 20 BRPM | HEART RATE: 102 BPM | TEMPERATURE: 97.4 F | OXYGEN SATURATION: 96 %

## 2024-08-26 DIAGNOSIS — Z01.818 PRE-OP EXAM: Primary | ICD-10-CM

## 2024-08-26 DIAGNOSIS — I10 ESSENTIAL HYPERTENSION: ICD-10-CM

## 2024-08-26 DIAGNOSIS — R73.01 IFG (IMPAIRED FASTING GLUCOSE): ICD-10-CM

## 2024-08-26 DIAGNOSIS — I87.2 CHRONIC VENOUS INSUFFICIENCY: ICD-10-CM

## 2024-08-26 DIAGNOSIS — D68.69 SECONDARY HYPERCOAGULABLE STATE (HCC): ICD-10-CM

## 2024-08-26 DIAGNOSIS — I48.0 PAROXYSMAL ATRIAL FIBRILLATION (HCC): ICD-10-CM

## 2024-08-26 DIAGNOSIS — R53.83 FATIGUE, UNSPECIFIED TYPE: ICD-10-CM

## 2024-08-26 DIAGNOSIS — I82.462 ACUTE DEEP VEIN THROMBOSIS (DVT) OF CALF MUSCLE VEIN OF LEFT LOWER EXTREMITY (HCC): ICD-10-CM

## 2024-08-26 DIAGNOSIS — E78.2 MIXED HYPERLIPIDEMIA: ICD-10-CM

## 2024-08-26 LAB
ALBUMIN: 3.9 G/DL (ref 3.5–5.2)
ALP BLD-CCNC: 55 U/L (ref 35–104)
ALT SERPL-CCNC: 11 U/L (ref 0–32)
ANION GAP SERPL CALCULATED.3IONS-SCNC: 11 MMOL/L (ref 7–16)
AST SERPL-CCNC: 23 U/L (ref 0–31)
BASOPHILS ABSOLUTE: 0.07 K/UL (ref 0–0.2)
BASOPHILS RELATIVE PERCENT: 1 % (ref 0–2)
BILIRUB SERPL-MCNC: 0.6 MG/DL (ref 0–1.2)
BUN BLDV-MCNC: 12 MG/DL (ref 6–23)
CALCIUM SERPL-MCNC: 9.7 MG/DL (ref 8.6–10.2)
CHLORIDE BLD-SCNC: 107 MMOL/L (ref 98–107)
CHOLESTEROL, TOTAL: 158 MG/DL
CHP ED QC CHECK: NORMAL
CO2: 25 MMOL/L (ref 22–29)
CREAT SERPL-MCNC: 0.7 MG/DL (ref 0.5–1)
EOSINOPHILS ABSOLUTE: 0.1 K/UL (ref 0.05–0.5)
EOSINOPHILS RELATIVE PERCENT: 2 % (ref 0–6)
GFR, ESTIMATED: >90 ML/MIN/1.73M2
GLUCOSE BLD-MCNC: 123 MG/DL
GLUCOSE BLD-MCNC: 87 MG/DL (ref 74–99)
HBA1C MFR BLD: 5.6 %
HCT VFR BLD CALC: 40.5 % (ref 34–48)
HDLC SERPL-MCNC: 39 MG/DL
HEMOGLOBIN: 12.4 G/DL (ref 11.5–15.5)
IMMATURE GRANULOCYTES %: 0 % (ref 0–5)
IMMATURE GRANULOCYTES ABSOLUTE: <0.03 K/UL (ref 0–0.58)
LDL CHOLESTEROL: 103 MG/DL
LYMPHOCYTES ABSOLUTE: 1.21 K/UL (ref 1.5–4)
LYMPHOCYTES RELATIVE PERCENT: 20 % (ref 20–42)
MCH RBC QN AUTO: 29.2 PG (ref 26–35)
MCHC RBC AUTO-ENTMCNC: 30.6 G/DL (ref 32–34.5)
MCV RBC AUTO: 95.3 FL (ref 80–99.9)
MONOCYTES ABSOLUTE: 0.44 K/UL (ref 0.1–0.95)
MONOCYTES RELATIVE PERCENT: 7 % (ref 2–12)
NEUTROPHILS ABSOLUTE: 4.15 K/UL (ref 1.8–7.3)
NEUTROPHILS RELATIVE PERCENT: 69 % (ref 43–80)
PDW BLD-RTO: 14.4 % (ref 11.5–15)
PLATELET # BLD: 283 K/UL (ref 130–450)
PMV BLD AUTO: 11.1 FL (ref 7–12)
POTASSIUM SERPL-SCNC: 4.7 MMOL/L (ref 3.5–5)
RBC # BLD: 4.25 M/UL (ref 3.5–5.5)
SODIUM BLD-SCNC: 143 MMOL/L (ref 132–146)
TOTAL PROTEIN: 7 G/DL (ref 6.4–8.3)
TRIGL SERPL-MCNC: 79 MG/DL
TSH SERPL DL<=0.05 MIU/L-ACNC: 1.69 UIU/ML (ref 0.27–4.2)
VLDLC SERPL CALC-MCNC: 16 MG/DL
WBC # BLD: 6 K/UL (ref 4.5–11.5)

## 2024-08-26 PROCEDURE — 83036 HEMOGLOBIN GLYCOSYLATED A1C: CPT | Performed by: FAMILY MEDICINE

## 2024-08-26 PROCEDURE — 1123F ACP DISCUSS/DSCN MKR DOCD: CPT | Performed by: FAMILY MEDICINE

## 2024-08-26 PROCEDURE — 36415 COLL VENOUS BLD VENIPUNCTURE: CPT | Performed by: FAMILY MEDICINE

## 2024-08-26 PROCEDURE — 1036F TOBACCO NON-USER: CPT | Performed by: FAMILY MEDICINE

## 2024-08-26 PROCEDURE — 82962 GLUCOSE BLOOD TEST: CPT | Performed by: FAMILY MEDICINE

## 2024-08-26 PROCEDURE — G8427 DOCREV CUR MEDS BY ELIG CLIN: HCPCS | Performed by: FAMILY MEDICINE

## 2024-08-26 PROCEDURE — G8417 CALC BMI ABV UP PARAM F/U: HCPCS | Performed by: FAMILY MEDICINE

## 2024-08-26 PROCEDURE — 99214 OFFICE O/P EST MOD 30 MIN: CPT | Performed by: FAMILY MEDICINE

## 2024-08-26 PROCEDURE — 3079F DIAST BP 80-89 MM HG: CPT | Performed by: FAMILY MEDICINE

## 2024-08-26 PROCEDURE — G8400 PT W/DXA NO RESULTS DOC: HCPCS | Performed by: FAMILY MEDICINE

## 2024-08-26 PROCEDURE — 1090F PRES/ABSN URINE INCON ASSESS: CPT | Performed by: FAMILY MEDICINE

## 2024-08-26 PROCEDURE — 3017F COLORECTAL CA SCREEN DOC REV: CPT | Performed by: FAMILY MEDICINE

## 2024-08-26 PROCEDURE — 3075F SYST BP GE 130 - 139MM HG: CPT | Performed by: FAMILY MEDICINE

## 2024-08-26 ASSESSMENT — ENCOUNTER SYMPTOMS
RECTAL PAIN: 0
WHEEZING: 0
EYES NEGATIVE: 1
PHOTOPHOBIA: 0
ABDOMINAL DISTENTION: 0
EYE PAIN: 0
EYE ITCHING: 0
COUGH: 0
EYE REDNESS: 0
ANAL BLEEDING: 0
CONSTIPATION: 0
TROUBLE SWALLOWING: 0
EYE DISCHARGE: 0
ABDOMINAL PAIN: 0
DIARRHEA: 0
FACIAL SWELLING: 0
STRIDOR: 0
SINUS PRESSURE: 0
NAUSEA: 0
VOMITING: 0
BLOOD IN STOOL: 0
SORE THROAT: 0
RHINORRHEA: 0
APNEA: 0
SHORTNESS OF BREATH: 0
GASTROINTESTINAL NEGATIVE: 1
BACK PAIN: 1
CHEST TIGHTNESS: 0
CHOKING: 0
VOICE CHANGE: 0
ALLERGIC/IMMUNOLOGIC NEGATIVE: 1
COLOR CHANGE: 0
SINUS PAIN: 0

## 2024-08-26 NOTE — PROGRESS NOTES
SUBJECTIVE  Katie Garcia is a 68 y.o. female.    HPI/Chief C/O:  Chief Complaint   Patient presents with    Pre-op Exam     Pt here for Pre-op Exam, needs medical clearance only. Having a left Total Knee done by Dr Mcdonnell at [facility].     Pt will be seeing her cardiologist (Dr Tyson) tomorrow for her cardiac clearance.    Other     Pt had her Protime checked on 8/5/24 and is due again 9/9/24.     No Known Allergies    This 68 year old female presents for pre-op exam for left TKR on 9/18/2024 with Dr. Mcdonnell.  Pt has atrial fib. (HCC), DVT (HCC), and OA. A1C is 5.6. pt has cardiology appointment on 8/27/2024 for cardiac clearance. Pt instructed to hold coumadin 4 days before surgery, and resume day after surgery. Pt instructed to stop advil and vit. 1 week before surgery. Pt states she has not been taking much advil. Pt denies chest pain and denies shortness of breath.   ROS:  Review of Systems   Constitutional:  Positive for activity change and fatigue. Negative for appetite change, chills, diaphoresis, fever and unexpected weight change.   HENT:  Negative for congestion, dental problem, drooling, ear discharge, ear pain, facial swelling, hearing loss, mouth sores, nosebleeds, postnasal drip, rhinorrhea, sinus pressure, sinus pain, sneezing, sore throat, tinnitus, trouble swallowing and voice change.    Eyes: Negative.  Negative for photophobia, pain, discharge, redness, itching and visual disturbance.   Respiratory:  Negative for apnea, cough, choking, chest tightness, shortness of breath, wheezing and stridor.    Cardiovascular:  Positive for leg swelling. Negative for chest pain and palpitations.   Gastrointestinal: Negative.  Negative for abdominal distention, abdominal pain, anal bleeding, blood in stool, constipation, diarrhea, nausea, rectal pain and vomiting.   Endocrine: Negative.  Negative for cold intolerance, heat intolerance, polydipsia, polyphagia and polyuria.   Genitourinary: Negative.   Pt has chronic atrial fib. (HCC). With irregular rythythm. Pt has varicose veins maggie. Lower extremities.  Pulmonary:      Effort: Pulmonary effort is normal. No respiratory distress.      Breath sounds: Normal breath sounds. No stridor. No wheezing, rhonchi or rales.   Chest:      Chest wall: No tenderness.   Abdominal:      General: Bowel sounds are normal. There is no distension.      Palpations: Abdomen is soft. There is no mass.      Tenderness: There is no abdominal tenderness. There is no right CVA tenderness, left CVA tenderness, guarding or rebound.      Hernia: No hernia is present.   Musculoskeletal:         General: Tenderness present. No swelling, deformity or signs of injury.      Right shoulder: No swelling, deformity, effusion, laceration, bony tenderness or crepitus. Normal strength. Normal pulse.      Cervical back: Normal range of motion and neck supple. No rigidity or tenderness. No muscular tenderness.      Right lower leg: Edema (venous stasis dermatosis) present.      Left lower leg: Edema (venous stasis dermatosis) present.      Comments: Pt has left knee pain.      Lymphadenopathy:      Cervical: No cervical adenopathy.   Skin:     General: Skin is warm.      Coloration: Skin is not jaundiced or pale.      Findings: No bruising, erythema, lesion or rash.   Neurological:      General: No focal deficit present.      Mental Status: She is alert and oriented to person, place, and time.      Cranial Nerves: No cranial nerve deficit.      Sensory: No sensory deficit.      Motor: No weakness or abnormal muscle tone.      Coordination: Coordination normal.      Gait: Gait normal.      Deep Tendon Reflexes: Reflexes are normal and symmetric. Reflexes normal.   Psychiatric:         Mood and Affect: Mood normal.         Behavior: Behavior normal.         Thought Content: Thought content normal.         Judgment: Judgment normal.         ASSESSMENT/PLAN  Katie was seen today for pre-op exam and

## 2024-08-26 NOTE — PROGRESS NOTES
Venipuncture was obtained from right arm, with 1 attempt. Patient tolerated the procedure without complications or complaints.  Electronically signed by PAULA CORTES LPN on 8/26/24 at 11:13 AM EDT   Last Appointment:  5/8/2024  Future Appointments   Date Time Provider Department Center   9/9/2024  1:30 PM SCHEDULE, CU[facility]   11/4/2024 11:00 AM Bing Mcclain,  [facility]

## 2024-08-28 ENCOUNTER — TELEPHONE (OUTPATIENT)
Dept: ORTHOPEDIC SURGERY | Age: 69
End: 2024-08-28

## 2024-08-28 ENCOUNTER — PREP FOR PROCEDURE (OUTPATIENT)
Dept: ORTHOPEDIC SURGERY | Age: 69
End: 2024-08-28

## 2024-08-28 PROBLEM — M17.12 LEFT KNEE DJD: Status: ACTIVE | Noted: 2024-08-28

## 2024-08-28 NOTE — TELEPHONE ENCOUNTER
Prior Authorization Form:      DEMOGRAPHICS:                     Patient Name:  Katie Garcia  Patient :  1955            Insurance:  Payor: MEDICARE / Plan: MEDICARE PART A AND B / Product Type: *No Product type* /   Insurance ID Number:    Payer/Plan Subscr  Sex Relation Sub. Ins. ID Effective Group Num   1. MEDICARE - MEKATIE HAYS 1955 Female Self [id number]                                    [address]. [employer] [place] 1955 Female Self [id number]                                    [id number]         DIAGNOSIS & PROCEDURE:                       Procedure/Operation: LEFT KNEE TOTAL KNEE ARTHROPLASTY           CPT Code: 38560    Diagnosis:  LEFT KNEE DJD    ICD10 Code: 94642    Location:  [facility]    Surgeon:  ARACELIS HENRIQUEZ    SCHEDULING INFORMATION:                          Date: 24    Time: TO FOLLOW              Anesthesia:  Spinal                                                       Status:  Outpatient        Special Comments:  RAS AND NEPHEW       Electronically signed by Fatou Fleming ATC on 2024 at 3:41 PM UT

## 2024-08-30 NOTE — PROGRESS NOTES
Last Appointment:  5/8/2024  Future Appointments   Date Time Provider Department Center   9/9/2024  1:30 PM SCHEDULE, [facility] DEP   9/12/2024 11:00 AM JING PAT ROOM 2 [facility]   10/3/2024  9:50 AM Faraht Mcdonnell DO [facility]   11/4/2024 11:00 AM Bing Mcclain,  [facility] E[facility] DEP

## 2024-08-30 NOTE — PROGRESS NOTES
Last Appointment:  5/8/2024  Future Appointments   Date Time Provider Department Center   9/9/2024  1:30 PM SCHEDULE, [facility] DEP   9/12/2024 11:00 AM SJWJASKARAN PAT ROOM 2 [facility] [place]   10/3/2024  9:50 AM Farhat Mcdonnell, DO [facility]   11/4/2024 11:00 AM Bing Mcclain, DO [facility] DE[facility]      This Nurse called and spoke with Patient to find out if she had seen and if she had been given cardiac clearance from Dr Abdiel costello on Tuesday 8/27/24. Pt states she did have the appt and was cleared for surgery, the forms will be filled out and forwarded by his office. This Nurse faxed the Medical clearance pre-op exam notes and surgical clearance forms today.  Electronically signed by PAULA CORTES LPN on 8/30/24 at 3:32 PM EDT

## 2024-09-09 ENCOUNTER — NURSE ONLY (OUTPATIENT)
Dept: FAMILY MEDICINE CLINIC | Age: 69
End: 2024-09-09

## 2024-09-09 DIAGNOSIS — I82.462 ACUTE DEEP VEIN THROMBOSIS (DVT) OF CALF MUSCLE VEIN OF LEFT LOWER EXTREMITY (HCC): Primary | ICD-10-CM

## 2024-09-09 DIAGNOSIS — I48.0 PAROXYSMAL ATRIAL FIBRILLATION (HCC): ICD-10-CM

## 2024-09-09 LAB
INTERNATIONAL NORMALIZATION RATIO, POC: 2.4
PROTHROMBIN TIME, POC: NORMAL

## 2024-09-11 RX ORDER — SODIUM CHLORIDE, SODIUM LACTATE, POTASSIUM CHLORIDE, CALCIUM CHLORIDE 600; 310; 30; 20 MG/100ML; MG/100ML; MG/100ML; MG/100ML
INJECTION, SOLUTION INTRAVENOUS CONTINUOUS
OUTPATIENT
Start: 2024-09-18

## 2024-09-13 ENCOUNTER — HOSPITAL ENCOUNTER (OUTPATIENT)
Dept: PREADMISSION TESTING | Age: 69
Discharge: HOME | End: 2024-09-13
Payer: MEDICARE

## 2024-09-13 ENCOUNTER — ANESTHESIA EVENT (OUTPATIENT)
Dept: OPERATING ROOM | Age: 69
End: 2024-09-13
Payer: MEDICARE

## 2024-09-13 VITALS
TEMPERATURE: 97.5 F | HEIGHT: 67 IN | OXYGEN SATURATION: 97 % | DIASTOLIC BLOOD PRESSURE: 80 MMHG | SYSTOLIC BLOOD PRESSURE: 118 MMHG | HEART RATE: 80 BPM | BODY MASS INDEX: 38.92 KG/M2 | WEIGHT: 248 LBS | RESPIRATION RATE: 16 BRPM

## 2024-09-13 DIAGNOSIS — Z01.818 PRE-OP EVALUATION: ICD-10-CM

## 2024-09-13 LAB
ALBUMIN SERPL-MCNC: 4.3 G/DL (ref 3.5–5.2)
ALP SERPL-CCNC: 60 U/L (ref 35–104)
ALT SERPL-CCNC: 12 U/L (ref 0–32)
ANION GAP SERPL CALCULATED.3IONS-SCNC: 8 MMOL/L (ref 7–16)
AST SERPL-CCNC: 18 U/L (ref 0–31)
BASOPHILS # BLD: 0.05 K/UL (ref 0–0.2)
BASOPHILS NFR BLD: 1 % (ref 0–2)
BILIRUB SERPL-MCNC: 0.3 MG/DL (ref 0–1.2)
BILIRUB UR QL STRIP: NEGATIVE
BUN SERPL-MCNC: 13 MG/DL (ref 6–23)
CALCIUM SERPL-MCNC: 9.4 MG/DL (ref 8.6–10.2)
CHLORIDE SERPL-SCNC: 104 MMOL/L (ref 98–107)
CLARITY UR: CLEAR
CO2 SERPL-SCNC: 29 MMOL/L (ref 22–29)
COLOR UR: YELLOW
COMMENT: NORMAL
CREAT SERPL-MCNC: 0.7 MG/DL (ref 0.5–1)
EOSINOPHIL # BLD: 0.1 K/UL (ref 0.05–0.5)
EOSINOPHILS RELATIVE PERCENT: 2 % (ref 0–6)
ERYTHROCYTE [DISTWIDTH] IN BLOOD BY AUTOMATED COUNT: 13.3 % (ref 11.5–15)
GFR, ESTIMATED: 90 ML/MIN/1.73M2
GLUCOSE SERPL-MCNC: 104 MG/DL (ref 74–99)
GLUCOSE UR STRIP-MCNC: NEGATIVE MG/DL
HBA1C MFR BLD: 5.6 % (ref 4–5.6)
HCT VFR BLD AUTO: 40.8 % (ref 34–48)
HGB BLD-MCNC: 13 G/DL (ref 11.5–15.5)
HGB UR QL STRIP.AUTO: NEGATIVE
IMM GRANULOCYTES # BLD AUTO: <0.03 K/UL (ref 0–0.58)
IMM GRANULOCYTES NFR BLD: 0 % (ref 0–5)
INR PPP: 3
KETONES UR STRIP-MCNC: NEGATIVE MG/DL
LEUKOCYTE ESTERASE UR QL STRIP: NEGATIVE
LYMPHOCYTES NFR BLD: 1.35 K/UL (ref 1.5–4)
LYMPHOCYTES RELATIVE PERCENT: 21 % (ref 20–42)
MCH RBC QN AUTO: 28.9 PG (ref 26–35)
MCHC RBC AUTO-ENTMCNC: 31.9 G/DL (ref 32–34.5)
MCV RBC AUTO: 90.7 FL (ref 80–99.9)
MONOCYTES NFR BLD: 0.4 K/UL (ref 0.1–0.95)
MONOCYTES NFR BLD: 6 % (ref 2–12)
NEUTROPHILS NFR BLD: 70 % (ref 43–80)
NEUTS SEG NFR BLD: 4.39 K/UL (ref 1.8–7.3)
NITRITE UR QL STRIP: NEGATIVE
PARTIAL THROMBOPLASTIN TIME: 45.5 SEC (ref 24.5–35.1)
PH UR STRIP: 7 [PH] (ref 5–9)
PLATELET # BLD AUTO: 290 K/UL (ref 130–450)
PMV BLD AUTO: 10.6 FL (ref 7–12)
POTASSIUM SERPL-SCNC: 4.4 MMOL/L (ref 3.5–5)
PREALB SERPL-MCNC: 21 MG/DL (ref 20–40)
PROT SERPL-MCNC: 7.2 G/DL (ref 6.4–8.3)
PROT UR STRIP-MCNC: NEGATIVE MG/DL
PROTHROMBIN TIME: 32.8 SEC (ref 9.3–12.4)
RBC # BLD AUTO: 4.5 M/UL (ref 3.5–5.5)
SODIUM SERPL-SCNC: 141 MMOL/L (ref 132–146)
SP GR UR STRIP: 1.01 (ref 1–1.03)
UROBILINOGEN UR STRIP-ACNC: 0.2 EU/DL (ref 0–1)
WBC OTHER # BLD: 6.3 K/UL (ref 4.5–11.5)

## 2024-09-13 PROCEDURE — 83036 HEMOGLOBIN GLYCOSYLATED A1C: CPT

## 2024-09-13 PROCEDURE — 84134 ASSAY OF PREALBUMIN: CPT

## 2024-09-13 PROCEDURE — 80053 COMPREHEN METABOLIC PANEL: CPT

## 2024-09-13 PROCEDURE — 87086 URINE CULTURE/COLONY COUNT: CPT

## 2024-09-13 PROCEDURE — 87081 CULTURE SCREEN ONLY: CPT

## 2024-09-13 PROCEDURE — 81003 URINALYSIS AUTO W/O SCOPE: CPT

## 2024-09-13 PROCEDURE — 85025 COMPLETE CBC W/AUTO DIFF WBC: CPT

## 2024-09-13 PROCEDURE — 85610 PROTHROMBIN TIME: CPT

## 2024-09-13 PROCEDURE — 85730 THROMBOPLASTIN TIME PARTIAL: CPT

## 2024-09-13 RX ORDER — FLUTICASONE PROPIONATE 50 MCG
1 SPRAY, SUSPENSION (ML) NASAL PRN
COMMUNITY

## 2024-09-14 LAB
MICROORGANISM SPEC CULT: NO GROWTH
MICROORGANISM SPEC CULT: NORMAL
SERVICE CMNT-IMP: NORMAL
SERVICE CMNT-IMP: NORMAL
SPECIMEN DESCRIPTION: NORMAL
SPECIMEN DESCRIPTION: NORMAL

## 2024-09-16 ASSESSMENT — PROMIS GLOBAL HEALTH SCALE
IN GENERAL, HOW WOULD YOU RATE YOUR SATISFACTION WITH YOUR SOCIAL ACTIVITIES AND RELATIONSHIPS [ON A SCALE OF 1 (POOR) TO 5 (EXCELLENT)]?: EXCELLENT
WHO IS THE PERSON COMPLETING THE PROMIS V1.1 SURVEY?: SELF
IN GENERAL, PLEASE RATE HOW WELL YOU CARRY OUT YOUR USUAL SOCIAL ACTIVITIES (INCLUDES ACTIVITIES AT HOME, AT WORK, AND IN YOUR COMMUNITY, AND RESPONSIBILITIES AS A PARENT, CHILD, SPOUSE, EMPLOYEE, FRIEND, ETC) [ON A SCALE OF 1 (POOR) TO 5 (EXCELLENT)]?: VERY GOOD
TO WHAT EXTENT ARE YOU ABLE TO CARRY OUT YOUR EVERYDAY PHYSICAL ACTIVITIES SUCH AS WALKING, CLIMBING STAIRS, CARRYING GROCERIES, OR MOVING A CHAIR [ON A SCALE OF 1 (NOT AT ALL) TO 5 (COMPLETELY)]?: MODERATELY
IN THE PAST 7 DAYS, HOW WOULD YOU RATE YOUR FATIGUE ON AVERAGE [ON A SCALE FROM 1 (NONE) TO 5 (VERY SEVERE)]?: MILD
IN THE PAST 7 DAYS, HOW OFTEN HAVE YOU BEEN BOTHERED BY EMOTIONAL PROBLEMS, SUCH AS FEELING ANXIOUS, DEPRESSED, OR IRRITABLE [ON A SCALE FROM 1 (NEVER) TO 5 (ALWAYS)]?: RARELY
IN GENERAL, WOULD YOU SAY YOUR QUALITY OF LIFE IS...[ON A SCALE OF 1 (POOR) TO 5 (EXCELLENT)]: EXCELLENT
IN GENERAL, WOULD YOU SAY YOUR HEALTH IS...[ON A SCALE OF 1 (POOR) TO 5 (EXCELLENT)]: VERY GOOD
SUM OF RESPONSES TO QUESTIONS 3, 6, 7, & 8: 18
IN GENERAL, HOW WOULD YOU RATE YOUR PHYSICAL HEALTH [ON A SCALE OF 1 (POOR) TO 5 (EXCELLENT)]?: VERY GOOD
SUM OF RESPONSES TO QUESTIONS 2, 4, 5, & 10: 19
IN THE PAST 7 DAYS, HOW WOULD YOU RATE YOUR PAIN ON AVERAGE [ON A SCALE FROM 0 (NO PAIN) TO 10 (WORST IMAGINABLE PAIN)]?: 7
HOW IS THE PROMIS V1.1 BEING ADMINISTERED?: PAPER
IN GENERAL, HOW WOULD YOU RATE YOUR MENTAL HEALTH, INCLUDING YOUR MOOD AND YOUR ABILITY TO THINK [ON A SCALE OF 1 (POOR) TO 5 (EXCELLENT)]?: EXCELLENT

## 2024-09-16 ASSESSMENT — KOOS JR
HOW SEVERE IS YOUR KNEE STIFFNESS AFTER FIRST WAKING IN MORNING: MILD
GOING UP OR DOWN STAIRS: MODERATE
STANDING UPRIGHT: MODERATE
RISING FROM SITTING: EXTREME
TWISING OR PIVOTING ON KNEE: MODERATE
BENDING TO THE FLOOR TO PICK UP OBJECT: SEVERE
KOOS JR TOTAL INTERVAL SCORE: 47.487
STRAIGHTENING KNEE FULLY: MODERATE

## 2024-09-18 ENCOUNTER — APPOINTMENT (OUTPATIENT)
Dept: GENERAL RADIOLOGY | Age: 69
End: 2024-09-18
Attending: ORTHOPAEDIC SURGERY
Payer: MEDICARE

## 2024-09-18 ENCOUNTER — HOSPITAL ENCOUNTER (OUTPATIENT)
Age: 69
Setting detail: OUTPATIENT SURGERY
Discharge: HOME | End: 2024-09-18
Attending: ORTHOPAEDIC SURGERY | Admitting: ORTHOPAEDIC SURGERY
Payer: MEDICARE

## 2024-09-18 ENCOUNTER — ANESTHESIA (OUTPATIENT)
Dept: OPERATING ROOM | Age: 69
End: 2024-09-18
Payer: MEDICARE

## 2024-09-18 VITALS
DIASTOLIC BLOOD PRESSURE: 84 MMHG | TEMPERATURE: 97.1 F | RESPIRATION RATE: 18 BRPM | WEIGHT: 248 LBS | BODY MASS INDEX: 38.92 KG/M2 | HEART RATE: 80 BPM | HEIGHT: 67 IN | OXYGEN SATURATION: 98 % | SYSTOLIC BLOOD PRESSURE: 136 MMHG

## 2024-09-18 DIAGNOSIS — Z96.652 S/P TOTAL KNEE ARTHROPLASTY, LEFT: ICD-10-CM

## 2024-09-18 DIAGNOSIS — M17.12 LEFT KNEE DJD: ICD-10-CM

## 2024-09-18 DIAGNOSIS — Z01.818 PRE-OP EVALUATION: Primary | ICD-10-CM

## 2024-09-18 LAB
INR PPP: 1.2
PARTIAL THROMBOPLASTIN TIME: 30.2 SEC (ref 24.5–35.1)
PROTHROMBIN TIME: 12.8 SEC (ref 9.3–12.4)

## 2024-09-18 PROCEDURE — 2580000003 HC RX 258

## 2024-09-18 PROCEDURE — 3700000001 HC ADD 15 MINUTES (ANESTHESIA): Performed by: ORTHOPAEDIC SURGERY

## 2024-09-18 PROCEDURE — 6360000002 HC RX W HCPCS

## 2024-09-18 PROCEDURE — 88304 TISSUE EXAM BY PATHOLOGIST: CPT

## 2024-09-18 PROCEDURE — 2500000003 HC RX 250 WO HCPCS: Performed by: ORTHOPAEDIC SURGERY

## 2024-09-18 PROCEDURE — 3600000005 HC SURGERY LEVEL 5 BASE: Performed by: ORTHOPAEDIC SURGERY

## 2024-09-18 PROCEDURE — 6360000002 HC RX W HCPCS: Performed by: ORTHOPAEDIC SURGERY

## 2024-09-18 PROCEDURE — 6360000002 HC RX W HCPCS: Performed by: ANESTHESIOLOGY

## 2024-09-18 PROCEDURE — 2500000003 HC RX 250 WO HCPCS

## 2024-09-18 PROCEDURE — C1776 JOINT DEVICE (IMPLANTABLE): HCPCS | Performed by: ORTHOPAEDIC SURGERY

## 2024-09-18 PROCEDURE — 27447 TOTAL KNEE ARTHROPLASTY: CPT | Performed by: ORTHOPAEDIC SURGERY

## 2024-09-18 PROCEDURE — 3700000000 HC ANESTHESIA ATTENDED CARE: Performed by: ORTHOPAEDIC SURGERY

## 2024-09-18 PROCEDURE — 7100000010 HC PHASE II RECOVERY - FIRST 15 MIN: Performed by: ORTHOPAEDIC SURGERY

## 2024-09-18 PROCEDURE — 7100000011 HC PHASE II RECOVERY - ADDTL 15 MIN: Performed by: ORTHOPAEDIC SURGERY

## 2024-09-18 PROCEDURE — 97110 THERAPEUTIC EXERCISES: CPT | Performed by: PHYSICAL THERAPIST

## 2024-09-18 PROCEDURE — 64447 NJX AA&/STRD FEMORAL NRV IMG: CPT | Performed by: ANESTHESIOLOGY

## 2024-09-18 PROCEDURE — A4217 STERILE WATER/SALINE, 500 ML: HCPCS | Performed by: ORTHOPAEDIC SURGERY

## 2024-09-18 PROCEDURE — 88311 DECALCIFY TISSUE: CPT

## 2024-09-18 PROCEDURE — 6360000002 HC RX W HCPCS: Performed by: NURSE ANESTHETIST, CERTIFIED REGISTERED

## 2024-09-18 PROCEDURE — 3600000015 HC SURGERY LEVEL 5 ADDTL 15MIN: Performed by: ORTHOPAEDIC SURGERY

## 2024-09-18 PROCEDURE — 97165 OT EVAL LOW COMPLEX 30 MIN: CPT

## 2024-09-18 PROCEDURE — 2580000003 HC RX 258: Performed by: ANESTHESIOLOGY

## 2024-09-18 PROCEDURE — 85730 THROMBOPLASTIN TIME PARTIAL: CPT

## 2024-09-18 PROCEDURE — 7100000001 HC PACU RECOVERY - ADDTL 15 MIN: Performed by: ORTHOPAEDIC SURGERY

## 2024-09-18 PROCEDURE — 73560 X-RAY EXAM OF KNEE 1 OR 2: CPT

## 2024-09-18 PROCEDURE — 85610 PROTHROMBIN TIME: CPT

## 2024-09-18 PROCEDURE — 6370000000 HC RX 637 (ALT 250 FOR IP): Performed by: ORTHOPAEDIC SURGERY

## 2024-09-18 PROCEDURE — 7100000000 HC PACU RECOVERY - FIRST 15 MIN: Performed by: ORTHOPAEDIC SURGERY

## 2024-09-18 PROCEDURE — 2709999900 HC NON-CHARGEABLE SUPPLY: Performed by: ORTHOPAEDIC SURGERY

## 2024-09-18 PROCEDURE — 6370000000 HC RX 637 (ALT 250 FOR IP)

## 2024-09-18 PROCEDURE — 97161 PT EVAL LOW COMPLEX 20 MIN: CPT | Performed by: PHYSICAL THERAPIST

## 2024-09-18 PROCEDURE — 97530 THERAPEUTIC ACTIVITIES: CPT | Performed by: PHYSICAL THERAPIST

## 2024-09-18 PROCEDURE — 2580000003 HC RX 258: Performed by: ORTHOPAEDIC SURGERY

## 2024-09-18 PROCEDURE — 97535 SELF CARE MNGMENT TRAINING: CPT

## 2024-09-18 DEVICE — POROUS ROUND PATELLA SZ 26MM
Type: IMPLANTABLE DEVICE | Site: KNEE | Status: FUNCTIONAL
Brand: PORK K-15

## 2024-09-18 DEVICE — LEGION POROUS CRUCIATE RETAINING                                    FEMORAL SIZE 5 LEFT
Type: IMPLANTABLE DEVICE | Site: KNEE | Status: FUNCTIONAL
Brand: LEGION

## 2024-09-18 DEVICE — POROUS TIBIA BASEPLATE W/JRNY LOCK                                    SZ 4 LEFT
Type: IMPLANTABLE DEVICE | Site: KNEE | Status: FUNCTIONAL
Brand: K-15 PORK

## 2024-09-18 DEVICE — IMPLANTABLE DEVICE: Type: IMPLANTABLE DEVICE | Site: KNEE | Status: FUNCTIONAL

## 2024-09-18 RX ORDER — PREGABALIN 25 MG/1
25 CAPSULE ORAL 2 TIMES DAILY
Qty: 56 CAPSULE | Refills: 0 | Status: SHIPPED | OUTPATIENT
Start: 2024-09-18 | End: 2024-10-16

## 2024-09-18 RX ORDER — MORPHINE SULFATE 10 MG/ML
2 INJECTION, SOLUTION INTRAMUSCULAR; INTRAVENOUS
Status: DISCONTINUED | OUTPATIENT
Start: 2024-09-18 | End: 2024-09-18 | Stop reason: HOSPADM

## 2024-09-18 RX ORDER — BUPIVACAINE HYDROCHLORIDE 7.5 MG/ML
INJECTION, SOLUTION INTRASPINAL
Status: COMPLETED | OUTPATIENT
Start: 2024-09-18 | End: 2024-09-18

## 2024-09-18 RX ORDER — OXYCODONE HYDROCHLORIDE 5 MG/1
5 TABLET ORAL EVERY 6 HOURS PRN
Qty: 28 TABLET | Refills: 0 | Status: SHIPPED | OUTPATIENT
Start: 2024-09-18 | End: 2024-09-25

## 2024-09-18 RX ORDER — WARFARIN SODIUM 1 MG/1
1 TABLET ORAL DAILY
Status: CANCELLED | OUTPATIENT
Start: 2024-09-18

## 2024-09-18 RX ORDER — ACETAMINOPHEN 325 MG/1
650 TABLET ORAL EVERY 6 HOURS
Status: DISCONTINUED | OUTPATIENT
Start: 2024-09-19 | End: 2024-09-18 | Stop reason: HOSPADM

## 2024-09-18 RX ORDER — PROPOFOL 10 MG/ML
INJECTION, EMULSION INTRAVENOUS
Status: DISCONTINUED | OUTPATIENT
Start: 2024-09-18 | End: 2024-09-18 | Stop reason: SDUPTHER

## 2024-09-18 RX ORDER — SODIUM CHLORIDE 0.9 % (FLUSH) 0.9 %
5-40 SYRINGE (ML) INJECTION EVERY 12 HOURS SCHEDULED
Status: DISCONTINUED | OUTPATIENT
Start: 2024-09-18 | End: 2024-09-18 | Stop reason: HOSPADM

## 2024-09-18 RX ORDER — FENTANYL CITRATE 50 UG/ML
INJECTION, SOLUTION INTRAMUSCULAR; INTRAVENOUS
Status: DISCONTINUED | OUTPATIENT
Start: 2024-09-18 | End: 2024-09-18 | Stop reason: SDUPTHER

## 2024-09-18 RX ORDER — FLUTICASONE PROPIONATE 50 MCG
1 SPRAY, SUSPENSION (ML) NASAL PRN
Status: CANCELLED | OUTPATIENT
Start: 2024-09-18

## 2024-09-18 RX ORDER — FENTANYL CITRATE 50 UG/ML
INJECTION, SOLUTION INTRAMUSCULAR; INTRAVENOUS
Status: COMPLETED | OUTPATIENT
Start: 2024-09-18 | End: 2024-09-18

## 2024-09-18 RX ORDER — ASPIRIN 325 MG
325 TABLET, DELAYED RELEASE (ENTERIC COATED) ORAL 2 TIMES DAILY
Status: DISCONTINUED | OUTPATIENT
Start: 2024-09-19 | End: 2024-09-18 | Stop reason: HOSPADM

## 2024-09-18 RX ORDER — SODIUM CHLORIDE 9 MG/ML
INJECTION, SOLUTION INTRAVENOUS PRN
Status: DISCONTINUED | OUTPATIENT
Start: 2024-09-18 | End: 2024-09-18 | Stop reason: HOSPADM

## 2024-09-18 RX ORDER — MIDAZOLAM HYDROCHLORIDE 1 MG/ML
2 INJECTION INTRAMUSCULAR; INTRAVENOUS ONCE
Status: COMPLETED | OUTPATIENT
Start: 2024-09-18 | End: 2024-09-18

## 2024-09-18 RX ORDER — MIDAZOLAM HYDROCHLORIDE 1 MG/ML
INJECTION INTRAMUSCULAR; INTRAVENOUS
Status: COMPLETED
Start: 2024-09-18 | End: 2024-09-18

## 2024-09-18 RX ORDER — MIDAZOLAM HYDROCHLORIDE 1 MG/ML
1 INJECTION INTRAMUSCULAR; INTRAVENOUS
Status: DISCONTINUED | OUTPATIENT
Start: 2024-09-18 | End: 2024-09-18 | Stop reason: HOSPADM

## 2024-09-18 RX ORDER — METHOCARBAMOL 100 MG/ML
1000 INJECTION, SOLUTION INTRAMUSCULAR; INTRAVENOUS
Status: DISCONTINUED | OUTPATIENT
Start: 2024-09-18 | End: 2024-09-18 | Stop reason: HOSPADM

## 2024-09-18 RX ORDER — SODIUM CHLORIDE 0.9 % (FLUSH) 0.9 %
5-40 SYRINGE (ML) INJECTION PRN
Status: DISCONTINUED | OUTPATIENT
Start: 2024-09-18 | End: 2024-09-18 | Stop reason: HOSPADM

## 2024-09-18 RX ORDER — FENTANYL CITRATE 0.05 MG/ML
50 INJECTION, SOLUTION INTRAMUSCULAR; INTRAVENOUS EVERY 5 MIN PRN
Status: DISCONTINUED | OUTPATIENT
Start: 2024-09-18 | End: 2024-09-18 | Stop reason: HOSPADM

## 2024-09-18 RX ORDER — OXYCODONE HYDROCHLORIDE 5 MG/1
10 TABLET ORAL EVERY 4 HOURS PRN
Status: DISCONTINUED | OUTPATIENT
Start: 2024-09-18 | End: 2024-09-18 | Stop reason: HOSPADM

## 2024-09-18 RX ORDER — KETOROLAC TROMETHAMINE 30 MG/ML
30 INJECTION, SOLUTION INTRAMUSCULAR; INTRAVENOUS EVERY 6 HOURS PRN
Status: DISCONTINUED | OUTPATIENT
Start: 2024-09-18 | End: 2024-09-18 | Stop reason: HOSPADM

## 2024-09-18 RX ORDER — BACLOFEN 10 MG/1
10 TABLET ORAL EVERY EVENING
Status: CANCELLED | OUTPATIENT
Start: 2024-09-18

## 2024-09-18 RX ORDER — CEPHALEXIN 500 MG/1
500 CAPSULE ORAL 3 TIMES DAILY
Qty: 10 CAPSULE | Refills: 0 | Status: SHIPPED | OUTPATIENT
Start: 2024-09-18 | End: 2024-09-22

## 2024-09-18 RX ORDER — NALOXONE HYDROCHLORIDE 0.4 MG/ML
INJECTION, SOLUTION INTRAMUSCULAR; INTRAVENOUS; SUBCUTANEOUS PRN
Status: DISCONTINUED | OUTPATIENT
Start: 2024-09-18 | End: 2024-09-18 | Stop reason: HOSPADM

## 2024-09-18 RX ORDER — TORSEMIDE 10 MG/1
10 TABLET ORAL DAILY
Status: CANCELLED | OUTPATIENT
Start: 2024-09-18

## 2024-09-18 RX ORDER — ONDANSETRON 2 MG/ML
4 INJECTION INTRAMUSCULAR; INTRAVENOUS EVERY 6 HOURS PRN
Status: DISCONTINUED | OUTPATIENT
Start: 2024-09-18 | End: 2024-09-18 | Stop reason: HOSPADM

## 2024-09-18 RX ORDER — SCOLOPAMINE TRANSDERMAL SYSTEM 1 MG/1
1 PATCH, EXTENDED RELEASE TRANSDERMAL
Status: DISCONTINUED | OUTPATIENT
Start: 2024-09-18 | End: 2024-09-18 | Stop reason: HOSPADM

## 2024-09-18 RX ORDER — MAGNESIUM HYDROXIDE 1200 MG/15ML
LIQUID ORAL CONTINUOUS PRN
Status: COMPLETED | OUTPATIENT
Start: 2024-09-18 | End: 2024-09-18

## 2024-09-18 RX ORDER — PROCHLORPERAZINE EDISYLATE 5 MG/ML
5 INJECTION INTRAMUSCULAR; INTRAVENOUS
Status: DISCONTINUED | OUTPATIENT
Start: 2024-09-18 | End: 2024-09-18 | Stop reason: HOSPADM

## 2024-09-18 RX ORDER — DEXAMETHASONE SODIUM PHOSPHATE 10 MG/ML
8 INJECTION, SOLUTION INTRAMUSCULAR; INTRAVENOUS ONCE
Status: COMPLETED | OUTPATIENT
Start: 2024-09-18 | End: 2024-09-18

## 2024-09-18 RX ORDER — ONDANSETRON 4 MG/1
4 TABLET, ORALLY DISINTEGRATING ORAL EVERY 8 HOURS PRN
Status: DISCONTINUED | OUTPATIENT
Start: 2024-09-18 | End: 2024-09-18 | Stop reason: HOSPADM

## 2024-09-18 RX ORDER — ROPIVACAINE HYDROCHLORIDE 5 MG/ML
INJECTION, SOLUTION EPIDURAL; INFILTRATION; PERINEURAL
Status: COMPLETED | OUTPATIENT
Start: 2024-09-18 | End: 2024-09-18

## 2024-09-18 RX ORDER — ROPIVACAINE HYDROCHLORIDE 5 MG/ML
INJECTION, SOLUTION EPIDURAL; INFILTRATION; PERINEURAL
Status: DISCONTINUED
Start: 2024-09-18 | End: 2024-09-18 | Stop reason: HOSPADM

## 2024-09-18 RX ORDER — VANCOMYCIN HYDROCHLORIDE 1 G/20ML
INJECTION, POWDER, LYOPHILIZED, FOR SOLUTION INTRAVENOUS PRN
Status: DISCONTINUED | OUTPATIENT
Start: 2024-09-18 | End: 2024-09-18 | Stop reason: ALTCHOICE

## 2024-09-18 RX ORDER — SODIUM CHLORIDE, SODIUM LACTATE, POTASSIUM CHLORIDE, CALCIUM CHLORIDE 600; 310; 30; 20 MG/100ML; MG/100ML; MG/100ML; MG/100ML
INJECTION, SOLUTION INTRAVENOUS CONTINUOUS
Status: DISCONTINUED | OUTPATIENT
Start: 2024-09-18 | End: 2024-09-18 | Stop reason: HOSPADM

## 2024-09-18 RX ORDER — WARFARIN SODIUM 2.5 MG/1
2.5 TABLET ORAL DAILY
Status: CANCELLED | OUTPATIENT
Start: 2024-09-19

## 2024-09-18 RX ORDER — FENTANYL CITRATE 50 UG/ML
100 INJECTION, SOLUTION INTRAMUSCULAR; INTRAVENOUS ONCE
Status: COMPLETED | OUTPATIENT
Start: 2024-09-18 | End: 2024-09-18

## 2024-09-18 RX ORDER — CHOLECALCIFEROL (VITAMIN D3) 50 MCG
2000 TABLET ORAL DAILY
Status: CANCELLED | OUTPATIENT
Start: 2024-09-18

## 2024-09-18 RX ORDER — CELECOXIB 100 MG/1
100 CAPSULE ORAL ONCE
Status: COMPLETED | OUTPATIENT
Start: 2024-09-18 | End: 2024-09-18

## 2024-09-18 RX ORDER — FENTANYL CITRATE 50 UG/ML
INJECTION, SOLUTION INTRAMUSCULAR; INTRAVENOUS
Status: COMPLETED
Start: 2024-09-18 | End: 2024-09-18

## 2024-09-18 RX ORDER — MELOXICAM 7.5 MG/1
3.75 TABLET ORAL DAILY
Status: DISCONTINUED | OUTPATIENT
Start: 2024-09-18 | End: 2024-09-18 | Stop reason: HOSPADM

## 2024-09-18 RX ORDER — ACETAMINOPHEN 500 MG
1000 TABLET ORAL ONCE
Status: COMPLETED | OUTPATIENT
Start: 2024-09-18 | End: 2024-09-18

## 2024-09-18 RX ORDER — NADOLOL 20 MG/1
20 TABLET ORAL DAILY
Status: CANCELLED | OUTPATIENT
Start: 2024-09-18

## 2024-09-18 RX ORDER — DEXTROSE MONOHYDRATE AND SODIUM CHLORIDE 5; .45 G/100ML; G/100ML
INJECTION, SOLUTION INTRAVENOUS CONTINUOUS
Status: DISCONTINUED | OUTPATIENT
Start: 2024-09-18 | End: 2024-09-18 | Stop reason: HOSPADM

## 2024-09-18 RX ORDER — MEPERIDINE HYDROCHLORIDE 25 MG/ML
12.5 INJECTION INTRAMUSCULAR; INTRAVENOUS; SUBCUTANEOUS EVERY 5 MIN PRN
Status: DISCONTINUED | OUTPATIENT
Start: 2024-09-18 | End: 2024-09-18 | Stop reason: HOSPADM

## 2024-09-18 RX ADMIN — FENTANYL CITRATE 100 MCG: 50 INJECTION INTRAMUSCULAR; INTRAVENOUS at 07:34

## 2024-09-18 RX ADMIN — PHENYLEPHRINE HYDROCHLORIDE 200 MCG: 10 INJECTION INTRAVENOUS at 09:13

## 2024-09-18 RX ADMIN — ROPIVACAINE HYDROCHLORIDE 30 ML: 5 INJECTION, SOLUTION EPIDURAL; INFILTRATION; PERINEURAL at 07:31

## 2024-09-18 RX ADMIN — PROPOFOL INJECTABLE EMULSION 50 MCG/KG/MIN: 10 INJECTION, EMULSION INTRAVENOUS at 08:42

## 2024-09-18 RX ADMIN — WATER 2000 MG: 1 INJECTION INTRAMUSCULAR; INTRAVENOUS; SUBCUTANEOUS at 11:30

## 2024-09-18 RX ADMIN — FENTANYL CITRATE 100 MCG: 50 INJECTION, SOLUTION INTRAMUSCULAR; INTRAVENOUS at 07:34

## 2024-09-18 RX ADMIN — FENTANYL CITRATE 25 MCG: 50 INJECTION, SOLUTION INTRAMUSCULAR; INTRAVENOUS at 08:27

## 2024-09-18 RX ADMIN — CELECOXIB 100 MG: 100 CAPSULE ORAL at 06:43

## 2024-09-18 RX ADMIN — MIDAZOLAM HYDROCHLORIDE 2 MG: 1 INJECTION INTRAMUSCULAR; INTRAVENOUS at 07:33

## 2024-09-18 RX ADMIN — ACETAMINOPHEN 1000 MG: 500 TABLET ORAL at 06:43

## 2024-09-18 RX ADMIN — SODIUM CHLORIDE, POTASSIUM CHLORIDE, SODIUM LACTATE AND CALCIUM CHLORIDE: 600; 310; 30; 20 INJECTION, SOLUTION INTRAVENOUS at 09:13

## 2024-09-18 RX ADMIN — FENTANYL CITRATE 25 MCG: 50 INJECTION, SOLUTION INTRAMUSCULAR; INTRAVENOUS at 08:40

## 2024-09-18 RX ADMIN — CEFAZOLIN 2000 MG: 2 INJECTION, POWDER, FOR SOLUTION INTRAMUSCULAR; INTRAVENOUS at 08:41

## 2024-09-18 RX ADMIN — OXYCODONE HYDROCHLORIDE 10 MG: 5 TABLET ORAL at 14:21

## 2024-09-18 RX ADMIN — SODIUM CHLORIDE, POTASSIUM CHLORIDE, SODIUM LACTATE AND CALCIUM CHLORIDE: 600; 310; 30; 20 INJECTION, SOLUTION INTRAVENOUS at 06:42

## 2024-09-18 RX ADMIN — BUPIVACAINE HYDROCHLORIDE IN DEXTROSE 13 MG: 7.5 INJECTION, SOLUTION SUBARACHNOID at 08:40

## 2024-09-18 RX ADMIN — DEXAMETHASONE SODIUM PHOSPHATE 8 MG: 10 INJECTION INTRAMUSCULAR; INTRAVENOUS at 06:43

## 2024-09-18 RX ADMIN — FENTANYL CITRATE 25 MCG: 50 INJECTION, SOLUTION INTRAMUSCULAR; INTRAVENOUS at 08:34

## 2024-09-18 RX ADMIN — PHENYLEPHRINE HYDROCHLORIDE 100 MCG: 10 INJECTION INTRAVENOUS at 09:40

## 2024-09-18 RX ADMIN — MIDAZOLAM 2 MG: 1 INJECTION INTRAMUSCULAR; INTRAVENOUS at 07:33

## 2024-09-18 RX ADMIN — MELOXICAM 3.75 MG: 7.5 TABLET ORAL at 12:34

## 2024-09-18 RX ADMIN — PHENYLEPHRINE HYDROCHLORIDE 100 MCG: 10 INJECTION INTRAVENOUS at 09:25

## 2024-09-18 RX ADMIN — SODIUM CHLORIDE, POTASSIUM CHLORIDE, SODIUM LACTATE AND CALCIUM CHLORIDE: 600; 310; 30; 20 INJECTION, SOLUTION INTRAVENOUS at 09:58

## 2024-09-18 ASSESSMENT — PAIN SCALES - GENERAL
PAINLEVEL_OUTOF10: 5
PAINLEVEL_OUTOF10: 4

## 2024-09-18 ASSESSMENT — PAIN DESCRIPTION - LOCATION
LOCATION: KNEE
LOCATION: KNEE

## 2024-09-18 ASSESSMENT — PAIN - FUNCTIONAL ASSESSMENT
PAIN_FUNCTIONAL_ASSESSMENT: 0-10
PAIN_FUNCTIONAL_ASSESSMENT: 0-10

## 2024-09-18 ASSESSMENT — PAIN DESCRIPTION - DESCRIPTORS
DESCRIPTORS: DISCOMFORT
DESCRIPTORS: ACHING;DISCOMFORT

## 2024-09-18 ASSESSMENT — PAIN DESCRIPTION - ORIENTATION
ORIENTATION: LEFT
ORIENTATION: LEFT

## 2024-09-18 ASSESSMENT — LIFESTYLE VARIABLES: SMOKING_STATUS: 0

## 2024-09-23 LAB — SURGICAL PATHOLOGY REPORT: NORMAL

## 2024-09-25 PROBLEM — Z01.818 PRE-OP EXAM: Status: RESOLVED | Noted: 2024-08-26 | Resolved: 2024-09-25

## 2024-09-25 LAB
INR PPP: 1.7
PROTHROMBIN TIME: 18.9 SEC (ref 9.3–12.4)

## 2024-10-02 DIAGNOSIS — M17.12 PRIMARY OSTEOARTHRITIS OF LEFT KNEE: Primary | ICD-10-CM

## 2024-10-03 ENCOUNTER — OFFICE VISIT (OUTPATIENT)
Dept: ORTHOPEDIC SURGERY | Age: 69
End: 2024-10-03

## 2024-10-03 VITALS — TEMPERATURE: 98 F | HEIGHT: 67 IN | BODY MASS INDEX: 38.92 KG/M2 | WEIGHT: 248 LBS

## 2024-10-03 DIAGNOSIS — Z96.652 S/P TOTAL KNEE ARTHROPLASTY, LEFT: Primary | ICD-10-CM

## 2024-10-03 PROCEDURE — 99024 POSTOP FOLLOW-UP VISIT: CPT | Performed by: ORTHOPAEDIC SURGERY

## 2024-10-03 RX ORDER — OXYCODONE HYDROCHLORIDE 5 MG/1
5 TABLET ORAL EVERY 6 HOURS PRN
Qty: 28 TABLET | Refills: 0 | Status: SHIPPED | OUTPATIENT
Start: 2024-10-03 | End: 2024-10-10

## 2024-10-03 NOTE — PROGRESS NOTES
Subjective:     Post-Operative week: 2 Status Post left Total Knee Arthroplasty, surgery date 9/18/24. Systemic or Specific Complaints:none. She is ambulating with walker. She is taking Oxycodone as needed for pain.    Objective:     General: alert, appears stated age and cooperative   Wound: Wound clean and dry no evidence of infection., No Erythema, No Edema and No Drainage   Motion: Flexion: 0 to 90 Degrees   DVT Exam: No evidence of DVT seen on physical exam.  Negative Radha's sign.  No cords or calf tenderness.  No significant calf/ankle edema.     Imaging:  Xrays:  No signs of fracture, there is good alignment, and no signs of aseptic loosening.   Radiographic findings reviewed with patient    Assessment:     Encounter Diagnosis   Name Primary?    S/P total knee arthroplasty, left Yes      Doing well postoperatively.     Plan:     Continues current post-op course, staples were removed at today's appointment  Patient is to continue taking enteric coated aspirin 81 mg, 1 tablet twice daily.    Patient is to continue wearing BETTY hose, on during the day and off at night.    Outpatient physical therapy is to begin immediately.    No bathing/swimming/hot tub for two weeks or until incision is completely closed.    We will see the patient back in 4 weeks for repeat xray and evaluation.  Please call office with any questions or concerns at [phone]   Oxycodone 5mg

## 2024-10-08 ENCOUNTER — TELEPHONE (OUTPATIENT)
Dept: FAMILY MEDICINE CLINIC | Age: 69
End: 2024-10-08

## 2024-10-08 DIAGNOSIS — J40 BRONCHITIS: Primary | ICD-10-CM

## 2024-10-08 RX ORDER — ALBUTEROL SULFATE 90 UG/1
2 INHALANT RESPIRATORY (INHALATION) EVERY 6 HOURS PRN
Qty: 18 G | Refills: 3 | Status: SHIPPED | OUTPATIENT
Start: 2024-10-08

## 2024-10-08 RX ORDER — AMOXICILLIN 250 MG/1
250 CAPSULE ORAL 3 TIMES DAILY
Qty: 30 CAPSULE | Refills: 0 | Status: SHIPPED | OUTPATIENT
Start: 2024-10-08 | End: 2024-10-18

## 2024-10-08 RX ORDER — BENZONATATE 100 MG/1
100-200 CAPSULE ORAL 3 TIMES DAILY PRN
Qty: 60 CAPSULE | Refills: 0 | Status: SHIPPED | OUTPATIENT
Start: 2024-10-08 | End: 2024-10-18

## 2024-10-08 NOTE — TELEPHONE ENCOUNTER
Patient called and states he been very horse, clear congestions, wheezing. Not covid tested would like medication sent in

## 2024-10-10 ENCOUNTER — TELEPHONE (OUTPATIENT)
Dept: FAMILY MEDICINE CLINIC | Age: 69
End: 2024-10-10

## 2024-10-10 ENCOUNTER — NURSE ONLY (OUTPATIENT)
Dept: FAMILY MEDICINE CLINIC | Age: 69
End: 2024-10-10

## 2024-10-10 DIAGNOSIS — I48.0 PAROXYSMAL ATRIAL FIBRILLATION (HCC): Primary | ICD-10-CM

## 2024-10-10 LAB
INTERNATIONAL NORMALIZATION RATIO, POC: 4.3
PROTHROMBIN TIME, POC: NORMAL

## 2024-10-10 NOTE — TELEPHONE ENCOUNTER
Spoke to pt and informed her that she is to hold her Coumadin tomorrow morning and then take 2mg starting Sat. Pt is to return on Monday for a recheck. Pt states she is not sure if she can get back in (Monday) due to transportation issues. Instructed to call us back asap to let us know if and when she can get back in for the recheck. She voiced understanding .     Electronically signed by Stephie Palaicos LPN on 10/10/24 at 3:08 PM EDT

## 2024-10-10 NOTE — PROGRESS NOTES
10/10/24: INR: 4.3 Pt taking 3.5 mg every day. Pt is on Amoxicillin 250mg three times a day currently.     Electronically signed by Stephie Palacios LPN on 10/10/24 at 1:56 PM EDT

## 2024-10-14 ENCOUNTER — NURSE ONLY (OUTPATIENT)
Dept: FAMILY MEDICINE CLINIC | Age: 69
End: 2024-10-14
Payer: MEDICARE

## 2024-10-14 DIAGNOSIS — I48.91 ATRIAL FIBRILLATION, UNSPECIFIED TYPE (HCC): ICD-10-CM

## 2024-10-14 DIAGNOSIS — I48.20 CHRONIC ATRIAL FIBRILLATION (HCC): ICD-10-CM

## 2024-10-14 DIAGNOSIS — I48.0 PAROXYSMAL ATRIAL FIBRILLATION (HCC): Primary | ICD-10-CM

## 2024-10-14 LAB
INTERNATIONAL NORMALIZATION RATIO, POC: 3.1
PROTHROMBIN TIME, POC: NORMAL

## 2024-10-14 PROCEDURE — 85610 PROTHROMBIN TIME: CPT | Performed by: FAMILY MEDICINE

## 2024-10-14 PROCEDURE — 93793 ANTICOAG MGMT PT WARFARIN: CPT | Performed by: FAMILY MEDICINE

## 2024-10-14 RX ORDER — WARFARIN SODIUM 1 MG/1
1 TABLET ORAL DAILY
Qty: 90 TABLET | Refills: 1 | Status: SHIPPED | OUTPATIENT
Start: 2024-10-14

## 2024-10-14 RX ORDER — WARFARIN SODIUM 2.5 MG/1
TABLET ORAL
Qty: 90 TABLET | Refills: 1 | Status: SHIPPED | OUTPATIENT
Start: 2024-10-14

## 2024-10-14 NOTE — TELEPHONE ENCOUNTER
Last Appointment:  8/26/2024  Future Appointments   Date Time Provider Department Center   10/17/2024  2:00 PM [facility]   10/18/2024  2:40 PM SCHEDULE, [facility]   10/31/2024  9:40 AM Farhat Mcdonnell DO [facility]   11/4/2024 11:00 AM Bing Mcclain DO [facility] DEP

## 2024-10-17 ENCOUNTER — EVALUATION (OUTPATIENT)
Dept: PHYSICAL THERAPY | Age: 69
End: 2024-10-17
Payer: MEDICARE

## 2024-10-17 DIAGNOSIS — Z96.652 S/P TOTAL KNEE ARTHROPLASTY, LEFT: Primary | ICD-10-CM

## 2024-10-17 PROCEDURE — 97110 THERAPEUTIC EXERCISES: CPT | Performed by: PHYSICAL THERAPIST

## 2024-10-17 PROCEDURE — 97161 PT EVAL LOW COMPLEX 20 MIN: CPT | Performed by: PHYSICAL THERAPIST

## 2024-10-17 NOTE — PROGRESS NOTES
[facility]          Phone: [phone] Fax: [phone]    Physical Therapy Daily Treatment Note  Date:  10/17/2024    Patient Name:  Katie Garcia    :  1955  MRN: [id number]    Evaluating therapist: Keyona Brasehr, PT, DPT  [id number]    Restrictions/Precautions:      Diagnosis:     Diagnosis Orders   1. S/P total knee arthroplasty, left          Treatment Diagnosis:    Insurance/Certification information:  Medicare/ [facility]  Referring Physician:  Farhat Mcdonnell DO  Plan of care signed (Y/N):    Visit# / total visits:    Pain level: 1/10   Time In:  1400  Time Out:  1435    Subjective:  See initial evaluation    Exercises:  Exercise/Equipment Resistance/Repetitions Other comments            Quad Sets 10x 5 sec hold Rolled towel under ankle     Heel slides 10x 5 sec hold      SLR 10x       LAQ 10x      Seated HS stretch 2x 30 sec hold                                                                                                    Other:  Pt tolerated introduction of TE's with good understanding following cueing. Pt required ongoing cues to avoid externally rotating and flexing L LE. Pt verbalized understanding of HEP and POC.    Home Exercise Program:  quad sets with towel under ankle, heel slides, SLR, LAQ, seated HS stretch    Manual Treatments: --    Modalities:  --     Time-in Time-out Total Time   35084  Evaluation Low Complexity 1400 1420 20   42757  Evaluation Med Complexity      99628  Evaluation High Complexity      06227  Ther Ex 1420 1435 15   66299  Neuro Re-ed        50254  Ther Activities        30374  Manual Therapy       36762  E-stim       25102  Ultrasound            Session 1400 1435 35       Treatment/Activity Tolerance:  [x] Patient tolerated treatment well [] Patient limited by fatigue  [] Patient limited by pain  [] Patient limited by other medical complications  [] Other:     Prognosis: [x] Good [] Fair  [] Poor    Patient 
status post left knee arthroplasty in excellent anatomic alignment expected postsurgical changes the adjacent soft tissues.     Status post left knee arthroplasty in excellent anatomic alignment.       Past Medical History:  Past Medical History:   Diagnosis Date    Atrial fibrillation (HCC)     Chronic venous insufficiency 2023    Coronary heart disease 2013    Endometrial hyperplasia     Leg pain     Leg swelling 2023    Mixed hyperlipidemia 2022    Pelvic mass     Primary osteoarthritis of right shoulder 2019    Sciatica of right side 2022    Varicose veins of bilateral lower extremities with other complications 2023    Varicose veins of both legs with edema      Past Surgical History:   Procedure Laterality Date     SECTION      DILATION AND CURETTAGE OF UTERUS      ECHO COMPL W DOP COLOR FLOW  2013         HYSTERECTOMY (CERVIX STATUS UNKNOWN)  10/12/2021    TONSILLECTOMY      TOTAL KNEE ARTHROPLASTY Left 2024    LEFT KNEE TOTAL KNEE ARTHROPLASTY -24 SMITH AND NEPHJASPAL performed by Farhat Mcdonnell DO at [facility] OR       Medications:   Current Outpatient Medications   Medication Sig Dispense Refill    warfarin (COUMADIN) 1 MG tablet Take 1 tablet by mouth daily 90 tablet 1    warfarin (COUMADIN) 2.5 MG tablet TAKE 1 TABLET BY MOUTH DAILY OR AS DIRECTED 90 tablet 1    benzonatate (TESSALON) 100 MG capsule Take 1-2 capsules by mouth 3 times daily as needed for Cough 60 capsule 0    albuterol sulfate HFA (PROVENTIL;VENTOLIN;PROAIR) 108 (90 Base) MCG/ACT inhaler Inhale 2 puffs into the lungs every 6 hours as needed for Wheezing or Shortness of Breath 18 g 3    amoxicillin (AMOXIL) 250 MG capsule Take 1 capsule by mouth 3 times daily for 10 days 30 capsule 0    pregabalin (LYRICA) 25 MG capsule Take 1 capsule by mouth 2 times daily for 28 days. Max Daily Amount: 50 mg 56 capsule 0    fluticasone (FLONASE) 50 MCG/ACT nasal spray 1 spray by Each Nostril route

## 2024-10-18 ENCOUNTER — NURSE ONLY (OUTPATIENT)
Dept: FAMILY MEDICINE CLINIC | Age: 69
End: 2024-10-18

## 2024-10-18 DIAGNOSIS — Z79.01 LONG TERM (CURRENT) USE OF ANTICOAGULANTS: ICD-10-CM

## 2024-10-18 DIAGNOSIS — I48.91 ATRIAL FIBRILLATION, UNSPECIFIED TYPE (HCC): Primary | ICD-10-CM

## 2024-10-18 LAB
INTERNATIONAL NORMALIZATION RATIO, POC: 2.1
PROTHROMBIN TIME, POC: NORMAL

## 2024-10-18 NOTE — PROGRESS NOTES
10/18/2024: INR: 2.1 Pt taking 2 mg every day. Pt still on her antibiotic. Last dose will be tomorrow (10/19/2024)    Electronically signed by Stephie Palacios LPN on 10/18/24 at 2:51 PM EDT

## 2024-10-22 ENCOUNTER — TREATMENT (OUTPATIENT)
Dept: PHYSICAL THERAPY | Age: 69
End: 2024-10-22
Payer: MEDICARE

## 2024-10-22 DIAGNOSIS — Z96.652 S/P TOTAL KNEE ARTHROPLASTY, LEFT: Primary | ICD-10-CM

## 2024-10-22 PROCEDURE — 97110 THERAPEUTIC EXERCISES: CPT

## 2024-10-22 NOTE — PROGRESS NOTES
Patient tolerated treatment well [] Patient limited by fatigue  [] Patient limited by pain  [] Patient limited by other medical complications  [] Other:     Prognosis: [x] Good [] Fair  [] Poor    Patient Requires Follow-up: [x] Yes  [] No    Plan:   [x] Continue per plan of care [] Alter current plan (see comments)  [] Plan of care initiated [] Hold pending MD visit [] Discharge    Plan for Next Session:  continue to progress L LE strength and ROM      Electronically signed by: Celina Bingham PTA [id number]

## 2024-10-24 ENCOUNTER — TREATMENT (OUTPATIENT)
Dept: PHYSICAL THERAPY | Age: 69
End: 2024-10-24
Payer: MEDICARE

## 2024-10-24 DIAGNOSIS — Z96.652 S/P TOTAL KNEE ARTHROPLASTY, LEFT: Primary | ICD-10-CM

## 2024-10-24 PROCEDURE — 97110 THERAPEUTIC EXERCISES: CPT

## 2024-10-24 NOTE — PROGRESS NOTES
[facility]          Phone: [phone] Fax: [phone]    Physical Therapy Daily Treatment Note  Date:  10/24/2024    Patient Name:  Katie Garcia    :  1955  MRN: [id number]    Evaluating therapist: Keyona Brasher, PT, DPT  [id number]    Restrictions/Precautions:      Diagnosis:     Diagnosis Orders   1. S/P total knee arthroplasty, left          Treatment Diagnosis:    Insurance/Certification information:  Medicare/ [facility]  Referring Physician:  Farhat Mcdonnell DO  Plan of care signed (Y/N):    Visit# / total visits:  3/8  Pain level: 0/10 \" just stiffness\"  Time In:  1353  Time Out:      Subjective:  Pt had no new reports    Pt can not lie on clinic mats too hard and increase her LBP    Exercises:  Exercise/Equipment Resistance/Repetitions Other comments     Bike  X 10 mins      Quad Sets 10x 5 sec hold seated     Heel slides w/ towel 10x 5 sec hold seated     SLR   10x  edge of chair    VMO SLR X 10  edge of chair     LAQ 10x      Seated HS stretch 2x 30 sec hold      Standing hip abd  X 10    B       Step ups 6\" fwd  X 10 leading L LE      Step ups  X 10 reps each fwd, SW       Heel raises X 10 reps                                                                       Other:  Pt tolerated TE's with good understanding following cueing.     Home Exercise Program:  quad sets with towel under ankle, heel slides, SLR, LAQ, seated HS stretch , 10/22/24 VMO SLR    Manual Treatments: --    Modalities:  --     Time-in Time-out Total Time   50158  Evaluation Low Complexity      18082  Evaluation Med Complexity      78495  Evaluation High Complexity      08626  Ther Ex 1353 1423 30   18154  Neuro Re-ed        57294  Ther Activities        90469  Manual Therapy       89161  E-stim       63879  Ultrasound            Session 1353 1423 30       Treatment/Activity Tolerance:  [x] Patient tolerated treatment well [] Patient limited by fatigue  [] Patient limited

## 2024-10-27 SDOH — HEALTH STABILITY: PHYSICAL HEALTH: ON AVERAGE, HOW MANY MINUTES DO YOU ENGAGE IN EXERCISE AT THIS LEVEL?: 0 MIN

## 2024-10-27 SDOH — HEALTH STABILITY: PHYSICAL HEALTH: ON AVERAGE, HOW MANY DAYS PER WEEK DO YOU ENGAGE IN MODERATE TO STRENUOUS EXERCISE (LIKE A BRISK WALK)?: 0 DAYS

## 2024-10-27 ASSESSMENT — PATIENT HEALTH QUESTIONNAIRE - PHQ9
SUM OF ALL RESPONSES TO PHQ QUESTIONS 1-9: 0
SUM OF ALL RESPONSES TO PHQ QUESTIONS 1-9: 0
2. FEELING DOWN, DEPRESSED OR HOPELESS: NOT AT ALL
SUM OF ALL RESPONSES TO PHQ QUESTIONS 1-9: 0
1. LITTLE INTEREST OR PLEASURE IN DOING THINGS: NOT AT ALL
SUM OF ALL RESPONSES TO PHQ QUESTIONS 1-9: 0
SUM OF ALL RESPONSES TO PHQ9 QUESTIONS 1 & 2: 0

## 2024-10-27 ASSESSMENT — LIFESTYLE VARIABLES
HOW OFTEN DO YOU HAVE A DRINK CONTAINING ALCOHOL: MONTHLY OR LESS
HOW OFTEN DO YOU HAVE A DRINK CONTAINING ALCOHOL: 2
HOW MANY STANDARD DRINKS CONTAINING ALCOHOL DO YOU HAVE ON A TYPICAL DAY: 1 OR 2
HOW OFTEN DO YOU HAVE SIX OR MORE DRINKS ON ONE OCCASION: 1
HOW MANY STANDARD DRINKS CONTAINING ALCOHOL DO YOU HAVE ON A TYPICAL DAY: 1

## 2024-10-30 ENCOUNTER — TREATMENT (OUTPATIENT)
Dept: PHYSICAL THERAPY | Age: 69
End: 2024-10-30
Payer: MEDICARE

## 2024-10-30 DIAGNOSIS — Z96.652 S/P TOTAL KNEE ARTHROPLASTY, LEFT: Primary | ICD-10-CM

## 2024-10-30 PROCEDURE — 97110 THERAPEUTIC EXERCISES: CPT | Performed by: PHYSICAL THERAPIST

## 2024-10-30 NOTE — PROGRESS NOTES
[facility]          Phone: [phone] Fax: [phone]    Physical Therapy Daily Treatment Note  Date:  10/30/2024    Patient Name:  Katie Garcia    :  1955  MRN: [id number]    Evaluating therapist: Keyona Brasher PT, DPT  [id number]    Restrictions/Precautions:      Diagnosis:     Diagnosis Orders   1. S/P total knee arthroplasty, left            Treatment Diagnosis:    Insurance/Certification information:  Medicare/ [facility]  Referring Physician:  Farhat Mcdonnell DO  Plan of care signed (Y/N):    Visit# / total visits:    Pain level: 1-2/10 mild soreness/stiffness  Time In:  915  Time Out:  955    Subjective:  Pt had no new reports, notes still having difficulty sleeping due to medial knee pain.    Pt can not lie on clinic mats too hard and increase her LBP    Exercises:  Exercise/Equipment Resistance/Repetitions Other comments     Bike  X 10 mins      Quad Sets 10x 5 sec hold seated     Heel slides w/ towel 10x 5 sec hold seated     SLR   10x  edge of chair    VMO SLR X 10  edge of chair     LAQ 10x 2# at ankle     Seated HS stretch 2x 30 sec hold      Standing hip abd  X 10    B       Standing fwd kicks X 10    B      Step ups 6\" fwd  X 20 leading L LE      Step ups 4\" lateral X 10 leading L LE      Heel raises X 10 reps               Gait training with SPC 2x laps      Seated knee flexion stretch 1 min                                  L knee AROM: 4-112°              Other:  Pt tolerated TE's with good understanding following cueing. Pt required intermittent cues to increase quad engagement with modified SLR.    Home Exercise Program:  quad sets with towel under ankle, heel slides, SLR, LAQ, seated HS stretch , 10/22/24 VMO SLR    Manual Treatments: --    Modalities:  --     Time-in Time-out Total Time   01894  Evaluation Low Complexity      83872  Evaluation Med Complexity      94571  Evaluation High Complexity      40290  Ther Ex 0950 1930 40

## 2024-10-31 ENCOUNTER — OFFICE VISIT (OUTPATIENT)
Dept: ORTHOPEDIC SURGERY | Age: 69
End: 2024-10-31

## 2024-10-31 VITALS — HEIGHT: 67 IN | BODY MASS INDEX: 36.57 KG/M2 | WEIGHT: 233 LBS

## 2024-10-31 DIAGNOSIS — Z96.652 S/P TOTAL KNEE ARTHROPLASTY, LEFT: Primary | ICD-10-CM

## 2024-10-31 PROCEDURE — 99024 POSTOP FOLLOW-UP VISIT: CPT | Performed by: ORTHOPAEDIC SURGERY

## 2024-10-31 RX ORDER — TRAMADOL HYDROCHLORIDE 50 MG/1
50 TABLET ORAL EVERY 6 HOURS PRN
Qty: 28 TABLET | Refills: 0 | Status: SHIPPED | OUTPATIENT
Start: 2024-10-31 | End: 2024-11-07

## 2024-10-31 NOTE — PROGRESS NOTES
Post-Operative week: 6 Status Post left Total Knee Arthroplasty, surgery date 9/12/24  Systemic or Specific Complaints:No Complaints    Objective:     General: alert, appears stated age, and cooperative   Wound: Wound clean and dry no evidence of infection., No Erythema, No Edema, No Drainage, and Wound Intact   Motion: Flexion: 115 to 0 Degrees   DVT Exam: No evidence of DVT seen on physical exam.  Negative Radha's sign.  No cords or calf tenderness.  No significant calf/ankle edema.       Xrays:  No signs of fracture, there is good alignment, and no signs of aseptic loosening.   Radiographic findings reviewed with patient    Assessment:     Encounter Diagnosis   Name Primary?    S/P total knee arthroplasty, left Yes      Doing well postoperatively.     Plan:     Continues current post-op course  Continues current post-op course, staples were removed at today's appt  Patient is to continue taking enteric coated aspirin 81 mg, 1 tablet twice daily.  Patient is to continue wearing BETTY hose until next follow up visit but wear during the day and remove at night.  Outpatient physical therapy is to begin immediately.  No bathing/swimming/hot tub for two weeks or until incision is completely closed.  We will see the patient back in 8 weeks for repeat xray and evaluation.     Tramadol #28 q6 prn  Tentative RTW 11/30/24

## 2024-11-01 ENCOUNTER — TREATMENT (OUTPATIENT)
Dept: PHYSICAL THERAPY | Age: 69
End: 2024-11-01

## 2024-11-01 DIAGNOSIS — Z96.652 S/P TOTAL KNEE ARTHROPLASTY, LEFT: Primary | ICD-10-CM

## 2024-11-01 NOTE — PROGRESS NOTES
[facility]          Phone: [phone] Fax: [phone]    Physical Therapy Daily Treatment Note  Date:  2024    Patient Name:  Katie Garcia    :  1955  MRN: [id number]    Evaluating therapist: Keyona Brasher, PT, DPT  [id number]    Restrictions/Precautions:      Diagnosis:     Diagnosis Orders   1. S/P total knee arthroplasty, left              Treatment Diagnosis:    Insurance/Certification information:  Medicare/ [facility]  Referring Physician:  Farhat Mcdonnell DO  Plan of care signed (Y/N):  yes  Visit# / total visits:    Pain level: 1-2/10 mild soreness/stiffness  Time In:  1300  Time Out:  1330    Subjective:  Pt had follow up with ortho yesterday, notes Dr. Mcdonnell was pleased with her progress and assured her all symptoms she was experiencing were normal.    Pt can not lie on clinic mats too hard and increase her LBP    Exercises:  Exercise/Equipment Resistance/Repetitions Other comments     Bike  X 10 mins      Quad Sets 10x 5 sec hold seated     Heel slides w/ towel 10x 5 sec hold seated     SLR   edge of chair    VMO SLR edge of chair     LAQ 10x2 2# at ankle     Seated HS stretch 2x 30 sec hold      Standing hip abd  X 10    B       Standing fwd kicks X 10    B      Standing ext kicks X 10    B      Step ups 6\" fwd  X 20 leading L LE      Step ups 4\" lateral X 10 leading L LE      Heel raises X 10 reps        Seated HS curl X 15 reps RTB-- progress to GTB          Gait training with SPC 2x laps      Seated knee flexion stretch 1 min             Sit<>stand 10x  from standard chair                   L knee AROM: 4-112°              Other:  Pt tolerated TE's with good understanding following cueing. Pt tolerated progression of strengthening exercises with only muscular fatigue. Decreased compensatory movements observed with further repetitions of sit<>stand.    Home Exercise Program:  quad sets with towel under ankle, heel slides, SLR, LAQ,

## 2024-11-04 ENCOUNTER — OFFICE VISIT (OUTPATIENT)
Dept: FAMILY MEDICINE CLINIC | Age: 69
End: 2024-11-04

## 2024-11-04 VITALS
SYSTOLIC BLOOD PRESSURE: 124 MMHG | RESPIRATION RATE: 18 BRPM | DIASTOLIC BLOOD PRESSURE: 80 MMHG | WEIGHT: 235.2 LBS | OXYGEN SATURATION: 97 % | HEIGHT: 67 IN | HEART RATE: 93 BPM | BODY MASS INDEX: 36.91 KG/M2 | TEMPERATURE: 97.5 F

## 2024-11-04 DIAGNOSIS — I48.91 ATRIAL FIBRILLATION, UNSPECIFIED TYPE (HCC): ICD-10-CM

## 2024-11-04 DIAGNOSIS — D68.69 SECONDARY HYPERCOAGULABLE STATE (HCC): ICD-10-CM

## 2024-11-04 DIAGNOSIS — Z00.00 MEDICARE ANNUAL WELLNESS VISIT, SUBSEQUENT: Primary | ICD-10-CM

## 2024-11-04 DIAGNOSIS — I82.462 ACUTE DEEP VEIN THROMBOSIS (DVT) OF CALF MUSCLE VEIN OF LEFT LOWER EXTREMITY (HCC): ICD-10-CM

## 2024-11-04 DIAGNOSIS — Z79.01 LONG TERM (CURRENT) USE OF ANTICOAGULANTS: ICD-10-CM

## 2024-11-04 LAB
INTERNATIONAL NORMALIZATION RATIO, POC: 1.7
PROTHROMBIN TIME, POC: NORMAL

## 2024-11-05 ENCOUNTER — TREATMENT (OUTPATIENT)
Dept: PHYSICAL THERAPY | Age: 69
End: 2024-11-05
Payer: MEDICARE

## 2024-11-05 DIAGNOSIS — Z96.652 S/P TOTAL KNEE ARTHROPLASTY, LEFT: Primary | ICD-10-CM

## 2024-11-05 PROCEDURE — 97110 THERAPEUTIC EXERCISES: CPT | Performed by: PHYSICAL THERAPIST

## 2024-11-05 NOTE — PROGRESS NOTES
[facility]          Phone: [phone] Fax: [phone]    Physical Therapy Daily Treatment Note  Date:  2024    Patient Name:  Katie Garcia    :  1955  MRN: [id number]    Evaluating therapist: Keyona Brasher PT, DPT  [id number]    Restrictions/Precautions:      Diagnosis:     Diagnosis Orders   1. S/P total knee arthroplasty, left          Treatment Diagnosis:    Insurance/Certification information:  Medicare/ [facility]  Referring Physician:  Farhat Mcdonnell DO  Plan of care signed (Y/N):  yes  Visit# / total visits:    Pain level: 1-2/10 mild soreness/stiffness  Time In:  7  Time Out:      Subjective:  Pt had follow up with ortho yesterday, notes Dr. Mcdonnell was pleased with her progress and assured her all symptoms she was experiencing were normal.    Pt can not lie on clinic mats too hard and increase her LBP    Exercises:  Exercise/Equipment Resistance/Repetitions Other comments     Bike  X 8 mins      Quad Sets 10x 5 sec hold seated     Heel slides w/ towel 10x 5 sec hold seated     SLR   edge of chair     VMO SLR edge of chair     LAQ 10x2 2# at ankle     Seated HS stretch 2x 30 sec hold      Standing hip abd  X 10    B       Standing fwd kicks X 10    B      Standing ext kicks X 10    B      Step ups 6\" fwd  X 10 leading L LE      Step ups 4\" lateral X 10 leading L LE      Heel raises X 10 reps        Seated HS curl X 15 reps GTB          Gait training with SPC 2x laps      Seated knee flexion stretch 1 min             Sit<>stand 10x  from standard chair            Stair training 2x laps on small clinic steps  13 steps in lobby Single HR and SPC- reciprocal pattern          L knee AROM: 4-112°              Other:  Pt was able to initiate sit<>stand with single UE support on mat table only- was unable to initiate without UE support. Pt demonstrates improved overall strength and agility with functional mobility this session.    Home

## 2024-11-06 PROBLEM — Z00.00 MEDICARE ANNUAL WELLNESS VISIT, SUBSEQUENT: Status: ACTIVE | Noted: 2024-11-06

## 2024-11-06 NOTE — PATIENT INSTRUCTIONS
information.      Personalized Preventive Plan for Katie Garcia - 11/4/2024  Medicare offers a range of preventive health benefits. Some of the tests and screenings are paid in full while other may be subject to a deductible, co-insurance, and/or copay.    Some of these benefits include a comprehensive review of your medical history including lifestyle, illnesses that may run in your family, and various assessments and screenings as appropriate.    After reviewing your medical record and screening and assessments performed today your provider may have ordered immunizations, labs, imaging, and/or referrals for you.  A list of these orders (if applicable) as well as your Preventive Care list are included within your After Visit Summary for your review.    Other Preventive Recommendations:    A preventive eye exam performed by an eye specialist is recommended every 1-2 years to screen for glaucoma; cataracts, macular degeneration, and other eye disorders.  A preventive dental visit is recommended every 6 months.  Try to get at least 150 minutes of exercise per week or 10,000 steps per day on a pedometer .  Order or download the FREE \"Exercise & Physical Activity: Your Everyday Guide\" from [employer]. Call 1-484.220.2036 or search [employer] online.  You need 5248-4069 mg of calcium and 8751-1691 IU of vitamin D per day. It is possible to meet your calcium requirement with diet alone, but a vitamin D supplement is usually necessary to meet this goal.  When exposed to the sun, use a sunscreen that protects against both UVA and UVB radiation with an SPF of 30 or greater. Reapply every 2 to 3 hours or after sweating, drying off with a towel, or swimming.  Always wear a seat belt when traveling in a car. Always wear a helmet when riding a bicycle or motorcycle.

## 2024-11-06 NOTE — PROGRESS NOTES
Medicare Annual Wellness Visit    Katie Garcia is here for Medicare AWV (Pt here for an AWV.) and Office Visit for Anticoagulation Management (Pt here for her Protime to be checked at her AWV.  Pt taking Coumadin 2.5mg QD.)  This 69 year old female presents for physical exam. Pt has hypertension, hyperlipidemia, atrial fib., DVT (HCC).   Assessment & Plan   Medicare annual wellness visit, subsequent  Atrial fibrillation, unspecified type (HCC)  -     POCT INR  Acute deep vein thrombosis (DVT) of calf muscle vein of left lower extremity (HCC)  Secondary hypercoagulable state (HCC)  Long term (current) use of anticoagulants    Patient is stable  Medications are not changed at this visit  Continue current care    Pt instructed if any worse go ED ASAP.    Recommendations for Preventive Services Due: see orders and patient instructions/AVS.  Recommended screening schedule for the next 5-10 years is provided to the patient in written form: see Patient Instructions/AVS.     Return in about 3 months (around 2/4/2025).     Subjective   The following acute and/or chronic problems were also addressed today:  Physical exam     Patient's complete Health Risk Assessment and screening values have been reviewed and are found in Flowsheets. The following problems were reviewed today and where indicated follow up appointments were made and/or referrals ordered.    Positive Risk Factor Screenings with Interventions:    Fall Risk:  Do you feel unsteady or are you worried about falling? : (!) yes  2 or more falls in past year?: (!) yes  Fall with injury in past year?: no     Interventions:    Instructions          Controlled Medication Review:    Today's Pain Level: No data recorded   Opioid Risk: (Low risk score <55) Opioid risk score: 17    Patient is low risk for opioid use disorder or overdose.    Last PDMP [place] as Reviewed:  Review User Review Instant Review Result   ASHELIGH HENRIQUEZ 10/3/2024 10:08 AM     Reviewed PDMP [1]

## 2024-11-12 ENCOUNTER — TREATMENT (OUTPATIENT)
Dept: PHYSICAL THERAPY | Age: 69
End: 2024-11-12
Payer: MEDICARE

## 2024-11-12 DIAGNOSIS — Z96.652 S/P TOTAL KNEE ARTHROPLASTY, LEFT: Primary | ICD-10-CM

## 2024-11-12 PROCEDURE — 97110 THERAPEUTIC EXERCISES: CPT
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
## 2024-11-14 ENCOUNTER — TREATMENT (OUTPATIENT)
Dept: PHYSICAL THERAPY | Age: 69
End: 2024-11-14

## 2024-11-14 DIAGNOSIS — Z96.652 S/P TOTAL KNEE ARTHROPLASTY, LEFT: Primary | ICD-10-CM

## 2024-11-14 NOTE — PROGRESS NOTES
[facility]          Phone: [phone] Fax: [phone]    Physical Therapy Daily Treatment Note  Date:  2024    Patient Name:  Katie Garcia    :  1955  MRN: [id number]    Evaluating therapist: Keyona Brasher PT, DPT  [id number]    Restrictions/Precautions:      Diagnosis:     Diagnosis Orders   1. S/P total knee arthroplasty, left            Treatment Diagnosis:    Insurance/Certification information:  Medicare/ [facility]  Referring Physician:  Farhat Mcdonnell DO  Plan of care signed (Y/N):  yes  Visit# / total visits:  -  Pain level: 1-2/10 mild soreness/stiffness L knee  Time In:  1445  Time Out:  1525    Subjective:  Pt reports feeling overall fatigue from standing yesterday at a work event as she was at a standing table when a regular height chair was not available.    Pt can not lie on clinic mats too hard and increase her LBP    Exercises:  Exercise/Equipment Resistance/Repetitions Other comments     Bike  X 8 mins      Quad Sets seated     Heel slides w/ towel 10x 5 sec hold seated   edge of chair   edge of chair     LAQ 10x2 2# at ankle     Seated HS stretch 2x 30 sec hold      Standing hip abd  X 10    B       Standing fwd kicks X 10    B      Standing ext kicks X 10    B      Step ups 6\" fwd  X 10 leading L LE      Step ups 6\" lateral X 10 leading L LE      Heel raises X 10 reps        Seated HS curl 2 X 10 reps GTB          Gait training with SPC      Seated knee flexion stretch      Step stretch  3 x 45 sec      Eccentric step down X 10 L LE WB 4\" step with B UE support            Sit<>stand from standard chair            Stair training 13 steps in lobby x 2 laps Single HR and SPC- reciprocal pattern          L knee AROM: 0-115°       L knee MMT: 4/5 flexion and extension       LEFS: 40/80       Other: Pt tolerated progression of TE's this date with LE fatigue. Pt demonstrated appropriate form but required B UE support for control

## 2024-11-21 ENCOUNTER — TREATMENT (OUTPATIENT)
Dept: PHYSICAL THERAPY | Age: 69
End: 2024-11-21
Payer: MEDICARE

## 2024-11-21 DIAGNOSIS — Z96.652 S/P TOTAL KNEE ARTHROPLASTY, LEFT: Primary | ICD-10-CM

## 2024-11-21 PROCEDURE — 97110 THERAPEUTIC EXERCISES: CPT

## 2024-11-21 NOTE — PROGRESS NOTES
[facility]          Phone: [phone] Fax: [phone]    Physical Therapy Daily Treatment Note  Date:  2024    Patient Name:  Katie Garcia    :  1955  MRN: [id number]    Evaluating therapist: Keyona Brasher, PT, DPT  [id number]    Restrictions/Precautions:      Diagnosis:     Diagnosis Orders   1. S/P total knee arthroplasty, left            Treatment Diagnosis:    Insurance/Certification information:  Medicare/ [facility]  Referring Physician:  Farhat Mcdonnell DO  Plan of care signed (Y/N):  yes  Visit# / total visits:  -  Pain level: 1-2/10 mild soreness/stiffness L knee  Time In:  1430  Time Out:  1502    Subjective:  Pt reported she feels she has gained more movement in her L knee , and is happy with her progress    Pt can not lie on clinic mats too hard and increase her LBP    Exercises:  Exercise/Equipment Resistance/Repetitions Other comments     Bike  X 10 mins      Quad Sets seated     Heel slides w/ towel 10x 5 sec hold seated   edge of chair   edge of chair     LAQ 10x2 2# at ankle     Seated HS stretch 2x 30 sec hold      Standing hip abd  X 10    B       Standing fwd kicks X 10    B      Standing ext kicks X 10    B      Step ups 6\" fwd  X 10 leading L LE      Step ups 6\" lateral X 10 leading L LE      Heel raises X 10 reps        Seated HS curl 2 X 10 reps GTB          Gait training with SPC      Seated knee flexion stretch      Step stretch  3 x 45 sec      Eccentric step down X 10 L LE WB 4\" step with B UE support            Sit<>stand from standard chair            Stair training 13 steps in lobby x 2 laps Single HR and SPC- reciprocal pattern          L knee AROM: 0-115°       L knee MMT: 4/5 flexion and extension       LEFS: 40/80       Other: Pt tolerated progression of TE's this date with LE fatigue. Pt remains motivated to progress ROM and strength of L knee.     Pt unable to progress supine TE's in session d/t inability to

## 2024-11-26 ENCOUNTER — TREATMENT (OUTPATIENT)
Dept: PHYSICAL THERAPY | Age: 69
End: 2024-11-26
Payer: MEDICARE

## 2024-11-26 DIAGNOSIS — Z96.652 S/P TOTAL KNEE ARTHROPLASTY, LEFT: Primary | ICD-10-CM

## 2024-11-26 PROCEDURE — 97110 THERAPEUTIC EXERCISES: CPT

## 2024-11-26 NOTE — PROGRESS NOTES
[facility]          Phone: [phone] Fax: [phone]    Physical Therapy Daily Treatment Note  Date:  2024    Patient Name:  Katie Garcia    :  1955  MRN: [id number]    Evaluating therapist: Keyona Brasher PT, DPT  [id number]    Restrictions/Precautions:      Diagnosis:     Diagnosis Orders   1. S/P total knee arthroplasty, left            Treatment Diagnosis:    Insurance/Certification information:  Medicare/ [facility]  Referring Physician:  Farhat Mcdonnell DO  Plan of care signed (Y/N):  yes  Visit# / total visits:  10/8-  Pain level: 1-2/10 mild soreness/stiffness L knee  Time In:  1429  Time Out:  1500    Subjective:  Pt reported she feels she has gained more movement in her L knee , and is happy with her progress. Pt is indep w/ HEP. Pt can ambulate on slightly uneven surfaces without AD x 400 ft without incident.     Pt can not lie on clinic mats too hard and increase her LBP    Exercises:  Exercise/Equipment Resistance/Repetitions Other comments     Bike  X 10 mins      Quad Sets seated     Heel slides w/ towel 10x 5 sec hold seated   edge of chair   edge of chair     LAQ 10x2 2# at ankle     Seated HS stretch 2x 30 sec hold      Standing hip abd  X 10    B       Standing fwd kicks X 10    B      Standing ext kicks X 10    B      Step ups 6\" fwd  X 10 leading L LE      Step ups 6\" lateral X 10 leading L LE      Heel raises X 10 reps        Seated HS curl 2 X 10 reps GTB          Gait training with SPC      Seated knee flexion stretch      Step stretch  3 x 45 sec      Eccentric step down X 10 L LE WB 4\" step with B UE support            Sit<>stand from standard chair            Stair training 13 steps in lobby x 2 laps Single HR and SPC- reciprocal pattern          L knee AROM: 0-115°       L knee MMT: 4+/5 flexion and extension       LEFS: 50/80       Other: Pt tolerated progression of TE's this date with LE fatigue. Pt remains motivated

## 2024-12-06 PROBLEM — Z00.00 MEDICARE ANNUAL WELLNESS VISIT, SUBSEQUENT: Status: RESOLVED | Noted: 2024-11-06 | Resolved: 2024-12-06

## 2024-12-11 ENCOUNTER — TELEPHONE (OUTPATIENT)
Dept: FAMILY MEDICINE CLINIC | Age: 69
End: 2024-12-11

## 2024-12-16 ENCOUNTER — NURSE ONLY (OUTPATIENT)
Dept: FAMILY MEDICINE CLINIC | Age: 69
End: 2024-12-16

## 2024-12-16 DIAGNOSIS — Z79.01 LONG TERM (CURRENT) USE OF ANTICOAGULANTS: ICD-10-CM

## 2024-12-16 DIAGNOSIS — I48.91 ATRIAL FIBRILLATION, UNSPECIFIED TYPE (HCC): Primary | ICD-10-CM

## 2024-12-16 LAB
INTERNATIONAL NORMALIZATION RATIO, POC: 1.5
PROTHROMBIN TIME, POC: NORMAL

## 2024-12-16 NOTE — PROGRESS NOTES
Attempted to contact pt, no answer, left detailed vm.     Electronically signed by TIANA WITT MA on 12/16/24 at 3:56 PM EST

## 2024-12-27 DIAGNOSIS — Z96.652 S/P TOTAL KNEE ARTHROPLASTY, LEFT: Primary | ICD-10-CM

## 2025-01-06 ENCOUNTER — OFFICE VISIT (OUTPATIENT)
Dept: ORTHOPEDIC SURGERY | Age: 70
End: 2025-01-06

## 2025-01-06 ENCOUNTER — NURSE ONLY (OUTPATIENT)
Dept: FAMILY MEDICINE CLINIC | Age: 70
End: 2025-01-06
Payer: MEDICARE

## 2025-01-06 VITALS — WEIGHT: 235 LBS | BODY MASS INDEX: 36.88 KG/M2 | TEMPERATURE: 98.2 F | HEIGHT: 67 IN

## 2025-01-06 DIAGNOSIS — I48.91 ATRIAL FIBRILLATION, UNSPECIFIED TYPE (HCC): ICD-10-CM

## 2025-01-06 DIAGNOSIS — Z79.01 LONG TERM (CURRENT) USE OF ANTICOAGULANTS: Primary | ICD-10-CM

## 2025-01-06 DIAGNOSIS — Z96.652 S/P TOTAL KNEE ARTHROPLASTY, LEFT: Primary | ICD-10-CM

## 2025-01-06 LAB
INTERNATIONAL NORMALIZATION RATIO, POC: 1.9
PROTHROMBIN TIME, POC: NORMAL

## 2025-01-06 PROCEDURE — 93793 ANTICOAG MGMT PT WARFARIN: CPT | Performed by: FAMILY MEDICINE

## 2025-01-06 RX ORDER — CEPHALEXIN 500 MG/1
2000 CAPSULE ORAL DAILY PRN
Qty: 4 CAPSULE | Refills: 0 | Status: SHIPPED | OUTPATIENT
Start: 2025-01-06

## 2025-01-06 ASSESSMENT — PATIENT HEALTH QUESTIONNAIRE - PHQ9
SUM OF ALL RESPONSES TO PHQ QUESTIONS 1-9: 0
1. LITTLE INTEREST OR PLEASURE IN DOING THINGS: NOT AT ALL
SUM OF ALL RESPONSES TO PHQ QUESTIONS 1-9: 0
SUM OF ALL RESPONSES TO PHQ QUESTIONS 1-9: 0
SUM OF ALL RESPONSES TO PHQ9 QUESTIONS 1 & 2: 0
2. FEELING DOWN, DEPRESSED OR HOPELESS: NOT AT ALL
SUM OF ALL RESPONSES TO PHQ QUESTIONS 1-9: 0

## 2025-01-06 NOTE — PROGRESS NOTES
Chief Complaint   Patient presents with    Post-Op Check     Patient is here for her 3.5 month follow up on her left TKA. DOS 24. Patient states that she has a tightness when sleeping. She states she is finished with PT and doing home exercise.       Katie Garcia returns today for follow-up of her left TKA. DOS: 24. She reports this is better than when I saw the patient last. She is finished with PT and is doing well. She has some stiffness at bedtime with minimal pain.    Past Medical History:   Diagnosis Date    Atrial fibrillation (HCC)     Chronic venous insufficiency 2023    Coronary heart disease 2013    Endometrial hyperplasia     Leg pain     Leg swelling 2023    Mixed hyperlipidemia 2022    Pelvic mass     Primary osteoarthritis of right shoulder 2019    Sciatica of right side 2022    Varicose veins of bilateral lower extremities with other complications 2023    Varicose veins of both legs with edema      Past Surgical History:   Procedure Laterality Date     SECTION      DILATION AND CURETTAGE OF UTERUS      ECHO COMPL W DOP COLOR FLOW  2013         HYSTERECTOMY (CERVIX STATUS UNKNOWN)  10/12/2021    TONSILLECTOMY      TOTAL KNEE ARTHROPLASTY Left 2024    LEFT KNEE TOTAL KNEE ARTHROPLASTY -24 SMITH AND NEPHEW performed by Farhat Mcdonnell DO at [facility] OR       Current Outpatient Medications:     warfarin (COUMADIN) 1 MG tablet, Take 1 tablet by mouth daily, Disp: 90 tablet, Rfl: 1    warfarin (COUMADIN) 2.5 MG tablet, TAKE 1 TABLET BY MOUTH DAILY OR AS DIRECTED, Disp: 90 tablet, Rfl: 1    fluticasone (FLONASE) 50 MCG/ACT nasal spray, 1 spray by Each Nostril route as needed for Rhinitis PRN, Disp: , Rfl:     nadolol (CORGARD) 20 MG tablet, Take 1 tablet by mouth daily, Disp: 90 tablet, Rfl: 1    torsemide (DEMADEX) 10 MG tablet, Take 1 tablet by mouth daily (Patient taking differently: Take 1 tablet by mouth daily Takes 3 times per

## 2025-01-23 ENCOUNTER — NURSE ONLY (OUTPATIENT)
Dept: FAMILY MEDICINE CLINIC | Age: 70
End: 2025-01-23

## 2025-01-23 ENCOUNTER — TELEPHONE (OUTPATIENT)
Dept: FAMILY MEDICINE CLINIC | Age: 70
End: 2025-01-23

## 2025-01-23 DIAGNOSIS — I48.91 ATRIAL FIBRILLATION, UNSPECIFIED TYPE (HCC): ICD-10-CM

## 2025-01-23 DIAGNOSIS — Z79.01 LONG TERM (CURRENT) USE OF ANTICOAGULANTS: Primary | ICD-10-CM

## 2025-01-23 LAB
INTERNATIONAL NORMALIZATION RATIO, POC: 1.8
PROTHROMBIN TIME, POC: NORMAL

## 2025-01-23 NOTE — PROGRESS NOTES
1/23/2025: INR: 1.8. Pt taking Coumadin 3.5mg every day, No missed doses and eating salads routinely     Electronically signed by Stephie Palacios LPN on 1/23/25 at 8:42 AM EST

## 2025-01-23 NOTE — TELEPHONE ENCOUNTER
Left message to inform of need to reschedule INR check for 2 weeks. Called both home and cell phone. Temporarily put pt on lab schedule for 2/6/2025 @ 1:30 and instructed to call if she's not able to make it at that time.    Electronically signed by Stephie Palacios LPN on 1/23/25 at 2:29 PM EST

## 2025-01-24 DIAGNOSIS — I48.20 CHRONIC ATRIAL FIBRILLATION (HCC): ICD-10-CM

## 2025-01-24 DIAGNOSIS — I48.91 ATRIAL FIBRILLATION, UNSPECIFIED TYPE (HCC): ICD-10-CM

## 2025-01-24 RX ORDER — WARFARIN SODIUM 2.5 MG/1
TABLET ORAL
Qty: 90 TABLET | Refills: 1 | Status: SHIPPED | OUTPATIENT
Start: 2025-01-24

## 2025-01-24 RX ORDER — WARFARIN SODIUM 1 MG/1
1 TABLET ORAL DAILY
Qty: 90 TABLET | Refills: 1 | Status: SHIPPED | OUTPATIENT
Start: 2025-01-24

## 2025-01-24 NOTE — TELEPHONE ENCOUNTER
Last seen 11/4/2024  Next appt 2/7/2025    Requested Prescriptions     Pending Prescriptions Disp Refills    warfarin (COUMADIN) 2.5 MG tablet 90 tablet 1     Sig: TAKE 1 TABLET BY MOUTH DAILY OR AS DIRECTED    warfarin (COUMADIN) 1 MG tablet 90 tablet 1     Sig: Take 1 tablet by mouth daily    Electronically signed by TIANA WITT MA on 1/24/25 at 8:43 AM EST

## 2025-02-07 ENCOUNTER — NURSE ONLY (OUTPATIENT)
Dept: FAMILY MEDICINE CLINIC | Age: 70
End: 2025-02-07

## 2025-02-07 DIAGNOSIS — Z79.01 LONG TERM (CURRENT) USE OF ANTICOAGULANTS: ICD-10-CM

## 2025-02-07 DIAGNOSIS — I48.91 ATRIAL FIBRILLATION, UNSPECIFIED TYPE (HCC): Primary | ICD-10-CM

## 2025-02-07 LAB
INTERNATIONAL NORMALIZATION RATIO, POC: 2.4
PROTHROMBIN TIME, POC: NORMAL

## 2025-02-07 SDOH — ECONOMIC STABILITY: FOOD INSECURITY: WITHIN THE PAST 12 MONTHS, YOU WORRIED THAT YOUR FOOD WOULD RUN OUT BEFORE YOU GOT MONEY TO BUY MORE.: NEVER TRUE

## 2025-02-07 SDOH — ECONOMIC STABILITY: FOOD INSECURITY: WITHIN THE PAST 12 MONTHS, THE FOOD YOU BOUGHT JUST DIDN'T LAST AND YOU DIDN'T HAVE MONEY TO GET MORE.: NEVER TRUE

## 2025-03-07 ENCOUNTER — NURSE ONLY (OUTPATIENT)
Dept: FAMILY MEDICINE CLINIC | Age: 70
End: 2025-03-07

## 2025-03-07 DIAGNOSIS — I48.91 ATRIAL FIBRILLATION, UNSPECIFIED TYPE (HCC): Primary | ICD-10-CM

## 2025-03-07 DIAGNOSIS — Z79.01 LONG TERM (CURRENT) USE OF ANTICOAGULANTS: ICD-10-CM

## 2025-03-07 LAB
INTERNATIONAL NORMALIZATION RATIO, POC: 2.8
PROTHROMBIN TIME, POC: 0

## 2025-03-28 DIAGNOSIS — Z96.652 S/P TOTAL KNEE ARTHROPLASTY, LEFT: Primary | ICD-10-CM

## 2025-04-07 ENCOUNTER — OFFICE VISIT (OUTPATIENT)
Dept: ORTHOPEDIC SURGERY | Age: 70
End: 2025-04-07
Payer: MEDICARE

## 2025-04-07 VITALS — BODY MASS INDEX: 36.88 KG/M2 | WEIGHT: 235 LBS | HEIGHT: 67 IN

## 2025-04-07 DIAGNOSIS — Z96.652 S/P TOTAL KNEE ARTHROPLASTY, LEFT: Primary | ICD-10-CM

## 2025-04-07 PROCEDURE — 1123F ACP DISCUSS/DSCN MKR DOCD: CPT | Performed by: ORTHOPAEDIC SURGERY

## 2025-04-07 PROCEDURE — G8417 CALC BMI ABV UP PARAM F/U: HCPCS | Performed by: ORTHOPAEDIC SURGERY

## 2025-04-07 PROCEDURE — 99213 OFFICE O/P EST LOW 20 MIN: CPT | Performed by: ORTHOPAEDIC SURGERY

## 2025-04-07 PROCEDURE — 1036F TOBACCO NON-USER: CPT | Performed by: ORTHOPAEDIC SURGERY

## 2025-04-07 PROCEDURE — 3017F COLORECTAL CA SCREEN DOC REV: CPT | Performed by: ORTHOPAEDIC SURGERY

## 2025-04-07 PROCEDURE — 1125F AMNT PAIN NOTED PAIN PRSNT: CPT | Performed by: ORTHOPAEDIC SURGERY

## 2025-04-07 PROCEDURE — 1159F MED LIST DOCD IN RCRD: CPT | Performed by: ORTHOPAEDIC SURGERY

## 2025-04-07 PROCEDURE — G8427 DOCREV CUR MEDS BY ELIG CLIN: HCPCS | Performed by: ORTHOPAEDIC SURGERY

## 2025-04-07 NOTE — PROGRESS NOTES
Patellar J sign  Negative    Xrays: left TKA well aligned with no signs of aseptic loosening    Radiographic findings reviewed with patient    Impression:   Encounter Diagnosis   Name Primary?    S/P total knee arthroplasty, left Yes         Plan:  Follow up 6 months with xray  HEP  WBAT

## 2025-04-14 ENCOUNTER — CLINICAL SUPPORT (OUTPATIENT)
Dept: FAMILY MEDICINE CLINIC | Age: 70
End: 2025-04-14
Payer: MEDICARE

## 2025-04-14 DIAGNOSIS — Z79.01 LONG TERM (CURRENT) USE OF ANTICOAGULANTS: ICD-10-CM

## 2025-04-14 DIAGNOSIS — I48.91 ATRIAL FIBRILLATION, UNSPECIFIED TYPE (HCC): Primary | ICD-10-CM

## 2025-04-14 LAB
INTERNATIONAL NORMALIZATION RATIO, POC: 3
PROTHROMBIN TIME, POC: NORMAL

## 2025-04-14 PROCEDURE — 93793 ANTICOAG MGMT PT WARFARIN: CPT | Performed by: FAMILY MEDICINE

## 2025-04-14 PROCEDURE — 85610 PROTHROMBIN TIME: CPT | Performed by: FAMILY MEDICINE

## 2025-04-14 PROCEDURE — 36415 COLL VENOUS BLD VENIPUNCTURE: CPT | Performed by: FAMILY MEDICINE

## 2025-04-24 DIAGNOSIS — M79.645 PAIN OF LEFT THUMB: Primary | ICD-10-CM

## 2025-04-28 ENCOUNTER — OFFICE VISIT (OUTPATIENT)
Dept: ORTHOPEDIC SURGERY | Age: 70
End: 2025-04-28
Payer: MEDICARE

## 2025-04-28 VITALS — HEIGHT: 67 IN | BODY MASS INDEX: 36.88 KG/M2 | WEIGHT: 235 LBS

## 2025-04-28 DIAGNOSIS — M18.12 ARTHRITIS OF CARPOMETACARPAL (CMC) JOINT OF LEFT THUMB: Primary | ICD-10-CM

## 2025-04-28 PROCEDURE — 1036F TOBACCO NON-USER: CPT | Performed by: ORTHOPAEDIC SURGERY

## 2025-04-28 PROCEDURE — 99213 OFFICE O/P EST LOW 20 MIN: CPT | Performed by: ORTHOPAEDIC SURGERY

## 2025-04-28 PROCEDURE — G8427 DOCREV CUR MEDS BY ELIG CLIN: HCPCS | Performed by: ORTHOPAEDIC SURGERY

## 2025-04-28 PROCEDURE — 1125F AMNT PAIN NOTED PAIN PRSNT: CPT | Performed by: ORTHOPAEDIC SURGERY

## 2025-04-28 PROCEDURE — 1159F MED LIST DOCD IN RCRD: CPT | Performed by: ORTHOPAEDIC SURGERY

## 2025-04-28 PROCEDURE — 3017F COLORECTAL CA SCREEN DOC REV: CPT | Performed by: ORTHOPAEDIC SURGERY

## 2025-04-28 PROCEDURE — G8417 CALC BMI ABV UP PARAM F/U: HCPCS | Performed by: ORTHOPAEDIC SURGERY

## 2025-04-28 PROCEDURE — 1123F ACP DISCUSS/DSCN MKR DOCD: CPT | Performed by: ORTHOPAEDIC SURGERY

## 2025-04-28 PROCEDURE — 20600 DRAIN/INJ JOINT/BURSA W/O US: CPT | Performed by: ORTHOPAEDIC SURGERY

## 2025-04-28 NOTE — PROGRESS NOTES
Chief Complaint   Patient presents with    Hand Pain     Left Hand, Thumb x 1 year, no known recent injury, no previous left hand surgery.  States of pain at the base of the thumb.         Katie Garcia is a 69 y.o. year old  female who presents for evaluation of left hand pain.  Katie reports this started about 1 year ago.  Katie does not remember a specific injury that started the pain.  The injury was none. Katie's pain is located mainly in the thumb. The pain is worse with movement, lifting and better with rest.  The patient has tried OTC analgesics.  The treatment has not been effective.  The patient is Right hand dominant.     Past Medical History:   Diagnosis Date    Atrial fibrillation (HCC)     Chronic venous insufficiency 2023    Coronary heart disease 2013    Endometrial hyperplasia     Leg pain     Leg swelling 2023    Mixed hyperlipidemia 2022    Pelvic mass     Primary osteoarthritis of right shoulder 2019    Sciatica of right side 2022    Varicose veins of bilateral lower extremities with other complications 2023    Varicose veins of both legs with edema      Past Surgical History:   Procedure Laterality Date     SECTION      DILATION AND CURETTAGE OF UTERUS      ECHO COMPL W DOP COLOR FLOW  2013         HYSTERECTOMY (CERVIX STATUS UNKNOWN)  10/12/2021    TONSILLECTOMY      TOTAL KNEE ARTHROPLASTY Left 2024    LEFT KNEE TOTAL KNEE ARTHROPLASTY -24 SMITH AND NEPHEW performed by Farhat Mcdonnell DO at [facility] OR       Current Outpatient Medications:     warfarin (COUMADIN) 2.5 MG tablet, TAKE 1 TABLET BY MOUTH DAILY OR AS DIRECTED, Disp: 90 tablet, Rfl: 1    warfarin (COUMADIN) 1 MG tablet, Take 1 tablet by mouth daily (Patient taking differently: Take 1 tablet by mouth daily As directed), Disp: 90 tablet, Rfl: 1    cephALEXin (KEFLEX) 500 MG capsule, Take 4 capsules by mouth daily as needed (take 2 g, 1 hour prior to dental

## 2025-05-16 ENCOUNTER — CLINICAL SUPPORT (OUTPATIENT)
Dept: FAMILY MEDICINE CLINIC | Age: 70
End: 2025-05-16
Payer: MEDICARE

## 2025-05-16 DIAGNOSIS — I48.20 CHRONIC ATRIAL FIBRILLATION (HCC): ICD-10-CM

## 2025-05-16 DIAGNOSIS — Z79.01 LONG TERM (CURRENT) USE OF ANTICOAGULANTS: ICD-10-CM

## 2025-05-16 DIAGNOSIS — I48.91 ATRIAL FIBRILLATION, UNSPECIFIED TYPE (HCC): Primary | ICD-10-CM

## 2025-05-16 LAB
INTERNATIONAL NORMALIZATION RATIO, POC: 2.2
PROTHROMBIN TIME, POC: NORMAL

## 2025-05-16 PROCEDURE — 93793 ANTICOAG MGMT PT WARFARIN: CPT | Performed by: FAMILY MEDICINE

## 2025-05-16 PROCEDURE — 85610 PROTHROMBIN TIME: CPT | Performed by: FAMILY MEDICINE

## 2025-05-16 PROCEDURE — 36415 COLL VENOUS BLD VENIPUNCTURE: CPT | Performed by: FAMILY MEDICINE

## 2025-05-23 ENCOUNTER — TELEPHONE (OUTPATIENT)
Dept: FAMILY MEDICINE CLINIC | Age: 70
End: 2025-05-23

## 2025-05-23 DIAGNOSIS — M25.552 PAIN IN LEFT HIP: Primary | ICD-10-CM

## 2025-05-23 NOTE — TELEPHONE ENCOUNTER
Patient fell a week ago and now has pain in left hip and left upper thigh. She would like x-rays ordered if possible

## 2025-05-25 ENCOUNTER — HOSPITAL ENCOUNTER (OUTPATIENT)
Dept: GENERAL RADIOLOGY | Age: 70
Discharge: HOME | End: 2025-05-27
Payer: MEDICARE

## 2025-05-25 ENCOUNTER — HOSPITAL ENCOUNTER (OUTPATIENT)
Age: 70
Discharge: HOME | End: 2025-05-27
Payer: MEDICARE

## 2025-05-25 DIAGNOSIS — M25.552 PAIN IN LEFT HIP: ICD-10-CM

## 2025-05-25 PROCEDURE — 73502 X-RAY EXAM HIP UNI 2-3 VIEWS: CPT

## 2025-05-25 PROCEDURE — 73552 X-RAY EXAM OF FEMUR 2/>: CPT

## 2025-05-28 ENCOUNTER — RESULTS FOLLOW-UP (OUTPATIENT)
Dept: FAMILY MEDICINE CLINIC | Age: 70
End: 2025-05-28

## 2025-05-28 NOTE — RESULT ENCOUNTER NOTE
Call pt and let her know she has mild arthritis left hip with osteopenia    set up ortho and tell pt to take calcium with vit. D

## 2025-05-30 ENCOUNTER — OFFICE VISIT (OUTPATIENT)
Dept: FAMILY MEDICINE CLINIC | Age: 70
End: 2025-05-30
Payer: MEDICARE

## 2025-05-30 VITALS
HEIGHT: 67 IN | BODY MASS INDEX: 39.71 KG/M2 | OXYGEN SATURATION: 95 % | SYSTOLIC BLOOD PRESSURE: 120 MMHG | TEMPERATURE: 97.4 F | WEIGHT: 253 LBS | DIASTOLIC BLOOD PRESSURE: 84 MMHG | RESPIRATION RATE: 18 BRPM | HEART RATE: 85 BPM

## 2025-05-30 DIAGNOSIS — I48.91 ATRIAL FIBRILLATION, UNSPECIFIED TYPE (HCC): ICD-10-CM

## 2025-05-30 DIAGNOSIS — I82.462 ACUTE DEEP VEIN THROMBOSIS (DVT) OF CALF MUSCLE VEIN OF LEFT LOWER EXTREMITY (HCC): ICD-10-CM

## 2025-05-30 DIAGNOSIS — M79.89 LEG SWELLING: ICD-10-CM

## 2025-05-30 DIAGNOSIS — I83.90 VARICOSE VEINS OF CALF: ICD-10-CM
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
## 2025-05-30 PROCEDURE — 1123F ACP DISCUSS/DSCN MKR DOCD: CPT | Performed by: FAMILY MEDICINE

## 2025-05-30 PROCEDURE — 1036F TOBACCO NON-USER: CPT | Performed by: FAMILY MEDICINE

## 2025-05-30 PROCEDURE — G8427 DOCREV CUR MEDS BY ELIG CLIN: HCPCS | Performed by: FAMILY MEDICINE

## 2025-05-30 PROCEDURE — 3079F DIAST BP 80-89 MM HG: CPT | Performed by: FAMILY MEDICINE

## 2025-05-30 PROCEDURE — 3017F COLORECTAL CA SCREEN DOC REV: CPT | Performed by: FAMILY MEDICINE

## 2025-05-30 PROCEDURE — G8417 CALC BMI ABV UP PARAM F/U: HCPCS | Performed by: FAMILY MEDICINE

## 2025-05-30 PROCEDURE — 1160F RVW MEDS BY RX/DR IN RCRD: CPT | Performed by: FAMILY MEDICINE

## 2025-05-30 PROCEDURE — 1159F MED LIST DOCD IN RCRD: CPT | Performed by: FAMILY MEDICINE

## 2025-05-30 PROCEDURE — 99213 OFFICE O/P EST LOW 20 MIN: CPT | Performed by: FAMILY MEDICINE

## 2025-05-30 PROCEDURE — 3074F SYST BP LT 130 MM HG: CPT | Performed by: FAMILY MEDICINE

## 2025-06-02 PROBLEM — I83.90 VARICOSE VEINS OF CALF: Status: ACTIVE | Noted: 2025-06-02
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
## 2025-06-02 ASSESSMENT — ENCOUNTER SYMPTOMS
CONSTIPATION: 0
EYE DISCHARGE: 0
BACK PAIN: 1
COUGH: 0
EYE REDNESS: 0
EYE ITCHING: 0
ALLERGIC/IMMUNOLOGIC NEGATIVE: 1
PHOTOPHOBIA: 0
COLOR CHANGE: 0
APNEA: 0
SINUS PRESSURE: 0
VOMITING: 0
DIARRHEA: 0
BLOOD IN STOOL: 0
NAUSEA: 0
SORE THROAT: 0
SINUS PAIN: 0
EYE PAIN: 0
VOICE CHANGE: 0
FACIAL SWELLING: 0
TROUBLE SWALLOWING: 0
WHEEZING: 0
EYES NEGATIVE: 1
ABDOMINAL PAIN: 0
GASTROINTESTINAL NEGATIVE: 1
CHEST TIGHTNESS: 0
ANAL BLEEDING: 0
CHOKING: 0
RHINORRHEA: 0
SHORTNESS OF BREATH: 0
RECTAL PAIN: 0
ABDOMINAL DISTENTION: 0
STRIDOR: 0

## 2025-06-02 NOTE — PROGRESS NOTES
SUBJECTIVE  Katie Garcia is a 69 y.o. adult.    HPI/Chief C/O:  Chief Complaint   Patient presents with    Leg Swelling     Pt c/o left calf swelling off & on (actually on the outside of the left lower leg) x     Swelling goes down when laying to sleep and swells when she is up on her feet during the day.    Pt states she had fallen 5/14/25 on the left hip and states that she had a bruise on the left hip and contusion. Had talked to Dr Mcdonnell about the left knee and was told as long as she could walk without difficulty.     No Known Allergies    This 69 year old pt presents with a fall on 5/14/2025. Pt c/o hematoma left hip. Pt c/o left hip and left femur pain. Xrays left hip and left femur shows mild OA. Pt has varicose veins maggie.  Pt has atrial fib. And is on coumadin. Pt denies chest pain and denies shortness of breath.      ROS:  Review of Systems   Constitutional:  Positive for activity change and fatigue. Negative for appetite change, chills, diaphoresis, fever and unexpected weight change.   HENT:  Negative for congestion, dental problem, drooling, ear discharge, ear pain, facial swelling, hearing loss, mouth sores, nosebleeds, postnasal drip, rhinorrhea, sinus pressure, sinus pain, sneezing, sore throat, tinnitus, trouble swallowing and voice change.    Eyes: Negative.  Negative for photophobia, pain, discharge, redness, itching and visual disturbance.   Respiratory:  Negative for apnea, cough, choking, chest tightness, shortness of breath, wheezing and stridor.    Cardiovascular:  Positive for leg swelling. Negative for chest pain and palpitations.   Gastrointestinal: Negative.  Negative for abdominal distention, abdominal pain, anal bleeding, blood in stool, constipation, diarrhea, nausea, rectal pain and vomiting.   Endocrine: Negative.  Negative for cold intolerance, heat intolerance, polydipsia, polyphagia and polyuria.   Genitourinary: Negative.  Negative for decreased urine volume, difficulty

## 2025-06-03 ENCOUNTER — TELEPHONE (OUTPATIENT)
Dept: VASCULAR SURGERY | Age: 70
End: 2025-06-03

## 2025-06-03 NOTE — TELEPHONE ENCOUNTER
Received referral from Dr. MICHEAL Mcclain. Spoke with patient, she would not like to see Dr. Peguero who she has seen in the past.  Scheduled appointment with Dr. Aldridge on 7-7-25 at 9:30 am.

## 2025-06-16 ENCOUNTER — CLINICAL SUPPORT (OUTPATIENT)
Dept: FAMILY MEDICINE CLINIC | Age: 70
End: 2025-06-16
Payer: MEDICARE

## 2025-06-16 DIAGNOSIS — Z79.01 LONG TERM (CURRENT) USE OF ANTICOAGULANTS: ICD-10-CM

## 2025-06-16 DIAGNOSIS — I48.20 CHRONIC ATRIAL FIBRILLATION (HCC): ICD-10-CM

## 2025-06-16 DIAGNOSIS — I82.462 ACUTE DEEP VEIN THROMBOSIS (DVT) OF CALF MUSCLE VEIN OF LEFT LOWER EXTREMITY (HCC): ICD-10-CM

## 2025-06-16 DIAGNOSIS — I48.91 ATRIAL FIBRILLATION, UNSPECIFIED TYPE (HCC): Primary | ICD-10-CM

## 2025-06-16 LAB
INTERNATIONAL NORMALIZATION RATIO, POC: 1.9
PROTHROMBIN TIME, POC: NORMAL

## 2025-06-16 PROCEDURE — 93793 ANTICOAG MGMT PT WARFARIN: CPT | Performed by: FAMILY MEDICINE

## 2025-06-16 PROCEDURE — 36415 COLL VENOUS BLD VENIPUNCTURE: CPT | Performed by: FAMILY MEDICINE

## 2025-06-16 PROCEDURE — 85610 PROTHROMBIN TIME: CPT | Performed by: FAMILY MEDICINE

## 2025-07-02 ENCOUNTER — OFFICE VISIT (OUTPATIENT)
Dept: FAMILY MEDICINE CLINIC | Age: 70
End: 2025-07-02

## 2025-07-02 VITALS
WEIGHT: 249 LBS | OXYGEN SATURATION: 100 % | TEMPERATURE: 97.1 F | DIASTOLIC BLOOD PRESSURE: 82 MMHG | BODY MASS INDEX: 39.08 KG/M2 | HEIGHT: 67 IN | HEART RATE: 83 BPM | SYSTOLIC BLOOD PRESSURE: 118 MMHG | RESPIRATION RATE: 16 BRPM

## 2025-07-02 DIAGNOSIS — R13.10 DYSPHAGIA, UNSPECIFIED TYPE: ICD-10-CM

## 2025-07-02 DIAGNOSIS — R22.1 LUMP IN THROAT: Primary | ICD-10-CM
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
## 2025-07-07 ENCOUNTER — OFFICE VISIT (OUTPATIENT)
Dept: VASCULAR SURGERY | Age: 70
End: 2025-07-07
Payer: MEDICARE

## 2025-07-07 VITALS — DIASTOLIC BLOOD PRESSURE: 71 MMHG | SYSTOLIC BLOOD PRESSURE: 125 MMHG

## 2025-07-07 DIAGNOSIS — I83.893 VARICOSE VEINS OF BILATERAL LOWER EXTREMITIES WITH OTHER COMPLICATIONS: Primary | ICD-10-CM

## 2025-07-07 PROBLEM — R13.10 DYSPHAGIA: Status: ACTIVE | Noted: 2025-07-07

## 2025-07-07 PROBLEM — R22.1 LUMP IN THROAT: Status: ACTIVE | Noted: 2025-07-07

## 2025-07-07 PROCEDURE — 1123F ACP DISCUSS/DSCN MKR DOCD: CPT | Performed by: SURGERY

## 2025-07-07 PROCEDURE — 1036F TOBACCO NON-USER: CPT | Performed by: SURGERY

## 2025-07-07 PROCEDURE — 99204 OFFICE O/P NEW MOD 45 MIN: CPT | Performed by: SURGERY

## 2025-07-07 PROCEDURE — G8427 DOCREV CUR MEDS BY ELIG CLIN: HCPCS | Performed by: SURGERY

## 2025-07-07 PROCEDURE — G8417 CALC BMI ABV UP PARAM F/U: HCPCS | Performed by: SURGERY

## 2025-07-07 PROCEDURE — 3017F COLORECTAL CA SCREEN DOC REV: CPT | Performed by: SURGERY

## 2025-07-07 PROCEDURE — 1159F MED LIST DOCD IN RCRD: CPT | Performed by: SURGERY

## 2025-07-07 PROCEDURE — 3074F SYST BP LT 130 MM HG: CPT | Performed by: SURGERY

## 2025-07-07 PROCEDURE — 3078F DIAST BP <80 MM HG: CPT | Performed by: SURGERY

## 2025-07-07 ASSESSMENT — ENCOUNTER SYMPTOMS
GASTROINTESTINAL NEGATIVE: 1
BACK PAIN: 1
EYE PAIN: 0
COUGH: 0
SHORTNESS OF BREATH: 0
EYES NEGATIVE: 1
RECTAL PAIN: 0
APNEA: 0
CONSTIPATION: 0
DIARRHEA: 0
PHOTOPHOBIA: 0
CHEST TIGHTNESS: 0
SINUS PAIN: 0
COLOR CHANGE: 0
ABDOMINAL PAIN: 0
TROUBLE SWALLOWING: 1
EYE REDNESS: 0
EYE DISCHARGE: 0
VOMITING: 0
VOICE CHANGE: 0
RHINORRHEA: 0
ABDOMINAL DISTENTION: 0
STRIDOR: 0
FACIAL SWELLING: 0
SORE THROAT: 0
SINUS PRESSURE: 0
ALLERGIC/IMMUNOLOGIC NEGATIVE: 1
WHEEZING: 0
ANAL BLEEDING: 0
NAUSEA: 0
EYE ITCHING: 0
BLOOD IN STOOL: 0
CHOKING: 0

## 2025-07-07 NOTE — PROGRESS NOTES
SUBJECTIVE  Katie Garcia is a 69 y.o. adult.    HPI/Chief C/O:  Chief Complaint   Patient presents with    Other     Lump in throat, tried to make an gaby with ENT was told she needed referral. Worse with solid foods. Symptoms started a few weeks ago        No Known Allergies    This 69 year old presents with lump in throat, and dysphagia with solid foods for a few weeks. Pt denies choking. Pt pt denies shortness of breath.     ROS:  Review of Systems   Constitutional:  Positive for activity change and fatigue. Negative for appetite change, chills, diaphoresis, fever and unexpected weight change.   HENT:  Positive for trouble swallowing. Negative for congestion, dental problem, drooling, ear discharge, ear pain, facial swelling, hearing loss, mouth sores, nosebleeds, postnasal drip, rhinorrhea, sinus pressure, sinus pain, sneezing, sore throat, tinnitus and voice change.    Eyes: Negative.  Negative for photophobia, pain, discharge, redness, itching and visual disturbance.   Respiratory:  Negative for apnea, cough, choking, chest tightness, shortness of breath, wheezing and stridor.    Cardiovascular:  Positive for leg swelling. Negative for chest pain and palpitations.   Gastrointestinal: Negative.  Negative for abdominal distention, abdominal pain, anal bleeding, blood in stool, constipation, diarrhea, nausea, rectal pain and vomiting.   Endocrine: Negative.  Negative for cold intolerance, heat intolerance, polydipsia, polyphagia and polyuria.   Genitourinary: Negative.  Negative for decreased urine volume, difficulty urinating, dysuria, enuresis, flank pain, frequency, genital sores, hematuria, menstrual problem, pelvic pain and urgency.   Musculoskeletal:  Positive for arthralgias (pain to her left calf), back pain and myalgias. Negative for gait problem, joint swelling, neck pain and neck stiffness.   Skin: Negative.  Negative for color change, pallor, rash and wound.   Allergic/Immunologic: Negative.

## 2025-07-07 NOTE — PROGRESS NOTES
ago - which did not show reflux, if no significant reflux than may just need stab phlebectomy  Call if patient develops worsening of swelling or wounds  All ?s answered    Kai Aldridge MD

## 2025-07-08 ENCOUNTER — TELEPHONE (OUTPATIENT)
Dept: VASCULAR SURGERY | Age: 70
End: 2025-07-08

## 2025-07-24 ENCOUNTER — CLINICAL SUPPORT (OUTPATIENT)
Dept: FAMILY MEDICINE CLINIC | Age: 70
End: 2025-07-24
Payer: MEDICARE

## 2025-07-24 DIAGNOSIS — I48.91 ATRIAL FIBRILLATION, UNSPECIFIED TYPE (HCC): Primary | ICD-10-CM

## 2025-07-24 DIAGNOSIS — I48.91 ATRIAL FIBRILLATION, UNSPECIFIED TYPE (HCC): ICD-10-CM

## 2025-07-24 DIAGNOSIS — I82.462 ACUTE DEEP VEIN THROMBOSIS (DVT) OF CALF MUSCLE VEIN OF LEFT LOWER EXTREMITY (HCC): ICD-10-CM

## 2025-07-24 DIAGNOSIS — I48.20 CHRONIC ATRIAL FIBRILLATION (HCC): ICD-10-CM

## 2025-07-24 DIAGNOSIS — Z79.01 LONG TERM (CURRENT) USE OF ANTICOAGULANTS: ICD-10-CM

## 2025-07-24 DIAGNOSIS — I10 ESSENTIAL HYPERTENSION: ICD-10-CM

## 2025-07-24 LAB
INTERNATIONAL NORMALIZATION RATIO, POC: 3.1
PROTHROMBIN TIME, POC: NORMAL

## 2025-07-24 PROCEDURE — 93793 ANTICOAG MGMT PT WARFARIN: CPT | Performed by: FAMILY MEDICINE

## 2025-07-24 PROCEDURE — 85610 PROTHROMBIN TIME: CPT | Performed by: FAMILY MEDICINE

## 2025-07-24 PROCEDURE — 36415 COLL VENOUS BLD VENIPUNCTURE: CPT | Performed by: FAMILY MEDICINE

## 2025-07-24 RX ORDER — WARFARIN SODIUM 1 MG/1
1 TABLET ORAL DAILY
Qty: 90 TABLET | Refills: 1 | Status: SHIPPED | OUTPATIENT
Start: 2025-07-24

## 2025-07-24 RX ORDER — WARFARIN SODIUM 2.5 MG/1
TABLET ORAL
Qty: 90 TABLET | Refills: 1 | Status: SHIPPED | OUTPATIENT
Start: 2025-07-24

## 2025-07-24 RX ORDER — TORSEMIDE 10 MG/1
10 TABLET ORAL DAILY
Qty: 90 TABLET | Refills: 1 | Status: SHIPPED | OUTPATIENT
Start: 2025-07-24

## 2025-07-24 NOTE — TELEPHONE ENCOUNTER
Last Appointment:  7/2/2025  Future Appointments   Date Time Provider Department Center   8/21/2025  1:30 PM SCHEDULE, [facility] ECC DEP   8/25/2025  2:00 PM Jacquie Mccabe MD [place] [facility]   8/29/2025 10:00 AM [facility] CARDIOVASCULAR RM 2 [facility]   10/7/2025 10:30 AM [facility]   11/7/2025 10:00 AM Bing Mcclain DO [facility] ECC DEP    Electronically signed by Stephie Palacios LPN on 7/24/25 at 9:02 AM EDT

## 2025-07-24 NOTE — PROGRESS NOTES
KATHLEEN with instructions on Coumadin being changed to 3 mg daily and to call the office back to schedule a repeat INR in 7 days

## 2025-07-25 NOTE — PROGRESS NOTES
LM to patient to call back to schedule and verify that she received the message about here medication change

## 2025-08-07 ENCOUNTER — TELEPHONE (OUTPATIENT)
Dept: FAMILY MEDICINE CLINIC | Age: 70
End: 2025-08-07

## 2025-08-07 ENCOUNTER — CLINICAL SUPPORT (OUTPATIENT)
Dept: FAMILY MEDICINE CLINIC | Age: 70
End: 2025-08-07
Payer: MEDICARE

## 2025-08-07 DIAGNOSIS — I48.91 ATRIAL FIBRILLATION, UNSPECIFIED TYPE (HCC): Primary | ICD-10-CM

## 2025-08-07 DIAGNOSIS — I48.20 CHRONIC ATRIAL FIBRILLATION (HCC): ICD-10-CM

## 2025-08-07 DIAGNOSIS — Z79.01 LONG TERM (CURRENT) USE OF ANTICOAGULANTS: ICD-10-CM

## 2025-08-07 LAB
INTERNATIONAL NORMALIZATION RATIO, POC: 1.7
PROTHROMBIN TIME, POC: NORMAL

## 2025-08-07 PROCEDURE — 85610 PROTHROMBIN TIME: CPT | Performed by: FAMILY MEDICINE

## 2025-08-07 PROCEDURE — 93793 ANTICOAG MGMT PT WARFARIN: CPT | Performed by: FAMILY MEDICINE

## 2025-08-07 PROCEDURE — 36415 COLL VENOUS BLD VENIPUNCTURE: CPT | Performed by: FAMILY MEDICINE

## 2025-08-14 ENCOUNTER — TELEPHONE (OUTPATIENT)
Dept: FAMILY MEDICINE CLINIC | Age: 70
End: 2025-08-14

## 2025-08-14 ENCOUNTER — ANTI-COAG VISIT (OUTPATIENT)
Dept: FAMILY MEDICINE CLINIC | Age: 70
End: 2025-08-14
Payer: MEDICARE

## 2025-08-14 DIAGNOSIS — I48.91 ATRIAL FIBRILLATION, UNSPECIFIED TYPE (HCC): ICD-10-CM

## 2025-08-14 DIAGNOSIS — Z79.01 LONG TERM (CURRENT) USE OF ANTICOAGULANTS: Primary | ICD-10-CM

## 2025-08-14 DIAGNOSIS — I48.20 CHRONIC ATRIAL FIBRILLATION (HCC): ICD-10-CM

## 2025-08-14 LAB
INTERNATIONAL NORMALIZATION RATIO, POC: 1.8
PROTHROMBIN TIME, POC: NORMAL

## 2025-08-14 PROCEDURE — 85610 PROTHROMBIN TIME: CPT | Performed by: FAMILY MEDICINE

## 2025-08-14 PROCEDURE — 93793 ANTICOAG MGMT PT WARFARIN: CPT | Performed by: FAMILY MEDICINE

## 2025-08-14 PROCEDURE — 36415 COLL VENOUS BLD VENIPUNCTURE: CPT | Performed by: FAMILY MEDICINE

## 2025-08-25 ENCOUNTER — OFFICE VISIT (OUTPATIENT)
Dept: SURGERY | Age: 70
End: 2025-08-25
Payer: MEDICARE

## 2025-08-25 VITALS
WEIGHT: 248 LBS | OXYGEN SATURATION: 97 % | BODY MASS INDEX: 38.84 KG/M2 | HEART RATE: 74 BPM | SYSTOLIC BLOOD PRESSURE: 119 MMHG | TEMPERATURE: 98.2 F | RESPIRATION RATE: 16 BRPM | DIASTOLIC BLOOD PRESSURE: 85 MMHG

## 2025-08-25 DIAGNOSIS — R13.13 PHARYNGEAL DYSPHAGIA: Primary | ICD-10-CM

## 2025-08-25 DIAGNOSIS — Z12.11 ENCOUNTER FOR SCREENING COLONOSCOPY: ICD-10-CM

## 2025-08-25 PROCEDURE — 3074F SYST BP LT 130 MM HG: CPT | Performed by: SURGERY

## 2025-08-25 PROCEDURE — 1159F MED LIST DOCD IN RCRD: CPT | Performed by: SURGERY

## 2025-08-25 PROCEDURE — 99203 OFFICE O/P NEW LOW 30 MIN: CPT | Performed by: SURGERY

## 2025-08-25 PROCEDURE — 3079F DIAST BP 80-89 MM HG: CPT | Performed by: SURGERY

## 2025-08-25 PROCEDURE — G8427 DOCREV CUR MEDS BY ELIG CLIN: HCPCS | Performed by: SURGERY

## 2025-08-25 PROCEDURE — 1036F TOBACCO NON-USER: CPT | Performed by: SURGERY

## 2025-08-25 PROCEDURE — 3017F COLORECTAL CA SCREEN DOC REV: CPT | Performed by: SURGERY

## 2025-08-25 PROCEDURE — 1123F ACP DISCUSS/DSCN MKR DOCD: CPT | Performed by: SURGERY

## 2025-08-25 PROCEDURE — G8417 CALC BMI ABV UP PARAM F/U: HCPCS | Performed by: SURGERY

## 2025-08-25 RX ORDER — POLYETHYLENE GLYCOL 3350 17 G/17G
238 POWDER, FOR SOLUTION ORAL DAILY
Qty: 238 G | Refills: 0 | Status: SHIPPED | OUTPATIENT
Start: 2025-08-25 | End: 2025-08-26

## 2025-08-25 RX ORDER — BISACODYL 5 MG
10 TABLET, DELAYED RELEASE (ENTERIC COATED) ORAL 2 TIMES DAILY
Qty: 4 TABLET | Refills: 0 | Status: SHIPPED | OUTPATIENT
Start: 2025-08-25

## 2025-08-25 ASSESSMENT — ENCOUNTER SYMPTOMS
SHORTNESS OF BREATH: 0
ABDOMINAL DISTENTION: 0
ABDOMINAL PAIN: 0
ALLERGIC/IMMUNOLOGIC NEGATIVE: 1
BLOOD IN STOOL: 0
DIARRHEA: 0
CONSTIPATION: 0
VOMITING: 0
TROUBLE SWALLOWING: 1
BACK PAIN: 0
ANAL BLEEDING: 0
COUGH: 0
EYES NEGATIVE: 1
RESPIRATORY NEGATIVE: 1
NAUSEA: 0
GASTROINTESTINAL NEGATIVE: 1

## 2025-08-27 ENCOUNTER — TELEPHONE (OUTPATIENT)
Age: 70
End: 2025-08-27

## 2025-08-28 ENCOUNTER — ANTI-COAG VISIT (OUTPATIENT)
Dept: FAMILY MEDICINE CLINIC | Age: 70
End: 2025-08-28
Payer: MEDICARE

## 2025-08-28 ENCOUNTER — TELEPHONE (OUTPATIENT)
Dept: INTERVENTIONAL RADIOLOGY/VASCULAR | Age: 70
End: 2025-08-28

## 2025-08-28 DIAGNOSIS — Z79.01 LONG TERM (CURRENT) USE OF ANTICOAGULANTS: ICD-10-CM

## 2025-08-28 DIAGNOSIS — I48.91 ATRIAL FIBRILLATION, UNSPECIFIED TYPE (HCC): Primary | ICD-10-CM

## 2025-08-28 LAB
INTERNATIONAL NORMALIZATION RATIO, POC: 1.9
PROTHROMBIN TIME, POC: NORMAL

## 2025-08-28 PROCEDURE — 85610 PROTHROMBIN TIME: CPT | Performed by: FAMILY MEDICINE

## 2025-08-28 PROCEDURE — 36415 COLL VENOUS BLD VENIPUNCTURE: CPT | Performed by: FAMILY MEDICINE

## 2025-08-28 PROCEDURE — 93793 ANTICOAG MGMT PT WARFARIN: CPT | Performed by: FAMILY MEDICINE

## 2025-08-29 ENCOUNTER — HOSPITAL ENCOUNTER (OUTPATIENT)
Dept: INTERVENTIONAL RADIOLOGY/VASCULAR | Age: 70
Discharge: HOME | End: 2025-08-31
Payer: MEDICARE

## 2025-08-29 DIAGNOSIS — I83.893 VARICOSE VEINS OF BILATERAL LOWER EXTREMITIES WITH OTHER COMPLICATIONS: ICD-10-CM

## 2025-08-29 PROCEDURE — 93970 EXTREMITY STUDY: CPT

## 2025-09-02 ENCOUNTER — TELEPHONE (OUTPATIENT)
Age: 70
End: 2025-09-02

## 2025-09-02 PROBLEM — Z12.11 SCREENING FOR COLON CANCER: Status: ACTIVE | Noted: 2025-09-02

## 2025-09-02 RX ORDER — CEPHALEXIN 500 MG/1
2000 CAPSULE ORAL DAILY PRN
Qty: 4 CAPSULE | Refills: 0 | Status: SHIPPED | OUTPATIENT
Start: 2025-09-02

## 2025-09-03 ENCOUNTER — HOSPITAL ENCOUNTER (OUTPATIENT)
Dept: GENERAL RADIOLOGY | Age: 70
Discharge: HOME | End: 2025-09-05
Attending: SURGERY
Payer: MEDICARE

## 2025-09-03 DIAGNOSIS — R13.13 PHARYNGEAL DYSPHAGIA: ICD-10-CM

## 2025-09-03 PROCEDURE — 2500000003 HC RX 250 WO HCPCS: Performed by: PHYSICIAN ASSISTANT

## 2025-09-03 PROCEDURE — 74220 X-RAY XM ESOPHAGUS 1CNTRST: CPT

## 2025-09-03 RX ADMIN — BARIUM SULFATE 176 G: 960 POWDER, FOR SUSPENSION ORAL at 10:52

## 2025-09-03 RX ADMIN — BARIUM SULFATE 140 ML: 980 POWDER, FOR SUSPENSION ORAL at 10:51

## 2025-09-03 RX ADMIN — BARIUM SULFATE 1 TABLET: 700 TABLET ORAL at 10:51

## (undated) DEVICE — WIPES SKIN CLOTH READYPREP 9 X 10.5 IN 2% CHLORHEX GLUCONATE CHG PREOP

## (undated) DEVICE — 4-PORT MANIFOLD: Brand: NEPTUNE 2

## (undated) DEVICE — SOLUTION IV 1000ML 0.9% SOD CHL PH 5 INJ USP VIAFLX PLAS

## (undated) DEVICE — DRESSING HYDROFIBER AQUACEL AG ADVANTAGE 3.5X12 IN

## (undated) DEVICE — SUTURE N ABSRB 5-0 1.7 MM W/NDL TAPE WHT BLU AR7511

## (undated) DEVICE — SUTURE STRATAFIX SYMMETRIC SZ 1 L18IN ABSRB VLT CT1 L36CM SXPP1A404

## (undated) DEVICE — 3 BONE CEMENT MIXER: Brand: MIXEVAC

## (undated) DEVICE — HANDPIECE SET WITH BONE CLEANING TIP: Brand: INTERPULSE

## (undated) DEVICE — 2108 SERIES SAGITTAL BLADE (28.9 X 0.64 X 58.7MM)

## (undated) DEVICE — GARMENT,MEDLINE,DVT,INT,CALF,MED, GEN2: Brand: MEDLINE

## (undated) DEVICE — TOTAL KNEE PACK: Brand: MEDLINE INDUSTRIES, INC.

## (undated) DEVICE — SUTURE SUTTAPE L40IN DIA1.3MM NONABSORBABLE WHT BLU L26.5MM AR7500

## (undated) DEVICE — 450 ML BOTTLE OF 0.05% CHLORHEXIDINE GLUCONATE IN 99.95% STERILE WATER FOR IRRIGATION, USP AND APPLICATOR.: Brand: IRRISEPT ANTIMICROBIAL WOUND LAVAGE

## (undated) DEVICE — STRYKER PERFORMANCE SERIES SAGITTAL BLADE: Brand: STRYKER PERFORMANCE SERIES

## (undated) DEVICE — NEEDLE SPNL L3.5IN PNK HUB S STL REG WALL FIT STYL W/ QNCKE

## (undated) DEVICE — GLOVE ORANGE PI 8   MSG9080

## (undated) DEVICE — GLOVE ORTHO 8   MSG9480

## (undated) DEVICE — BASIC DOUBLE BASIN 2-LF: Brand: MEDLINE INDUSTRIES, INC.